# Patient Record
Sex: FEMALE | Race: WHITE | NOT HISPANIC OR LATINO | Employment: OTHER
[De-identification: names, ages, dates, MRNs, and addresses within clinical notes are randomized per-mention and may not be internally consistent; named-entity substitution may affect disease eponyms.]

---

## 2017-02-07 ENCOUNTER — RECORDS - HEALTHEAST (OUTPATIENT)
Dept: ADMINISTRATIVE | Facility: OTHER | Age: 72
End: 2017-02-07

## 2017-02-09 ENCOUNTER — RECORDS - HEALTHEAST (OUTPATIENT)
Dept: ADMINISTRATIVE | Facility: OTHER | Age: 72
End: 2017-02-09

## 2017-05-12 ENCOUNTER — OFFICE VISIT - HEALTHEAST (OUTPATIENT)
Dept: FAMILY MEDICINE | Facility: CLINIC | Age: 72
End: 2017-05-12

## 2017-05-12 ENCOUNTER — COMMUNICATION - HEALTHEAST (OUTPATIENT)
Dept: TELEHEALTH | Facility: CLINIC | Age: 72
End: 2017-05-12

## 2017-05-12 DIAGNOSIS — M54.50 LOW BACK PAIN: ICD-10-CM

## 2017-05-12 DIAGNOSIS — L98.9 BENIGN SKIN LESION: ICD-10-CM

## 2017-05-12 RX ORDER — IBUPROFEN 200 MG
400 TABLET ORAL EVERY 6 HOURS PRN
Status: SHIPPED | COMMUNITY
Start: 2017-05-12

## 2017-05-12 ASSESSMENT — MIFFLIN-ST. JEOR: SCORE: 973.85

## 2017-05-16 ENCOUNTER — RECORDS - HEALTHEAST (OUTPATIENT)
Dept: ADMINISTRATIVE | Facility: OTHER | Age: 72
End: 2017-05-16

## 2017-11-16 ENCOUNTER — COMMUNICATION - HEALTHEAST (OUTPATIENT)
Dept: FAMILY MEDICINE | Facility: CLINIC | Age: 72
End: 2017-11-16

## 2017-11-16 ENCOUNTER — RECORDS - HEALTHEAST (OUTPATIENT)
Dept: GENERAL RADIOLOGY | Facility: CLINIC | Age: 72
End: 2017-11-16

## 2017-11-16 ENCOUNTER — OFFICE VISIT - HEALTHEAST (OUTPATIENT)
Dept: FAMILY MEDICINE | Facility: CLINIC | Age: 72
End: 2017-11-16

## 2017-11-16 DIAGNOSIS — R05.9 COUGH: ICD-10-CM

## 2017-11-16 DIAGNOSIS — R52 BODY ACHES: ICD-10-CM

## 2017-11-16 DIAGNOSIS — R52 PAIN, UNSPECIFIED: ICD-10-CM

## 2020-08-06 ENCOUNTER — COMMUNICATION - HEALTHEAST (OUTPATIENT)
Dept: INTERNAL MEDICINE | Facility: CLINIC | Age: 75
End: 2020-08-06

## 2020-08-28 ENCOUNTER — OFFICE VISIT - HEALTHEAST (OUTPATIENT)
Dept: INTERNAL MEDICINE | Facility: CLINIC | Age: 75
End: 2020-08-28

## 2020-08-28 DIAGNOSIS — Z00.00 ROUTINE GENERAL MEDICAL EXAMINATION AT A HEALTH CARE FACILITY: ICD-10-CM

## 2020-08-28 DIAGNOSIS — Z12.11 SCREEN FOR COLON CANCER: ICD-10-CM

## 2020-08-28 DIAGNOSIS — Z98.82 H/O BILATERAL BREAST IMPLANTS: ICD-10-CM

## 2020-08-28 DIAGNOSIS — G47.00 INSOMNIA, UNSPECIFIED TYPE: ICD-10-CM

## 2020-08-28 DIAGNOSIS — M81.0 OSTEOPOROSIS WITHOUT CURRENT PATHOLOGICAL FRACTURE, UNSPECIFIED OSTEOPOROSIS TYPE: ICD-10-CM

## 2020-08-28 DIAGNOSIS — H04.203 BILATERAL EPIPHORA: ICD-10-CM

## 2020-08-28 DIAGNOSIS — E83.50 UNSPECIFIED DISORDER OF CALCIUM METABOLISM: ICD-10-CM

## 2020-08-28 DIAGNOSIS — B00.1 RECURRENT COLD SORES: ICD-10-CM

## 2020-08-28 LAB
ALBUMIN SERPL-MCNC: 4.2 G/DL (ref 3.5–5)
ALP SERPL-CCNC: 62 U/L (ref 45–120)
ALT SERPL W P-5'-P-CCNC: 21 U/L (ref 0–45)
ANION GAP SERPL CALCULATED.3IONS-SCNC: 8 MMOL/L (ref 5–18)
AST SERPL W P-5'-P-CCNC: 26 U/L (ref 0–40)
BASOPHILS # BLD AUTO: 0.1 THOU/UL (ref 0–0.2)
BASOPHILS NFR BLD AUTO: 2 % (ref 0–2)
BILIRUB SERPL-MCNC: 0.6 MG/DL (ref 0–1)
BUN SERPL-MCNC: 18 MG/DL (ref 8–28)
CALCIUM SERPL-MCNC: 9.8 MG/DL (ref 8.5–10.5)
CHLORIDE BLD-SCNC: 98 MMOL/L (ref 98–107)
CO2 SERPL-SCNC: 27 MMOL/L (ref 22–31)
CREAT SERPL-MCNC: 0.73 MG/DL (ref 0.6–1.1)
EOSINOPHIL # BLD AUTO: 0.2 THOU/UL (ref 0–0.4)
EOSINOPHIL NFR BLD AUTO: 3 % (ref 0–6)
ERYTHROCYTE [DISTWIDTH] IN BLOOD BY AUTOMATED COUNT: 12.1 % (ref 11–14.5)
GFR SERPL CREATININE-BSD FRML MDRD: >60 ML/MIN/1.73M2
GLUCOSE BLD-MCNC: 79 MG/DL (ref 70–125)
HCT VFR BLD AUTO: 38.3 % (ref 35–47)
HGB BLD-MCNC: 12.7 G/DL (ref 12–16)
IMM GRANULOCYTES # BLD: 0 THOU/UL
IMM GRANULOCYTES NFR BLD: 0 %
LYMPHOCYTES # BLD AUTO: 1.6 THOU/UL (ref 0.8–4.4)
LYMPHOCYTES NFR BLD AUTO: 34 % (ref 20–40)
MCH RBC QN AUTO: 31.4 PG (ref 27–34)
MCHC RBC AUTO-ENTMCNC: 33.2 G/DL (ref 32–36)
MCV RBC AUTO: 95 FL (ref 80–100)
MONOCYTES # BLD AUTO: 0.4 THOU/UL (ref 0–0.9)
MONOCYTES NFR BLD AUTO: 9 % (ref 2–10)
NEUTROPHILS # BLD AUTO: 2.4 THOU/UL (ref 2–7.7)
NEUTROPHILS NFR BLD AUTO: 51 % (ref 50–70)
PLATELET # BLD AUTO: 197 THOU/UL (ref 140–440)
PMV BLD AUTO: 12.4 FL (ref 8.5–12.5)
POTASSIUM BLD-SCNC: 4.8 MMOL/L (ref 3.5–5)
PROT SERPL-MCNC: 7 G/DL (ref 6–8)
RBC # BLD AUTO: 4.04 MILL/UL (ref 3.8–5.4)
SODIUM SERPL-SCNC: 133 MMOL/L (ref 136–145)
TSH SERPL DL<=0.005 MIU/L-ACNC: 2.15 UIU/ML (ref 0.3–5)
WBC: 4.6 THOU/UL (ref 4–11)

## 2020-08-31 ENCOUNTER — COMMUNICATION - HEALTHEAST (OUTPATIENT)
Dept: INTERNAL MEDICINE | Facility: CLINIC | Age: 75
End: 2020-08-31

## 2020-09-28 ENCOUNTER — RECORDS - HEALTHEAST (OUTPATIENT)
Dept: ADMINISTRATIVE | Facility: OTHER | Age: 75
End: 2020-09-28

## 2020-11-06 ENCOUNTER — RECORDS - HEALTHEAST (OUTPATIENT)
Dept: ADMINISTRATIVE | Facility: OTHER | Age: 75
End: 2020-11-06

## 2020-11-06 LAB — COLOGUARD-ABSTRACT: NEGATIVE

## 2020-11-12 ENCOUNTER — AMBULATORY - HEALTHEAST (OUTPATIENT)
Dept: INTERNAL MEDICINE | Facility: CLINIC | Age: 75
End: 2020-11-12

## 2020-11-12 ENCOUNTER — COMMUNICATION - HEALTHEAST (OUTPATIENT)
Dept: ADMINISTRATIVE | Facility: CLINIC | Age: 75
End: 2020-11-12

## 2020-11-12 DIAGNOSIS — R19.5 POSITIVE COLORECTAL CANCER SCREENING USING COLOGUARD TEST: ICD-10-CM

## 2020-11-16 ENCOUNTER — RECORDS - HEALTHEAST (OUTPATIENT)
Dept: HEALTH INFORMATION MANAGEMENT | Facility: CLINIC | Age: 75
End: 2020-11-16

## 2020-11-23 ENCOUNTER — RECORDS - HEALTHEAST (OUTPATIENT)
Dept: ADMINISTRATIVE | Facility: OTHER | Age: 75
End: 2020-11-23

## 2020-12-17 ENCOUNTER — OFFICE VISIT - HEALTHEAST (OUTPATIENT)
Dept: INTERNAL MEDICINE | Facility: CLINIC | Age: 75
End: 2020-12-17

## 2020-12-17 DIAGNOSIS — L57.0 ACTINIC KERATOSIS: ICD-10-CM

## 2020-12-17 DIAGNOSIS — B00.1 RECURRENT COLD SORES: ICD-10-CM

## 2020-12-17 DIAGNOSIS — H04.203 BILATERAL EPIPHORA: ICD-10-CM

## 2020-12-17 DIAGNOSIS — G47.00 INSOMNIA, UNSPECIFIED TYPE: ICD-10-CM

## 2020-12-17 DIAGNOSIS — Z98.82 H/O BILATERAL BREAST IMPLANTS: ICD-10-CM

## 2020-12-17 DIAGNOSIS — R63.6 UNDERWEIGHT: ICD-10-CM

## 2020-12-17 DIAGNOSIS — Z00.00 ROUTINE GENERAL MEDICAL EXAMINATION AT A HEALTH CARE FACILITY: ICD-10-CM

## 2020-12-17 DIAGNOSIS — M81.0 OSTEOPOROSIS WITHOUT CURRENT PATHOLOGICAL FRACTURE, UNSPECIFIED OSTEOPOROSIS TYPE: ICD-10-CM

## 2020-12-17 RX ORDER — SODIUM CHLORIDE 50 MG/G
OINTMENT OPHTHALMIC
Status: SHIPPED | COMMUNITY
Start: 2020-10-27

## 2020-12-17 RX ORDER — DOXYCYCLINE 50 MG/1
50 CAPSULE ORAL DAILY
Qty: 90 CAPSULE | Refills: 3 | Status: SHIPPED | OUTPATIENT
Start: 2020-12-17 | End: 2021-12-23

## 2020-12-17 ASSESSMENT — MIFFLIN-ST. JEOR: SCORE: 980.43

## 2020-12-18 ENCOUNTER — COMMUNICATION - HEALTHEAST (OUTPATIENT)
Dept: INTERNAL MEDICINE | Facility: CLINIC | Age: 75
End: 2020-12-18

## 2021-02-28 ENCOUNTER — COMMUNICATION - HEALTHEAST (OUTPATIENT)
Dept: INTERNAL MEDICINE | Facility: CLINIC | Age: 76
End: 2021-02-28

## 2021-02-28 DIAGNOSIS — B00.1 RECURRENT COLD SORES: ICD-10-CM

## 2021-02-28 DIAGNOSIS — G47.00 INSOMNIA, UNSPECIFIED TYPE: ICD-10-CM

## 2021-03-01 RX ORDER — VALACYCLOVIR HYDROCHLORIDE 500 MG/1
500 TABLET, FILM COATED ORAL 2 TIMES DAILY PRN
Qty: 20 TABLET | Refills: 1 | Status: SHIPPED | OUTPATIENT
Start: 2021-03-01 | End: 2022-03-25

## 2021-03-01 RX ORDER — ZOLPIDEM TARTRATE 10 MG/1
5 TABLET ORAL
Qty: 90 TABLET | Refills: 0 | Status: SHIPPED | OUTPATIENT
Start: 2021-03-01 | End: 2021-09-27

## 2021-05-26 ENCOUNTER — OFFICE VISIT - HEALTHEAST (OUTPATIENT)
Dept: INTERNAL MEDICINE | Facility: CLINIC | Age: 76
End: 2021-05-26

## 2021-05-26 DIAGNOSIS — Z86.0100 PERSONAL HISTORY OF COLONIC POLYPS: ICD-10-CM

## 2021-05-26 DIAGNOSIS — E87.1 HYPONATREMIA: ICD-10-CM

## 2021-05-26 DIAGNOSIS — M81.0 OSTEOPOROSIS WITHOUT CURRENT PATHOLOGICAL FRACTURE, UNSPECIFIED OSTEOPOROSIS TYPE: ICD-10-CM

## 2021-05-26 DIAGNOSIS — R63.6 UNDERWEIGHT: ICD-10-CM

## 2021-05-26 LAB
ANION GAP SERPL CALCULATED.3IONS-SCNC: 11 MMOL/L (ref 5–18)
BUN SERPL-MCNC: 18 MG/DL (ref 8–28)
CALCIUM SERPL-MCNC: 9.7 MG/DL (ref 8.5–10.5)
CHLORIDE BLD-SCNC: 101 MMOL/L (ref 98–107)
CO2 SERPL-SCNC: 27 MMOL/L (ref 22–31)
CREAT SERPL-MCNC: 0.72 MG/DL (ref 0.6–1.1)
GFR SERPL CREATININE-BSD FRML MDRD: >60 ML/MIN/1.73M2
GLUCOSE BLD-MCNC: 84 MG/DL (ref 70–125)
POTASSIUM BLD-SCNC: 4.6 MMOL/L (ref 3.5–5)
SODIUM SERPL-SCNC: 139 MMOL/L (ref 136–145)
TSH SERPL DL<=0.005 MIU/L-ACNC: 1.88 UIU/ML (ref 0.3–5)

## 2021-05-26 RX ORDER — ONDANSETRON 4 MG/1
TABLET, FILM COATED ORAL
Status: SHIPPED | COMMUNITY
Start: 2021-05-06 | End: 2021-12-14

## 2021-05-26 RX ORDER — AMLODIPINE BESYLATE 2.5 MG/1
TABLET ORAL
Status: SHIPPED | COMMUNITY
Start: 2021-05-10 | End: 2022-04-27

## 2021-05-26 ASSESSMENT — MIFFLIN-ST. JEOR: SCORE: 951.74

## 2021-05-27 ENCOUNTER — COMMUNICATION - HEALTHEAST (OUTPATIENT)
Dept: INTERNAL MEDICINE | Facility: CLINIC | Age: 76
End: 2021-05-27

## 2021-05-31 VITALS — HEIGHT: 64 IN | WEIGHT: 108 LBS | BODY MASS INDEX: 18.44 KG/M2

## 2021-05-31 VITALS — BODY MASS INDEX: 18.48 KG/M2 | WEIGHT: 108.5 LBS

## 2021-06-03 ENCOUNTER — RECORDS - HEALTHEAST (OUTPATIENT)
Dept: ADMINISTRATIVE | Facility: OTHER | Age: 76
End: 2021-06-03

## 2021-06-03 ENCOUNTER — RECORDS - HEALTHEAST (OUTPATIENT)
Dept: BONE DENSITY | Facility: CLINIC | Age: 76
End: 2021-06-03

## 2021-06-03 ENCOUNTER — COMMUNICATION - HEALTHEAST (OUTPATIENT)
Dept: INTERNAL MEDICINE | Facility: CLINIC | Age: 76
End: 2021-06-03

## 2021-06-03 DIAGNOSIS — M81.0 AGE-RELATED OSTEOPOROSIS WITHOUT CURRENT PATHOLOGICAL FRACTURE: ICD-10-CM

## 2021-06-03 DIAGNOSIS — M81.0 OSTEOPOROSIS WITHOUT CURRENT PATHOLOGICAL FRACTURE, UNSPECIFIED OSTEOPOROSIS TYPE: ICD-10-CM

## 2021-06-04 VITALS
BODY MASS INDEX: 19.18 KG/M2 | OXYGEN SATURATION: 97 % | HEART RATE: 71 BPM | TEMPERATURE: 98.3 F | WEIGHT: 112.6 LBS | SYSTOLIC BLOOD PRESSURE: 130 MMHG | DIASTOLIC BLOOD PRESSURE: 86 MMHG

## 2021-06-05 VITALS
DIASTOLIC BLOOD PRESSURE: 90 MMHG | HEIGHT: 65 IN | BODY MASS INDEX: 17.94 KG/M2 | SYSTOLIC BLOOD PRESSURE: 158 MMHG | TEMPERATURE: 98 F | HEART RATE: 71 BPM | WEIGHT: 107.7 LBS | OXYGEN SATURATION: 99 %

## 2021-06-10 NOTE — PATIENT INSTRUCTIONS - HE
Establishing primary care for this 75-year-old woman who was getting most of her medical care from [facility] in [place] but is good to be transitioning to the [facility].  She is historically been very healthy.  Issues are osteoporosis for which she has been on alendronate for approximately 4 years, stopping in 2019 (planned to start bisphosphonate drug holiday), let us have her repeat bone density scanning in May 2021.  Bilateral epiphora, takes doxycycline low-dose 50 mg once a day, monitored at [place] [facility], status post bilateral intraocular implants.  Mild alopecia and spider veins.  Insomnia for which he uses every day Ambien, tolerating well.  Recurrent herpes simplex labialis (cold sores) takes Valtrex 5 mg tablet intermittently.  Last colonoscopy done in 2010 revealing only diverticulosis, could consider getting Cologuard stool stool testing every 3 years.  Slightly underweight, body mass index around 18.  Immunizations up-to-date including shingles, and tetanus, but I think she should get a pneumococcal 23 polysaccharide vaccine today, and also needs to get her annual flu vaccine soon as that is available in the next week or 2.    Today we will get some baseline blood tests, nonfasting, will get comprehensive metabolic panel, CBC, thyroid cascade.  Cologuard test ordered.    Going issue issue:    Osteoporosis for which she has been on alendronate for approximately 4 years, stopping in 2019 (planned to start bisphosphonate drug holiday), let us have her repeat bone density scanning in May 2021.  [facility] endocrinology had recommended her to continue alendronate through 2020 to complete 5 years.  However she decided to stop it in 2019.  That means she had 4 years of alendronate.  A drug holiday typically last for 5 years, then alendronate could be restarted.  Last DEXA scan was May 2019 done at [facility] with a femoral neck T score of -2.5, lumbar spine T score -2.2.    I reminded her about  the importance of staying on her calcium and vitamin D supplements.  List plan to recheck DEXA bone density scan in May 2021, which will be a 2-year interval from her last test at [facility].  Her posture is good.  She is no history of fractures.  She stays physically active.  Her gait is stable.    Bilateral epiphora, takes doxycycline low-dose 50 mg once a day, monitored at [place] [facility], status post bilateral intraocular implants.      Mild alopecia and spider veins.     Insomnia for which he uses every day Ambien, tolerating well.  She told me that alcohol consumption is 2 glasses of wine per day.  I told her that a little bit on the high side, since CDC recommendations say that women should limit alcohol to 1 serving per day, which would be 1 bottle beer, 1 glass of wine, or 1 ounce of liquor.    Recurrent herpes simplex labialis (cold sores) takes Valtrex 5 mg tablet intermittently.      Last colonoscopy done in 2010 revealing only diverticulosis, could consider getting Cologuard stool stool testing every 3 years.      Slightly underweight, body mass index around 18.     Immunizations up-to-date including shingles, and tetanus, but I think she should get a pneumococcal 23 polysaccharide vaccine today, and also needs to get her annual flu vaccine soon as that is available in the next week or 2.    Immunization History   Administered Date(s) Administered     Hep A, historic 05/12/2011     Influenza [id number], 01/06/2010     Influenza, inj, historic,unspecified 09/24/2010, 09/21/2011, 10/01/2012, 10/29/2013, 09/21/2014, 09/13/2015, 09/06/2016, 10/01/2016, 09/29/2017     Pneumo Conj 13-V (2010&after) 11/01/2016     Td,adult,historic,unspecified 04/12/2000, 05/12/2011     ZOSTER, LIVE 05/18/2009     ZOSTER, RECOMBINANT, IM 05/13/2019     I like to see her back in 6 months.

## 2021-06-10 NOTE — TELEPHONE ENCOUNTER
I really cannot order if she is not my patient.  She can go through her PCP or she can contact [facility].

## 2021-06-10 NOTE — TELEPHONE ENCOUNTER
Spoke with the patient and relayed message below from Dr. Adams.  She verbalized understanding and had no further questions at this time.  Veronique JHA, JENNIFER/CMT....................12:10 PM

## 2021-06-10 NOTE — PROGRESS NOTES
Office Visit - New Patient   Alma Michele   75 y.o.  female    Date of visit: 8/28/2020  Physician: Andrea Cruz MD     Chief Complaint   Chief Complaint   Patient presents with     Blood Pressure Check        Assessment and Plan  This 75 y.o. old woman    Establishing primary care for this 75-year-old woman who was getting most of her medical care from [facility] in [place] but is good to be transitioning to the [facility].  She is historically been very healthy.  Issues are osteoporosis for which she has been on alendronate for approximately 4 years, stopping in 2019 (planned to start bisphosphonate drug holiday), let us have her repeat bone density scanning in May 2021.  Bilateral epiphora, takes doxycycline low-dose 50 mg once a day, monitored at [place] [facility], status post bilateral intraocular implants.  Mild alopecia and spider veins.  Insomnia for which he uses every day Ambien, tolerating well.  Recurrent herpes simplex labialis (cold sores) takes Valtrex 5 mg tablet intermittently.  Last colonoscopy done in 2010 revealing only diverticulosis, could consider getting Cologuard stool stool testing every 3 years.  Slightly underweight, body mass index around 18.  Immunizations up-to-date including shingles, and tetanus, but I think she should get a pneumococcal 23 polysaccharide vaccine today, and also needs to get her annual flu vaccine soon as that is available in the next week or 2.    Today we will get some baseline blood tests, nonfasting, will get comprehensive metabolic panel, CBC, thyroid cascade.  Cologuard test ordered.    Going issue issue:    Osteoporosis for which she has been on alendronate for approximately 4 years, stopping in 2019 (planned to start bisphosphonate drug holiday), let us have her repeat bone density scanning in May 2021.  [facility] endocrinology had recommended her to continue alendronate through 2020 to complete 5 years.  However she decided to stop it in 2019.   That means she had 4 years of alendronate.  A drug holiday typically last for 5 years, then alendronate could be restarted.  Last DEXA scan was May 2019 done at [facility] with a femoral neck T score of -2.5, lumbar spine T score -2.2.    I reminded her about the importance of staying on her calcium and vitamin D supplements.  List plan to recheck DEXA bone density scan in May 2021, which will be a 2-year interval from her last test at [facility].  Her posture is good.  She is no history of fractures.  She stays physically active.  Her gait is stable.    Bilateral epiphora, takes doxycycline low-dose 50 mg once a day, monitored at [place] [facility], status post bilateral intraocular implants.      Mild alopecia and spider veins.     Insomnia for which he uses every day Ambien, tolerating well.  She told me that alcohol consumption is 2 glasses of wine per day.  I told her that a little bit on the high side, since CDC recommendations say that women should limit alcohol to 1 serving per day, which would be 1 bottle beer, 1 glass of wine, or 1 ounce of liquor.    Recurrent herpes simplex labialis (cold sores) takes Valtrex 5 mg tablet intermittently.      Last colonoscopy done in 2010 revealing only diverticulosis, could consider getting Cologuard stool stool testing every 3 years.      Slightly underweight, body mass index around 18.     Immunizations up-to-date including shingles, and tetanus, but I think she should get a pneumococcal 23 polysaccharide vaccine today, and also needs to get her annual flu vaccine soon as that is available in the next week or 2.    Immunization History   Administered Date(s) Administered     Hep A, historic 05/12/2011     Influenza [id number], 01/06/2010     Influenza, inj, historic,unspecified 09/24/2010, 09/21/2011, 10/01/2012, 10/29/2013, 09/21/2014, 09/13/2015, 09/06/2016, 10/01/2016, 09/29/2017     Pneumo Conj 13-V (2010&after) 11/01/2016     Td,adult,historic,unspecified  04/12/2000, 05/12/2011     ZOSTER, LIVE 05/18/2009     ZOSTER, RECOMBINANT, IM 05/13/2019     I like to see her back in 6 months.    --------------------------------------------------------------------------------------------------------------------------  History of Present Illness  This 75 y.o. old woman  Establishing primary care for this 75-year-old woman who was getting most of her medical care from [facility] in [place] but is good to be transitioning to the [facility].  She is historically been very healthy.  Issues are osteoporosis for which she has been on alendronate for approximately 4 years, stopping in 2019 (planned to start bisphosphonate drug holiday), let us have her repeat bone density scanning in May 2021.  Bilateral epiphora, takes doxycycline low-dose 50 mg once a day, monitored at [place] [facility], status post bilateral intraocular implants.  Mild alopecia and spider veins.  Insomnia for which he uses every day Ambien, tolerating well.  Recurrent herpes simplex labialis (cold sores) takes Valtrex 5 mg tablet intermittently.  Last colonoscopy done in 2010 revealing only diverticulosis, could consider getting Cologuard stool stool testing every 3 years.  Slightly underweight, body mass index around 18.  Immunizations up-to-date including shingles, and tetanus, but I think she should get a pneumococcal 23 polysaccharide vaccine today, and also needs to get her annual flu vaccine soon as that is available in the next week or 2.    [place] 5-22-19  [place] ophtho  Epiphora Bilateral  Mild, chronic condition  Lacrimal drainage system seems to be intact.  Patient is not regularly using artificial tears    Doxycyline 50 mg a day  [facility]   Intraocular Lens Implant Status Post  Both eyes, stable  Plan: Monitor periodically.    evaluation of osteoporosis. The patient diagnosed with osteoporosis in 2005 based upon bone density.    May 2019  Bone mineral density testing  Left Hip [single  scan]:  Femur Neck: BMD = 0.687 g/cm (sq)  T-score = -2.5 Z-score = -0.7  Total Hip: BMD = 0.662 g/cm (sq)  T-score = -2.7 Z-score = -1.1  Right Hip [single scan]:  Femur Neck: BMD = 0.661 g/cm (sq)  T-score = -2.7 Z-score = -0.9  Total Hip: BMD = 0.658 g/cm (sq)  T-score = -2.8 Z-score = -1.2  Lumbar Spine [single scan]:  L1: BMD = 0.867 g/cm (sq), T-score =-2.2, Z-score =-0.5  L2: BMD = 0.949 g/cm (sq), T-score =-2.2, Z-score =-0.4  Total Lumbar Spine: BMD = 0.910 g/cm (sq)  T-score = -2.2 Z-score = -0.5  Trabecular Bone Scores:  L1-L2: TBS = 1.170    Skeletal imaging  Spine x-ray 2 years ago no fractures    Postmenopausal osteoporosis  Doing well. She desires drug holidays to manage questions regarding potential for over treatment. Although hip T-score is low, lacks other risk factors and this is primary prevention thus agree with holiday approach.   Completing 5 years of alendronate therapy (through 2020) then discontinuing for 5 years unless she were to have a low trauma fracture.     Likely to restart bisphosphonate therapy in 2025 and less alternative agent chosen especially if she has a low trauma fracture.    Osteoporosis  Patient has known osteoporosis, and has been on Fosamax for the past 5 years.   Started holiday 2019  .    Alopecia  She noted that there is a linear pattern of hair loss on the right side of her scalp.    Spider Vein  She has bilateral lower extremity spider veins, had previous sclerotherapy    Insomnia  She has been using Ambien at a low dose of 5 mg and night tolerating without side effects.    Porokeratosis Disseminated Superficial Actinic  She the past was treated with topical Tay joint 0.05% cream to be applied to affected areas nightly and she will be following up with Dermatology    Herpes Simplex Labialis  She will have 0-3 flares in a year of her oral herpes. She has Valtrex, 500 mg tablets available orally if she has an outbreak.    [facility]  A. Smoking  history. Sporadic in college, intermittently for three years. She was not definitive, but it sounds like it might have been a pack a week or a pack a month during that time period. Alcohol: She drinks between 1 and 1/2 glasses of wine per day and has reduced overall intake.  B. Mammogram pending. No family history of breast cancer. Repeat in one year.  C. Colonoscopy done 2010 revealing only diverticulosis. No family history of colon cancer. Repeat colon screening which could include Cologuard stool testing in 2020.  D. Body mass index normal at 18.3, unchanged.  E. Exercises: Encouraged to incorporate a walking program into her daily activities.  F. Falls: None in the past year.   G. Glucose: 91, normal   H. Blood pressure 129/76 .   I. Immunizations: Tdap given 2011. Repeat in 2021. Prevnar 13 -she states she get locally in 11/01/2016. Zoster given 2009. Influenza given yearly, shingles vaccination given baseline and booster given May 2019.   J. Advance directives: The patient has one, her  Phong Michele is power of  for healthcare and her daughter, Sara Shoemaker, is alternative. Patient brought a copy of her advance directive, and a copy will be scanned into the computer.  K. Patient has both smoke detectors and carbon monoxide detectors in her home.  L. Lipids; Total cholesterol 197, HDL 84, , triglycerides 44.   M. Skin: No personal history of skin cancer. She uses SPF 30 or above, and she has remote history of tanning bed use, not current.  N. Drives: Seatbelts 100% of the time. No motor vehicle accidents in the last year.   O. Repeat bone density pending. Vitamin D levels in 2015 were 74, on replacement. Known osteoporosis. Patient had restarted Fosamax in 2015 after a drug holiday between 2011 and 2015.  P. Pap smear not recommended over age 65.   Q. Hepatitis C screen: Done in 2014, negative.     Immunization History   Administered Date(s) Administered     Hep A, historic 05/12/2011      Influenza [id number], 01/06/2010     Influenza, inj, historic,unspecified 09/24/2010, 09/21/2011, 10/01/2012, 10/29/2013, 09/21/2014, 09/13/2015, 09/06/2016, 10/01/2016, 09/29/2017     Pneumo Conj 13-V (2010&after) 11/01/2016     Td,adult,historic,unspecified 04/12/2000, 05/12/2011     ZOSTER, LIVE 05/18/2009     ZOSTER, RECOMBINANT, IM 05/13/2019       Wt Readings from Last 3 Encounters:   08/28/20 112 lb 9.6 oz (51.1 kg)   11/16/17 108 lb 8 oz (49.2 kg)   05/12/17 108 lb (49 kg)     BP Readings from Last 3 Encounters:   08/28/20 130/86   11/16/17 120/80   05/12/17 116/86     Review of Systems: A comprehensive review of systems was negative except as noted.  ---------------------------------------------------------------------------------------------------------------------------    Medications and Allergies   Current Outpatient Medications   Medication Sig Dispense Refill     aspirin 81 MG EC tablet Take 81 mg by mouth daily.       calcium carbonate (OS-TIMOTHY) 600 mg calcium (1,500 mg) tablet Take 600 mg by mouth 2 (two) times a day with meals.       cholecalciferol, vitamin D3, (VITAMIN D3) 2,000 unit cap Take 1 capsule by mouth daily.       doxycycline (MONODOX) 50 MG capsule Take 50 mg by mouth daily.       ibuprofen (ADVIL,MOTRIN) 200 MG tablet Take 400 mg by mouth every 6 (six) hours as needed for pain.       valACYclovir (VALTREX) 500 MG tablet Take 500 mg by mouth 2 (two) times a day as needed.       zolpidem (AMBIEN) 10 mg tablet Take 5 mg by mouth at bedtime as needed for sleep.       amoxicillin (AMOXIL) 500 MG capsule Take 500 mg by mouth once. Before procedure       No current facility-administered medications for this visit.      No Known Allergies     Active Ambulatory Problems     Diagnosis Date Noted     Recurrent cold sores 05/12/2017     Osteoporosis 05/12/2017     Actinic keratosis 05/12/2017     H/O bilateral breast implants 05/12/2017     Bilateral epiphora 08/28/2020     Insomnia 08/28/2020      Resolved Ambulatory Problems     Diagnosis Date Noted     No Resolved Ambulatory Problems     No Additional Past Medical History     No past surgical history on file.   No past medical history on file.     Family and Social History   Family History   Problem Relation Age of Onset     Heart failure Father      Colon polyps Sister      No Medical Problems Brother      No Medical Problems Maternal Grandmother      Stroke Maternal Grandfather         Social History     Tobacco Use     Smoking status: Former Smoker     Years: 4.00     Smokeless tobacco: Never Used   Substance Use Topics     Alcohol use: Not on file     Drug use: No        Physical Exam   General Appearance: Very pleasant, normal mental status, appears well, but thin, cognitively seems quite intact, rises easily from seated to standing position, gait steady    /86 (Patient Site: Right Arm, Patient Position: Sitting, Cuff Size: Adult Large)   Pulse 71   Temp 98.3  F (36.8  C) (Oral)   Wt 112 lb 9.6 oz (51.1 kg)   SpO2 97%   BMI 19.18 kg/m      General: Alert, in no distress  Skin: No significant lesion seen.  Eyes/nose/throat: Eyes without scleral icterus, eye movements normal, pupils equal and reactive, oropharynx clear, ears with normal TM's  MSK: Neck with good ROM  Lymphatic: Neck without adenopathy or masses  Endocrine: Thyroid with no nodules to palpation  Pulm: Lungs clear to auscultation bilaterally  Cardiac: Heart with regular rate and rhythm, no murmur or gallop  GI: Abdomen soft, nontender. No palpable enlargement of liver or spleen  MSK: Extremities no tenderness or edema  Neuro: Moves all extremities, without focal weakness  Psych: Alert, normal mental status. Normal affect and speech       Additional Information        Andrea Cruz MD  Internal Medicine

## 2021-06-10 NOTE — PROGRESS NOTES
Assessment and Plan    1. Low back pain  Improving on its own.  Discussed importance of core training - yoga, pilates for instance - for preventing low back pain    2. Benign skin lesion - no red flag with respect to size, border, color, symmetry  Looks like a seborrheic keratosis to me - reassured    Per her request for eyelash growth encouragement:    Medications Ordered   Medications     bimatoprost (LATISSE) 0.03 % ophthalmic solution     Sig: Administer 1 application to both eyes at bedtime. 1 drop  apply evenly along the skin of each upper eyelid at base of eyelashes qhs     Dispense:  3 mL     Refill:  12       Over 30 minutes spent with this patient, over half in counseling - reviewing history , current issues, red flags for back pain, prevention    Patient Instructions       Three cancers to watch for  1) melanoma - DARK, irregular  2) squamous cell cancer - rough reddish or pale - no distinct border  3)basal cell - a little staci with a depression in the middle    The imposter  Seborrheic keratoses - also called stucco keratosis - looks stuck on - tends to be brownish and a little greasy - they can ge really large    Asymetry  Border (irregular)  Color - different mixes of color  Diameter - greater than 5 m  .    Preeti Gilbert MD     -------------------------------------------    Chief Complaint   Patient presents with     Establish Care     Other     spot on left leg since winter     Back Pain     had it before Easter, must have been from lifting grandkids, tight feeling in lower back, sometimes harder to sit and getting up.     Darell     wants Darell Marquez is generally healthy.  Her  - who is a member of [employer] - gets and executive physical at [facility] every year, and years ago Alma had decided to go there for physicals as well.  However her doctor there said she ought to establish with a primary care physician here, in she gets sick or something happens here. I did review  with her that as PCPs, we also do preventive care, but I understand she has been seeing the same physician for years and wants to stick with her.    Ongoing issues:   - osteoporosis - she takes alendronate   - actinic keratoses - she sees a dermatologist and has had these frozen off   - recurrent cold sores - takes Valtrex prn    Regarding preventive care:   - colonoscopy done   - Has a living will at home - will bring a copy   - Has had pneumonia vaccine      Other concerns    She has had a spot on her legs since winter and wonders if she should worry    She also would like an prescription for Latisse for hypotrichosis of eyelashes    Started having back pain around [place] - doing a lot of work getting ready for family visit at [place] at [place] time .  Not sure if she did something.  Her grandchildren came to visit and she does pick them up and she assumes she pulled something. It has gotten a little better the last week - .  Walking has always been fine.  Leaning forward or getting up makes it worse.      She has no tingling or weakness.  She does not  do any regular exercise, but she is active around the house and the yard    Patient Active Problem List   Diagnosis     Recurrent cold sores     Osteoporosis     Actinic keratosis     H/O bilateral breast implants         Current Outpatient Prescriptions:      alendronate (FOSAMAX) 70 MG tablet, Take 70 mg by mouth every 7 days. Take in the morning on an empty stomach with a full glass of water 30 minutes before food, Disp: , Rfl:      amoxicillin (AMOXIL) 500 MG capsule, Take 500 mg by mouth once. Before procedure, Disp: , Rfl:      aspirin 81 MG EC tablet, Take 81 mg by mouth daily., Disp: , Rfl:      calcium carbonate (OS-TIMOTHY) 600 mg calcium (1,500 mg) tablet, Take 600 mg by mouth 2 (two) times a day with meals., Disp: , Rfl:      cholecalciferol, vitamin D3, (VITAMIN D3) 2,000 unit cap, Take 1 capsule by mouth daily., Disp: , Rfl:       ibuprofen (ADVIL,MOTRIN) 200 MG tablet, Take 400 mg by mouth every 6 (six) hours as needed for pain., Disp: , Rfl:      valACYclovir (VALTREX) 500 MG tablet, Take 500 mg by mouth 2 (two) times a day as needed., Disp: , Rfl:      zolpidem (AMBIEN) 10 mg tablet, Take 5 mg by mouth at bedtime as needed for sleep., Disp: , Rfl:      bimatoprost (LATISSE) 0.03 % ophthalmic solution, Administer 1 application to both eyes at bedtime. 1 drop  apply evenly along the skin of each upper eyelid at base of eyelashes qhs, Disp: 3 mL, Rfl: 12          History   Smoking Status     Current Every Day Smoker     Years: 4.00   Smokeless Tobacco     Not on file       History   Alcohol use Not on file       Review of Systems -  Feeling generally well    Vitals:    05/12/17 1344   BP: 116/86   Pulse: 67   SpO2: 98%     Body mass index is 18.39 kg/(m^2).     EXAM:    General appearance - alert, well appearing, and in no distress  Skin - 5mm oval lesion with irregular surface on left shin - most consistent with seborrheic keratosis

## 2021-06-13 NOTE — TELEPHONE ENCOUNTER
Please call her to tell her that the Cologuard test came back positive, and therefore I have ordered for her a diagnostic colonoscopy.    Remind her that the Cologuard positive test does not necessarily mean that there is a cancer present.  It only means that there was some abnormal DNA present, and then she needs to get a colonoscopy to further evaluate.  It is likely that polyps are present.

## 2021-06-13 NOTE — PROGRESS NOTES
Assessment and Plan:     Annual wellness visit, she is generally been feeling well since our previous visit of August 28, 2020.  Unfortunately her  got seriously ill at the beginning of October, initially urosepsis, then he went into the ICU with respiratory failure and cardiac problems, and he continues to be hospitalized at the [facility], had a cardiorespiratory arrest when he was about to be transferred to a TCU, now he has a trach, with intermittent ventilatory support.  This is all been extremely stressful for Ms. Michele, especially because she cannot see him in person, only on video.    We do not need any laboratory testing today, since we did a sent August 28, 2020 which included a satisfactory thyroid cascade, blood cell counts, and comprehensive metabolic panel (only abnormal value with sodium of 133 which I do not think is significant).    I like to see her back in about 6 months, and we can do some monitoring blood test at that time.  6 months from now would also be about the time to run her next DEXA scan.    I also encouraged her to put together her advanced directive and living will documents.  She told me these issues have been brought to the forefront during her 's illness.    Osteoporosis for which she has been on alendronate for approximately 4 years, stopping in 2019 (planned to start bisphosphonate drug holiday), let us have her repeat bone density scanning in May 2021.  [facility] endocrinology had recommended her to continue alendronate through 2020 to complete 5 years.  However she decided to stop it in 2019.  That means she had 4 years of alendronate.  A drug holiday typically last for 5 years, then alendronate could be restarted.  Last DEXA scan was May 2019 done at [facility] with a femoral neck T score of -2.5, lumbar spine T score -2.2.     I reminded her about the importance of staying on her calcium and vitamin D supplements.  List plan to recheck DEXA bone  density scan in May 2021, which will be a 2-year interval from her last test at [facility].  Her posture is good.  She is no history of fractures.  She stays physically active.  Her gait is stable.     Blood pressure is a bit elevated today at 158/90.  I asked her to keep track of her blood pressure with her home machine, and let me know if the systolic number at rest is repeatedly above 145.  I wonder some of the blood pressure elevation has been because of stress with her 's illness.    Bilateral epiphora, takes doxycycline low-dose 50 mg once a day, monitored at [place] [facility], status post bilateral intraocular implants.      Earwax on the left, I suggested to use an over-the-counter wax dissolving eardrop kit, example brand Debrox or Murine.  These are available in the ear care section of the pharmacy.    Mild alopecia and spider veins.     Insomnia for which he uses every day Ambien, but I really want her to cease alcohol consumption since she uses Ambien.  She told me that alcohol consumption is 2 glasses of wine per day.     Recurrent herpes simplex labialis (cold sores) takes Valtrex 5 mg tablet intermittently.      Colon polyp, 5 mm in the transverse seen on colonoscopy November 23, 2020.  Recheck in 5 years would be entirely optional.  She told me that the prep was really unpleasant, to the point that it made her vomit.  I told her that I think she could probably stop doing screening colonoscopy, especially since she also had a negative Cologuard on November 6, 2020    Slightly underweight, body mass index around 18.  She assured me that she is eating healthy food.  Yet she still getting a fair number of calories from the 2 glasses of wine per night.  I asked her to think about dialing back to wine, maybe even consider stopping, and eat more food.    Immunizations up-to-date including seasonal flu vaccine which she received on September 20, 2020.  Has received both pneumococcal vaccines, and  also got both recombinant shingles shots..    Immunization History   Administered Date(s) Administered     Hep A, historic 05/12/2011     Influenza [id number], 01/06/2010     Influenza high dose,seasonal,PF, 65+ yrs 09/28/2020     Influenza, inj, historic,unspecified 09/24/2010, 09/21/2011, 10/01/2012, 10/29/2013, 09/21/2014, 09/13/2015, 09/06/2016, 10/01/2016, 09/29/2017     Pneumo Conj 13-V (2010&after) 11/01/2016     Pneumo Polysac 23-V 08/28/2020     Td,adult,historic,unspecified 04/12/2000, 05/12/2011     ZOSTER, LIVE 05/18/2009     ZOSTER, RECOMBINANT, IM 05/13/2019       The patient's current medical problems were reviewed.    I have had an Advance Directives discussion with the patient.     The following health maintenance schedule was reviewed with the patient and provided in printed form in the after visit summary:   Health Maintenance   Topic Date Due     HEPATITIS C SCREENING  1945     DEXA SCAN  1945     LIPID  04/12/1991     ZOSTER VACCINES (3 of 3) 07/08/2019     TD 18+ HE  05/12/2021     MAMMOGRAM  05/22/2021     MEDICARE ANNUAL WELLNESS VISIT  12/17/2021     FALL RISK ASSESSMENT  12/17/2021     ADVANCE CARE PLANNING  12/17/2025     COLORECTAL CANCER SCREENING  11/23/2030     Pneumococcal Vaccine: 65+ Years  Completed     INFLUENZA VACCINE RULE BASED  Completed     Pneumococcal Vaccine: Pediatrics (0 to 5 Years) and At-Risk Patients (6 to 64 Years)  Aged Out     HEPATITIS B VACCINES  Aged Out        Subjective:   Chief Complaint: Alma Michele is an 75 y.o. female here for an Annual Wellness visit.   HPI:     Annual wellness visit, she is generally been feeling well since our previous visit of August 28, 2020.  Unfortunately her  got seriously ill at the beginning of October, initially urosepsis, then he went into the ICU with respiratory failure and cardiac problems, and he continues to be hospitalized at the [facility], had a cardiorespiratory arrest when he was about to be  transferred to a TCU, now he has a trach, with intermittent ventilatory support.  This is all been extremely stressful for Ms. Michele, especially because she cannot see him in person, only on video.    We do not need any laboratory testing today, since we did a sent August 28, 2020 which included a satisfactory thyroid cascade, blood cell counts, and comprehensive metabolic panel (only abnormal value with sodium of 133 which I do not think is significant).    I like to see her back in about 6 months, and we can do some monitoring blood test at that time.  6 months from now would also be about the time to run her next DEXA scan.    I also encouraged her to put together her advanced directive and living will documents.  She told me these issu      She is historically been very healthy.  Issues are osteoporosis for which she has been on alendronate for approximately 4 years, stopping in 2019 (planned to start bisphosphonate drug holiday), let us have her repeat bone density scanning in May 2021.  Bilateral epiphora, takes doxycycline low-dose 50 mg once a day, monitored at [place] [facility], status post bilateral intraocular implants.  Mild alopecia and spider veins.  Insomnia for which he uses every day Ambien, tolerating well.  Recurrent herpes simplex labialis (cold sores) takes Valtrex 5 mg tablet intermittently.  Last colonoscopy done in 2010 revealing only diverticulosis, could consider getting Cologuard stool stool testing every 3 years.  Slightly underweight, body mass index around 18.      Review of Systems:  Please see above.  The rest of the review of systems are negative for all systems.    Patient Care Team:  Andrea Cruz MD as PCP - General (Internal Medicine)  Andrea Cruz MD as Assigned PCP     Patient Active Problem List   Diagnosis     Recurrent cold sores     Osteoporosis     Actinic keratosis     H/O bilateral breast implants     Bilateral epiphora     Insomnia     No past medical  history on file.   No past surgical history on file.   Family History   Problem Relation Age of Onset     Heart failure Father      Colon polyps Sister      No Medical Problems Brother      No Medical Problems Maternal Grandmother      Stroke Maternal Grandfather       Social History     Socioeconomic History     Marital status:      Spouse name: Not on file     Number of children: Not on file     Years of education: Not on file     Highest education level: Not on file   Occupational History     Not on file   Social Needs     Financial resource strain: Not on file     Food insecurity     Worry: Not on file     Inability: Not on file     Transportation needs     Medical: Not on file     Non-medical: Not on file   Tobacco Use     Smoking status: Former Smoker     Years: 4.00     Smokeless tobacco: Never Used   Substance and Sexual Activity     Alcohol use: Not on file     Drug use: No     Sexual activity: Never     Partners: Male   Lifestyle     Physical activity     Days per week: Not on file     Minutes per session: Not on file     Stress: Not on file   Relationships     Social connections     Talks on phone: Not on file     Gets together: Not on file     Attends Protestant service: Not on file     Active member of club or organization: Not on file     Attends meetings of clubs or organizations: Not on file     Relationship status: Not on file     Intimate partner violence     Fear of current or ex partner: Not on file     Emotionally abused: Not on file     Physically abused: Not on file     Forced sexual activity: Not on file   Other Topics Concern     Not on file   Social History Narrative     Not on file      Current Outpatient Medications   Medication Sig Dispense Refill     aspirin 81 MG EC tablet Take 81 mg by mouth daily.       calcium carbonate (OS-TIMOTHY) 600 mg calcium (1,500 mg) tablet Take 600 mg by mouth 2 (two) times a day with meals.       cholecalciferol, vitamin D3, (VITAMIN D3) 2,000 unit cap  "Take 1 capsule by mouth daily.       doxycycline (MONODOX) 50 MG capsule Take 50 mg by mouth daily.       ibuprofen (ADVIL,MOTRIN) 200 MG tablet Take 400 mg by mouth every 6 (six) hours as needed for pain.       ELIZABETH 128 5 % ophthalmic ointment CORRIE 0.25 INCH TO BOTH EYES QPM       valACYclovir (VALTREX) 500 MG tablet Take 500 mg by mouth 2 (two) times a day as needed.       zolpidem (AMBIEN) 10 mg tablet Take 5 mg by mouth at bedtime as needed for sleep.       amoxicillin (AMOXIL) 500 MG capsule Take 500 mg by mouth once. Before procedure       No current facility-administered medications for this visit.       Objective:   Vital Signs:   Visit Vitals  /90 (Patient Site: Right Arm, Patient Position: Sitting, Cuff Size: Adult Regular)   Pulse 71   Temp 98  F (36.7  C) (Oral)   Ht 5' 4.75\" (1.645 m)   Wt 107 lb 11.2 oz (48.9 kg)   SpO2 99%   BMI 18.06 kg/m         VisionScreening:  No exam data present     PHYSICAL EXAM  General: Alert, in no distress  + Underweight  Skin: No significant lesion seen.  Eyes/nose/throat: Eyes without scleral icterus, eye movements normal, pupils equal and reactive, oropharynx clear, ears with normal TM's  MSK: Neck with good ROM  Normal spine curvature  Lymphatic: Neck without adenopathy or masses  Endocrine: Thyroid with no nodules to palpation  Pulm: Lungs clear to auscultation bilaterally  Cardiac: Heart with regular rate and rhythm, no murmur or gallop  GI: Abdomen soft, nontender. No palpable enlargement of liver or spleen  MSK: Extremities no tenderness or edema  Neuro: Moves all extremities, without focal weakness  Psych: Alert, normal mental status. Normal affect and speech       Assessment Results 12/17/2020   Activities of Daily Living No help needed   Instrumental Activities of Daily Living No help needed   Mini Cog Total Score 5   Some recent data might be hidden     A Mini-Cog score of 0-2 suggests the possibility of dementia, score of 3-5 suggests no " dementia    Identified Health Risks:     She is at risk for lack of exercise and has been provided with information to increase physical activity for the benefit of her well-being.  The patient reports that she drinks more than one alcoholic drink per day but denies binge or excessive drinking. She was counseled and given information about possible harmful effects of excessive alcohol intake.  Patient's advanced directive was discussed and I am comfortable with the patient's wishes.

## 2021-06-13 NOTE — TELEPHONE ENCOUNTER
Central PA team  [phone]  Pool: [facility] PA MED ([id number])          PA has been initiated.       PA form completed and faxed insurance via Cover My Meds     Key:  [id number] PA Case ID: [id number]     Medication:  Zolpidem Tartrate 10MG tablets    Insurance:  [employer]        Response will be received via fax and may take up to 5-10 business days depending on plan

## 2021-06-13 NOTE — TELEPHONE ENCOUNTER
PA needed for Zolpidem. Please start process.  Keturah Vasquez CMA ............... 1:12 PM, 12/18/20

## 2021-06-13 NOTE — TELEPHONE ENCOUNTER
Left voicemail for patient to return call to clinic. When patient returns call, please give them below message.    Keturah Vasquez CMA ............... 4:10 PM, 11/12/20

## 2021-06-13 NOTE — TELEPHONE ENCOUNTER
I contacted patient and advised of need for diagnostic colonoscopy based on cologuard results.  Advised she will receive a call to schedule.    Patient states she is not going to be able to do this procedure for awhile as  just had bypass surgery and currently on vent.    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  Lisa SINHA, CMA

## 2021-06-14 NOTE — PROGRESS NOTES
Assessment & Plan   1. Cough:  Cough and body aches for over a week.  Unlikely influenza given pattern of symptom development. Her chest xray is clear today, however clinically I do have concern for pneumonia with prominent fatigue and rales on exam.  Will treat with course of Augmentin.  Advised to start probiotic.  Discussed expected course of symptom resolution and advised returning if shortness of breath worsens or she develops fever.   - XR Chest PA and Lateral; Future  - amoxicillin-clavulanate (AUGMENTIN) 875-125 mg per tablet; Take 1 tablet by mouth 2 (two) times a day for 10 days.  Dispense: 20 tablet; Refill: 0    2. Body aches  - XR Chest PA and Lateral; Future    Trina Aiken CNP    Subjective   Chief Complaint:  Cough; Generalized Body Aches (x 9 days, no known fever); and Fatigue    HPI:   Alma Michele is a 72 y.o. female who presents for evaluation of cough and body aches.    PMH notable for osteoporosis, actinic keratosis, otherwise negative.      She states symptoms began a week and a half ago.  Initially URI symptoms and then cough and body aches developed.  Cough has worsened significantly and she feels short of breath at times.  Very little energy, not able to keep up with normal activities.  She has not noted fever or chills.  No prior history of respiratory issues.      PMH:   Patient Active Problem List   Diagnosis     Recurrent cold sores     Osteoporosis     Actinic keratosis     H/O bilateral breast implants       No past medical history on file.    Current Medications:   Current Outpatient Prescriptions on File Prior to Visit   Medication Sig Dispense Refill     alendronate (FOSAMAX) 70 MG tablet Take 70 mg by mouth every 7 days. Take in the morning on an empty stomach with a full glass of water 30 minutes before food       amoxicillin (AMOXIL) 500 MG capsule Take 500 mg by mouth once. Before procedure       aspirin 81 MG EC tablet Take 81 mg by mouth daily.       bimatoprost (LATISSE)  0.03 % ophthalmic solution Administer 1 application to both eyes at bedtime. 1 drop  apply evenly along the skin of each upper eyelid at base of eyelashes qhs 3 mL 12     calcium carbonate (OS-TIMOTHY) 600 mg calcium (1,500 mg) tablet Take 600 mg by mouth 2 (two) times a day with meals.       cholecalciferol, vitamin D3, (VITAMIN D3) 2,000 unit cap Take 1 capsule by mouth daily.       ibuprofen (ADVIL,MOTRIN) 200 MG tablet Take 400 mg by mouth every 6 (six) hours as needed for pain.       valACYclovir (VALTREX) 500 MG tablet Take 500 mg by mouth 2 (two) times a day as needed.       zolpidem (AMBIEN) 10 mg tablet Take 5 mg by mouth at bedtime as needed for sleep.       No current facility-administered medications on file prior to visit.        Allergies:  has No Known Allergies.    SH/FH:  Social History and Family History reviewed and updated.   Tobacco Status:  She  reports that she has quit smoking. She quit after 4.00 years of use. She has never used smokeless tobacco.    Review of Systems:  A complete head to toe ROS is negative unless otherwise noted in HPI    Objective     Vitals:    11/16/17 0922   BP: 120/80   Patient Site: Left Arm   Patient Position: Sitting   Cuff Size: Adult Regular   Pulse: 76   Temp: 98.3  F (36.8  C)   TempSrc: Oral   Weight: 108 lb 8 oz (49.2 kg)     Wt Readings from Last 3 Encounters:   11/16/17 108 lb 8 oz (49.2 kg)   05/12/17 108 lb (49 kg)       Physical Exam:  GENERAL: Alert, appears fatigued.    EYES: Conjunctiva pink, sclera white, no exudates.   EARS: TMs pearly grey, no bulging, redness, retraction.   MOUTH: Pharynx moist, pink without exudate. No tonsillar enlargement  NECK: No lymphadenopathy.   CV: Regular rate and rhythm without murmurs, rubs or gallops.  RESP: Rales in left base, otherwise clear.    Labs:    No results found for this or any previous visit (from the past 168 hour(s)).

## 2021-06-15 PROBLEM — L57.0 ACTINIC KERATOSIS: Status: ACTIVE | Noted: 2017-05-12

## 2021-06-15 PROBLEM — Z98.82 H/O BILATERAL BREAST IMPLANTS: Status: ACTIVE | Noted: 2017-05-12

## 2021-06-15 PROBLEM — B00.1 RECURRENT COLD SORES: Status: ACTIVE | Noted: 2017-05-12

## 2021-06-15 PROBLEM — M81.0 OSTEOPOROSIS: Status: ACTIVE | Noted: 2017-05-12

## 2021-06-16 PROBLEM — H04.203 BILATERAL EPIPHORA: Status: ACTIVE | Noted: 2020-08-28

## 2021-06-16 PROBLEM — R63.6 UNDERWEIGHT: Status: ACTIVE | Noted: 2020-12-17

## 2021-06-16 PROBLEM — Z86.0100 PERSONAL HISTORY OF COLONIC POLYPS: Status: ACTIVE | Noted: 2021-05-26

## 2021-06-16 PROBLEM — G47.00 INSOMNIA: Status: ACTIVE | Noted: 2020-08-28

## 2021-06-16 PROBLEM — E87.1 HYPONATREMIA: Status: ACTIVE | Noted: 2021-05-26

## 2021-06-17 NOTE — TELEPHONE ENCOUNTER
Telephone Encounter by Bisi Spear at 12/21/2020 10:20 AM     Author: Bisi Spear Service: -- Author Type: --    Filed: 12/21/2020 10:21 AM Encounter Date: 12/18/2020 Status: Signed    : Bisi Spear APPROVED:    Approval start date: 09/19/2020  Approval end date:  12/31/2020    Pharmacy has been notified of approval and will contact patient when medication is ready for pickup.

## 2021-06-18 NOTE — PATIENT INSTRUCTIONS - HE
Patient Instructions by Keturah Vasquez CMA at 12/17/2020  8:20 AM     Author: Keturah Vasquez CMA Service: -- Author Type: Certified Medical Assistant    Filed: 12/17/2020  8:52 AM Encounter Date: 12/17/2020 Status: Addendum    : Andrea Cruz MD (Physician)    Related Notes: Original Note by Andrea Cruz MD (Physician) filed at 12/17/2020  8:50 AM       Annual wellness visit, she is generally been feeling well since our previous visit of August 28, 2020.  Unfortunately her  got seriously ill at the beginning of October, initially urosepsis, then he went into the ICU with respiratory failure and cardiac problems, and he continues to be hospitalized at the [facility], had a cardiorespiratory arrest when he was about to be transferred to a TCU, now he has a trach, with intermittent ventilatory support.  This is all been extremely stressful for Ms. Michele, especially because she cannot see him in person, only on video.      We do not need any laboratory testing today, since we did a sent August 28, 2020 which included a satisfactory thyroid cascade, blood cell counts, and comprehensive metabolic panel (only abnormal value with sodium of 133 which I do not think is significant).    I like to see her back in about 6 months, and we can do some monitoring blood test at that time.  6 months from now would also be about the time to run her next DEXA scan.    I also encouraged her to put together her advanced directive and living will documents.  She told me these issues have been brought to the forefront during her 's illness.      Osteoporosis for which she has been on alendronate for approximately 4 years, stopping in 2019 (planned to start bisphosphonate drug holiday), let us have her repeat bone density scanning in May 2021.  [facility] endocrinology had recommended her to continue alendronate through 2020 to complete 5 years.  However she decided to stop it in 2019.  That  means she had 4 years of alendronate.  A drug holiday typically last for 5 years, then alendronate could be restarted.  Last DEXA scan was May 2019 done at [facility] with a femoral neck T score of -2.5, lumbar spine T score -2.2.     I reminded her about the importance of staying on her calcium and vitamin D supplements.  List plan to recheck DEXA bone density scan in May 2021, which will be a 2-year interval from her last test at [facility].  Her posture is good.  She is no history of fractures.  She stays physically active.  Her gait is stable.     Blood pressure is a bit elevated today at 158/90.  I asked her to keep track of her blood pressure with her home machine, and let me know if the systolic number at rest is repeatedly above 145.  I wonder some of the blood pressure elevation has been because of stress with her 's illness.    Bilateral epiphora, takes doxycycline low-dose 50 mg once a day, monitored at [facility], status post bilateral intraocular implants.      Earwax on the left, I suggested to use an over-the-counter wax dissolving eardrop kit, example brand Debrox or Murine.  These are available in the ear care section of the pharmacy.    Mild alopecia and spider veins.     Insomnia for which he uses every day Ambien, but I really want her to cease alcohol consumption since she uses Ambien.  She told me that alcohol consumption is 2 glasses of wine per day.     Recurrent herpes simplex labialis (cold sores) takes Valtrex 5 mg tablet intermittently.      Colon polyp, 5 mm in the transverse seen on colonoscopy November 23, 2020.  Recheck in 5 years would be entirely optional.  She told me that the prep was really unpleasant, to the point that it made her vomit.  I told her that I think she could probably stop doing screening colonoscopy, especially since she also had a negative Cologuard on November 6, 2020    Slightly underweight, body mass index around 18.  She assured me that she  is eating healthy food.  Yet she still getting a fair number of calories from the 2 glasses of wine per night.  I asked her to think about dialing back to wine, maybe even consider stopping, and eat more food.    Immunizations up-to-date including seasonal flu vaccine which she received on September 20, 2020.  Has received both pneumococcal vaccines, and also got both recombinant shingles shots..    Immunization History   Administered Date(s) Administered   ? Hep A, historic 05/12/2011   ? Influenza H5v6-15, 01/06/2010   ? Influenza high dose,seasonal,PF, 65+ yrs 09/28/2020   ? Influenza, inj, historic,unspecified 09/24/2010, 09/21/2011, 10/01/2012, 10/29/2013, 09/21/2014, 09/13/2015, 09/06/2016, 10/01/2016, 09/29/2017   ? Pneumo Conj 13-V (2010&after) 11/01/2016   ? Pneumo Polysac 23-V 08/28/2020   ? Td,adult,historic,unspecified 04/12/2000, 05/12/2011   ? ZOSTER, LIVE 05/18/2009   ? ZOSTER, RECOMBINANT, IM 05/13/2019       Patient Education     Exercise for a Healthier Heart  You may wonder how you can improve the health of your heart. If youre thinking about exercise, youre on the right track. You dont need to become an athlete, but you do need a certain amount of brisk exercise to help strengthen your heart. If you have been diagnosed with a heart condition, your doctor may recommend exercise to help stabilize your condition. To help make exercise a habit, choose safe, fun activities.       Be sure to check with your health care provider before starting an exercise program.    Why exercise?  Exercising regularly offers many healthy rewards. It can help you do all of the following:    Improve your blood cholesterol levels to help prevent further heart trouble    Lower your blood pressure to help prevent a stroke or heart attack    Control diabetes, or reduce your risk of getting this disease    Improve your heart and lung function    Reach and maintain a healthy weight    Make your muscles stronger and more  limber so you can stay active    Prevent falls and fractures by slowing the loss of bone mass (osteoporosis)    Manage stress better  Exercise tips  Ease into your routine. Set small goals. Then build on them.  Exercise on most days. Aim for a total of 150 or more minutes of moderate to  vigorous intensity activity each week. Consider 40 minutes, 3 to 4 times a week. For best results, activity should last for 40 minutes on average. It is OK to work up to the 40 minute period over time. Examples of moderate-intensity activity is walking one mile in 15 minutes or 30 to 45 minutes of yard work.  Step up your daily activity level. Along with your exercise program, try being more active throughout the day. Walk instead of drive. Do more household tasks or yard work.  Choose one or more activities you enjoy. Walking is one of the easiest things you can do. You can also try swimming, riding a bike, or taking an exercise class.  Stop exercising and call your doctor if you:    Have chest pain or feel dizzy or lightheaded    Feel burning, tightness, pressure, or heaviness in your chest, neck, shoulders, back, or arms    Have unusual shortness of breath    Have increased joint or muscle pain    Have palpitations or an irregular heartbeat      9346-6382 [employer]. [address] [place] [address], [place] [address]. All rights reserved. This information is not intended as a substitute for professional medical care. Always follow your healthcare professional's instructions.         Patient Education   Alcohol Use   Many people can enjoy a glass of wine or beer without any negative consequences to their health. According to the Centers for Disease Control and Prevention (CDC), having one or fewer drinks per day for women and two or fewer per day for men is considered moderate drinking.     When people drink more than moderately, it can become concerning. Excessive drinking is defined as consuming 15 drinks or more per week  for men and 8 drinks or more per week for women. There are various health problems associated with excessive drinking, which include:    Damage to vital organs like the heart, brain, liver and pancreas    Harm to the digestive tract    Weaken the immune system    Higher risk for heart disease and cancer         Advance Directive  Patients advance directive was discussed and I am comfortable with the patients wishes.  Patient Education   Personalized Prevention Plan  You are due for the preventive services outlined below.  Your care team is available to assist you in scheduling these services.  If you have already completed any of these items, please share that information with your care team to update in your medical record.  Health Maintenance   Topic Date Due   ? HEPATITIS C SCREENING  1945   ? DEXA SCAN  1945   ? LIPID  04/12/1991   ? ZOSTER VACCINES (3 of 3) 07/08/2019   ? TD 18+ HE  05/12/2021   ? MAMMOGRAM  05/22/2021   ? MEDICARE ANNUAL WELLNESS VISIT  12/17/2021   ? FALL RISK ASSESSMENT  12/17/2021   ? ADVANCE CARE PLANNING  12/17/2025   ? COLORECTAL CANCER SCREENING  11/23/2030   ? Pneumococcal Vaccine: 65+ Years  Completed   ? INFLUENZA VACCINE RULE BASED  Completed   ? Pneumococcal Vaccine: Pediatrics (0 to 5 Years) and At-Risk Patients (6 to 64 Years)  Aged Out   ? HEPATITIS B VACCINES  Aged Out

## 2021-06-20 NOTE — LETTER
Letter by Andrea Cruz MD at      Author: Andrea Cruz MD Service: -- Author Type: --    Filed:  Encounter Date: 8/31/2020 Status: (Other)         Alma Michele  [address] [facility] [address]             August 31, 2020         Dear Ms. Michele,    Below are the results from your recent visit.  Your test results look fine.  The sodium of 133 is not cause for concern.  It is not a new phenomenon for you.  I found a sodium concentration of 134 measured at [facility] August 4, 2015.  I do not think it represents any underlying metabolic problem.    Your TSH thyroid test is normal, which is the first thing to check if you see a reduced sodium concentration.    The remainder of your metabolic panel, which includes tests of liver and kidney function, was completely normal.  Your blood cell counts are totally normal.    Resulted Orders   Comprehensive Metabolic Panel   Result Value Ref Range    Sodium 133 (L) 136 - 145 mmol/L    Potassium 4.8 3.5 - 5.0 mmol/L    Chloride 98 98 - 107 mmol/L    CO2 27 22 - 31 mmol/L    Anion Gap, Calculation 8 5 - 18 mmol/L    Glucose 79 70 - 125 mg/dL    BUN 18 8 - 28 mg/dL    Creatinine 0.73 0.60 - 1.10 mg/dL    GFR MDRD Af Amer >60 >60 mL/min/1.73m2    GFR MDRD Non Af Amer >60 >60 mL/min/1.73m2    Bilirubin, Total 0.6 0.0 - 1.0 mg/dL    Calcium 9.8 8.5 - 10.5 mg/dL    Protein, Total 7.0 6.0 - 8.0 g/dL    Albumin 4.2 3.5 - 5.0 g/dL    Alkaline Phosphatase 62 45 - 120 U/L    AST 26 0 - 40 U/L    ALT 21 0 - 45 U/L    Narrative    Fasting Glucose reference range is 70-99 mg/dL per  American Diabetes Association (ADA) guidelines.   Thyroid Cascade   Result Value Ref Range    TSH 2.15 0.30 - 5.00 uIU/mL   HM1 (CBC with Diff)   Result Value Ref Range    WBC 4.6 4.0 - 11.0 thou/uL    RBC 4.04 3.80 - 5.40 mill/uL    Hemoglobin 12.7 12.0 - 16.0 g/dL    Hematocrit 38.3 35.0 - 47.0 %    MCV 95 80 - 100 fL    MCH 31.4 27.0 - 34.0 pg    MCHC 33.2 32.0 - 36.0 g/dL    RDW 12.1 11.0 - 14.5  %    Platelets 197 140 - 440 thou/uL    MPV 12.4 8.5 - 12.5 fL    Neutrophils % 51 50 - 70 %    Lymphocytes % 34 20 - 40 %    Monocytes % 9 2 - 10 %    Eosinophils % 3 0 - 6 %    Basophils % 2 0 - 2 %    Immature Granulocyte % 0 <=0 %    Neutrophils Absolute 2.4 2.0 - 7.7 thou/uL    Lymphocytes Absolute 1.6 0.8 - 4.4 thou/uL    Monocytes Absolute 0.4 0.0 - 0.9 thou/uL    Eosinophils Absolute 0.2 0.0 - 0.4 thou/uL    Basophils Absolute 0.1 0.0 - 0.2 thou/uL    Immature Granulocyte Absolute 0.0 <=0.0 thou/uL       Please call with questions or contact us using [facility].    Sincerely,        Electronically signed by Andrea Cruz MD

## 2021-06-25 NOTE — PROGRESS NOTES
Office Visit - Follow Up   Alma Michele   76 y.o. female    Date of Visit: 5/26/2021    Chief Complaint   Patient presents with     Follow up high BP     (158/90) in December - (128/78) Today with amlodipine         -------------------------------------------------------------------------------------------------------------------------  Assessment and Plan    Follow-up for multiple issues, overall has been doing well since our annual wellness visit of December 17, 2020.    Since then, she spent approximately 2 months in the [facility], returned in mid May, but did have a emergency department visit May 8, 2021 with dehydration probably from viral gastroenteritis.    I looked at the lab work she had done in the emergency department there in [place], and it showed that her metabolic panel was essentially normal except for mild hyponatremia at 133, similar to what be seen here.      Blood cell counts were good.    Today per my plan, will order her an updated DEXA scan to check her osteoporosis status.  I will also recheck her basic metabolic panel, especially to note the sodium concentration, and also recheck her thyroid cascade.    Lets plan to meet again in approximately October or November for Annual Wellness Visit    Her  is now back home after a serious illness in the autumn 2020, he no longer needs a trach.    Essential hypertension, blood pressures appear well controlled on a small dose of amlodipine 2.5 mg a day.  Her doctor in [place] put her on a small dose of amlodipine and I agree.  Blood pressure here in clinic looks great at 128/78.      Underweight, body mass index around 17.   Her weight is slipped a few pounds, now down to 101 pounds with a body mass index that calculates at 17.  She believes that the gastroenteritis upset her appetite.  She told me that she is getting her diet back towards normal.  I offered a consultation with a dietitian, but she does not  feel she needs that.    Wt Readings from Last 3 Encounters:   05/26/21 101 lb 6 oz (46 kg)   12/17/20 107 lb 11.2 oz (48.9 kg)   08/28/20 112 lb 9.6 oz (51.1 kg)      Osteoporosis for which she has been on alendronate for approximately 4 years, stopping in 2019 (planned to start bisphosphonate drug holiday), let us have her repeat bone density scanning in May 2021 (now).  [facility] endocrinology had recommended her to continue alendronate through 2020 to complete 5 years.  However she decided to stop it in 2019.  That means she had 4 years of alendronate.  A drug holiday typically last for 5 years, then alendronate could be restarted.  Last DEXA scan was May 2019 done at [facility] with a femoral neck T score of -2.5, lumbar spine T score -2.2.     I reminded her about the importance of staying on her calcium and vitamin D supplements.  List plan to recheck DEXA bone density scan in May 2021, which will be a 2-year interval from her last test at [facility].  Her posture is good.  She is no history of fractures.  She stays physically active.  Her gait is stable.     Bilateral epiphora, takes doxycycline low-dose 50 mg once a day, monitored at [facility], status post bilateral intraocular implants.       Earwax, I suggested to use an over-the-counter wax dissolving eardrop kit, example brand Debrox or Murine     Mild alopecia and spider veins      Insomnia for which he uses every day Ambien, but I really want her to cease alcohol consumption since she uses Ambien.  She told me that alcohol consumption is 2 glasses of wine per day.     Recurrent herpes simplex labialis (cold sores) takes Valtrex 500 mg tablet intermittently.      Colon polyp, 5 mm in the transverse seen on colonoscopy November 23, 2020.  Recheck in 5 years would be entirely optional.  She told me that the prep was really unpleasant, to the point that it made her vomit.  I told her that I think she could probably stop doing screening  colonoscopy, especially since she also had a negative Cologuard on November 6, 2020     Immunizations up-to-date  Has received both pneumococcal vaccines, and also got both recombinant shingles shots..  COVID-19,PF,Moderna 02/12/2021, 03/12/2021         --------------------------------------------------------------------------------------------------------------------------  History of Present Illness  This 76 y.o. old     Follow-up for multiple issues, overall has been doing well since our annual wellness visit of December 17, 2020.    Since then, she spent approximately 2 months in the [facility], returned in mid May, but did have a emergency department visit May 8, 2021 with dehydration probably from viral gastroenteritis.    I looked at the lab work she had done in the emergency department there in [place], and it showed that her metabolic panel was essentially normal except for mild hyponatremia at 133, similar to what be seen here.      Blood cell counts were good.    Today per my plan, will order her an updated DEXA scan to check her osteoporosis status.  I will also recheck her basic metabolic panel, especially to note the sodium concentration, and also recheck her thyroid cascade.    Lets plan to meet again in approximately October or November for Annual Wellness Visit    Her  is now back home after a serious illness in the autumn 2020, he no longer needs a trach.    Essential hypertension, blood pressures appear well controlled on a small dose of amlodipine 2.5 mg a day.  Her doctor in [place] put her on a small dose of amlodipine and I agree.  Blood pressure here in clinic looks great at 128/78.      Wt Readings from Last 3 Encounters:   05/26/21 101 lb 6 oz (46 kg)   12/17/20 107 lb 11.2 oz (48.9 kg)   08/28/20 112 lb 9.6 oz (51.1 kg)     BP Readings from Last 3 Encounters:   05/26/21 128/78   12/17/20 158/90   08/28/20 130/86       Lab Results   Component Value  Date    WBC 4.6 08/28/2020    HGB 12.7 08/28/2020    HCT 38.3 08/28/2020     08/28/2020    ALT 21 08/28/2020    AST 26 08/28/2020     (L) 08/28/2020    K 4.8 08/28/2020    CL 98 08/28/2020    CREATININE 0.73 08/28/2020    BUN 18 08/28/2020    CO2 27 08/28/2020    TSH 2.15 08/28/2020          ---------------------------------------------------------------------------------------------------------------------------    Medications, Allergies, Social, and Problem List   Current Outpatient Medications   Medication Sig Dispense Refill     amLODIPine (NORVASC) 2.5 MG tablet TAKE 1 TABLET(2.5 MG) BY MOUTH DAILY       aspirin 81 MG EC tablet Take 81 mg by mouth daily.       calcium carbonate (OS-TIMOTHY) 600 mg calcium (1,500 mg) tablet Take 600 mg by mouth 2 (two) times a day with meals.       cholecalciferol, vitamin D3, (VITAMIN D3) 2,000 unit cap Take 1 capsule by mouth daily.       doxycycline (MONODOX) 50 MG capsule Take 1 capsule (50 mg total) by mouth daily. 90 capsule 3     ibuprofen (ADVIL,MOTRIN) 200 MG tablet Take 400 mg by mouth every 6 (six) hours as needed for pain.       ELIZABETH 128 5 % ophthalmic ointment CORRIE 0.25 INCH TO BOTH EYES QPM       ondansetron (ZOFRAN) 4 MG tablet        valACYclovir (VALTREX) 500 MG tablet Take 1 tablet (500 mg total) by mouth 2 (two) times a day as needed. 20 tablet 1     zolpidem (AMBIEN) 10 mg tablet Take 0.5 tablets (5 mg total) by mouth at bedtime as needed for sleep. 90 tablet 0     No current facility-administered medications for this visit.      No Known Allergies  Social History     Tobacco Use     Smoking status: Former Smoker     Years: 4.00     Smokeless tobacco: Never Used   Substance Use Topics     Alcohol use: Not on file     Drug use: No     Patient Active Problem List   Diagnosis     Recurrent cold sores     Osteoporosis     Actinic keratosis     H/O bilateral breast implants     Bilateral epiphora     Insomnia     Underweight     Hyponatremia     Personal  "history of colonic polyps        Reviewed, reconciled and updated       Physical Exam   General Appearance:   Appears well, blood pressure nicely controlled    /78 (Patient Site: Right Arm, Patient Position: Sitting, Cuff Size: Adult Regular)   Pulse 63   Temp 98.5  F (36.9  C) (Oral)   Ht 5' 4.75\" (1.645 m)   Wt 101 lb 6 oz (46 kg)   SpO2 99%   BMI 17.00 kg/m           Additional Information   I spent 20 minutes on this encounter, including reviewing interval history since last visit, examining the patient, explaining and counseling the issues enumerated in the Assessment and Plan (patient given a copy), ordering indicated tests       Andrea Cruz MD    "

## 2021-06-26 NOTE — PATIENT INSTRUCTIONS - HE
Follow-up for multiple issues, overall has been doing well since our annual wellness visit of December 17, 2020.    Since then, she spent approximately 2 months in the [facility], returned in mid May, but did have a emergency department visit May 8, 2021 with dehydration probably from viral gastroenteritis.    I looked at the lab work she had done in the emergency department there in [place], and it showed that her metabolic panel was essentially normal except for mild hyponatremia at 133, similar to what be seen here.      Blood cell counts were good.    Today per my plan, will order her an updated DEXA scan to check her osteoporosis status.  I will also recheck her basic metabolic panel, especially to note the sodium concentration, and also recheck her thyroid cascade.    Lets plan to meet again in approximately October or November for Annual Wellness Visit    Her  is now back home after a serious illness in the autumn 2020, he no longer needs a trach.    Essential hypertension, blood pressures appear well controlled on a small dose of amlodipine 2.5 mg a day.  Her doctor in [place] put her on a small dose of amlodipine and I agree.  Blood pressure here in clinic looks great at 128/78.      Underweight, body mass index around 17.   Her weight is slipped a few pounds, now down to 101 pounds with a body mass index that calculates at 17.  She believes that the gastroenteritis upset her appetite.  She told me that she is getting her diet back towards normal.  I offered a consultation with a dietitian, but she does not feel she needs that.    Wt Readings from Last 3 Encounters:   05/26/21 101 lb 6 oz (46 kg)   12/17/20 107 lb 11.2 oz (48.9 kg)   08/28/20 112 lb 9.6 oz (51.1 kg)      Osteoporosis for which she has been on alendronate for approximately 4 years, stopping in 2019 (planned to start bisphosphonate drug holiday), let us have her repeat bone density scanning in May  2021 (now).  [facility] endocrinology had recommended her to continue alendronate through 2020 to complete 5 years.  However she decided to stop it in 2019.  That means she had 4 years of alendronate.  A drug holiday typically last for 5 years, then alendronate could be restarted.  Last DEXA scan was May 2019 done at [facility] with a femoral neck T score of -2.5, lumbar spine T score -2.2.     I reminded her about the importance of staying on her calcium and vitamin D supplements.  List plan to recheck DEXA bone density scan in May 2021, which will be a 2-year interval from her last test at [facility].  Her posture is good.  She is no history of fractures.  She stays physically active.  Her gait is stable.     Bilateral epiphora, takes doxycycline low-dose 50 mg once a day, monitored at [facility], status post bilateral intraocular implants.       Earwax, I suggested to use an over-the-counter wax dissolving eardrop kit, example brand Debrox or Murine     Mild alopecia and spider veins      Insomnia for which he uses every day Ambien, but I really want her to cease alcohol consumption since she uses Ambien.  She told me that alcohol consumption is 2 glasses of wine per day.     Recurrent herpes simplex labialis (cold sores) takes Valtrex 500 mg tablet intermittently.      Colon polyp, 5 mm in the transverse seen on colonoscopy November 23, 2020.  Recheck in 5 years would be entirely optional.  She told me that the prep was really unpleasant, to the point that it made her vomit.  I told her that I think she could probably stop doing screening colonoscopy, especially since she also had a negative Cologuard on November 6, 2020     Immunizations up-to-date  Has received both pneumococcal vaccines, and also got both recombinant shingles shots..  COVID-19,PF,Moderna 02/12/2021, 03/12/2021

## 2021-06-27 ENCOUNTER — HEALTH MAINTENANCE LETTER (OUTPATIENT)
Age: 76
End: 2021-06-27

## 2021-07-03 NOTE — ADDENDUM NOTE
Addendum Note by Savanah Boyd CNP at 11/16/2017  4:31 PM     Author: Savanah Boyd CNP Service: -- Author Type: Nurse Practitioner    Filed: 11/16/2017  4:31 PM Encounter Date: 11/16/2017 Status: Signed    : Savanah Boyd CNP (Nurse Practitioner)    Addended by: SAVANAH BOYD on: 11/16/2017 04:31 PM        Modules accepted: Orders

## 2021-07-04 NOTE — LETTER
Letter by Andrea Cruz MD at      Author: Andrea rCuz MD Service: -- Author Type: --    Filed:  Encounter Date: 5/27/2021 Status: (Other)         Alma Michele  [address] [place] [address]             May 27, 2021         Dear Ms. Michele,      Normal TSH thyroid test and basic metabolic panel.    Resulted Orders   Thyroid Cascade   Result Value Ref Range    TSH 1.88 0.30 - 5.00 uIU/mL   Basic Metabolic Panel   Result Value Ref Range    Sodium 139 136 - 145 mmol/L    Potassium 4.6 3.5 - 5.0 mmol/L    Chloride 101 98 - 107 mmol/L    CO2 27 22 - 31 mmol/L    Anion Gap, Calculation 11 5 - 18 mmol/L    Glucose 84 70 - 125 mg/dL    Calcium 9.7 8.5 - 10.5 mg/dL    BUN 18 8 - 28 mg/dL    Creatinine 0.72 0.60 - 1.10 mg/dL    GFR MDRD Af Amer >60 >60 mL/min/1.73m2    GFR MDRD Non Af Amer >60 >60 mL/min/1.73m2    Narrative    Fasting Glucose reference range is 70-99 mg/dL per  American Diabetes Association (ADA) guidelines.           Please call with questions or contact us using REPPt.    Sincerely,        Electronically signed by Andrea Cruz MD

## 2021-07-06 VITALS
WEIGHT: 101.38 LBS | OXYGEN SATURATION: 99 % | HEIGHT: 65 IN | BODY MASS INDEX: 16.89 KG/M2 | SYSTOLIC BLOOD PRESSURE: 128 MMHG | TEMPERATURE: 98.5 F | HEART RATE: 63 BPM | DIASTOLIC BLOOD PRESSURE: 78 MMHG

## 2021-08-18 ENCOUNTER — TELEPHONE (OUTPATIENT)
Dept: INTERNAL MEDICINE | Facility: CLINIC | Age: 76
End: 2021-08-18

## 2021-08-18 NOTE — TELEPHONE ENCOUNTER
Refill Request  Medication name: No prescriptions requested or ordered in this encounter    Who prescribed the medication: PCP  Last refill on medication: 3/1/21  Requested Pharmacy: [facility]  Last appointment with PCP: 5/26/21  Next appointment: patient is switching pharmacies and this med cannot be transferred

## 2021-09-21 ENCOUNTER — OFFICE VISIT (OUTPATIENT)
Dept: INTERNAL MEDICINE | Facility: CLINIC | Age: 76
End: 2021-09-21
Payer: MEDICARE

## 2021-09-21 VITALS
DIASTOLIC BLOOD PRESSURE: 78 MMHG | OXYGEN SATURATION: 99 % | SYSTOLIC BLOOD PRESSURE: 110 MMHG | HEART RATE: 73 BPM | WEIGHT: 104 LBS | BODY MASS INDEX: 17.44 KG/M2

## 2021-09-21 DIAGNOSIS — E78.00 HYPERCHOLESTEROLEMIA: Primary | ICD-10-CM

## 2021-09-21 DIAGNOSIS — Z11.59 NEED FOR HEPATITIS C SCREENING TEST: ICD-10-CM

## 2021-09-21 DIAGNOSIS — M81.0 AGE-RELATED OSTEOPOROSIS WITHOUT CURRENT PATHOLOGICAL FRACTURE: ICD-10-CM

## 2021-09-21 LAB
CHOLEST SERPL-MCNC: 197 MG/DL
FASTING STATUS PATIENT QL REPORTED: NO
HDLC SERPL-MCNC: 82 MG/DL
LDLC SERPL CALC-MCNC: 104 MG/DL
PTH-INTACT SERPL-MCNC: 37 PG/ML (ref 10–86)
TRIGL SERPL-MCNC: 54 MG/DL

## 2021-09-21 PROCEDURE — 83516 IMMUNOASSAY NONANTIBODY: CPT | Performed by: INTERNAL MEDICINE

## 2021-09-21 PROCEDURE — 36415 COLL VENOUS BLD VENIPUNCTURE: CPT | Performed by: INTERNAL MEDICINE

## 2021-09-21 PROCEDURE — 86803 HEPATITIS C AB TEST: CPT | Performed by: INTERNAL MEDICINE

## 2021-09-21 PROCEDURE — 82306 VITAMIN D 25 HYDROXY: CPT | Performed by: INTERNAL MEDICINE

## 2021-09-21 PROCEDURE — 83970 ASSAY OF PARATHORMONE: CPT | Performed by: INTERNAL MEDICINE

## 2021-09-21 PROCEDURE — 80061 LIPID PANEL: CPT | Performed by: INTERNAL MEDICINE

## 2021-09-21 PROCEDURE — 99215 OFFICE O/P EST HI 40 MIN: CPT | Mod: 25 | Performed by: INTERNAL MEDICINE

## 2021-09-21 RX ORDER — ALENDRONATE SODIUM 70 MG/1
70 TABLET ORAL
Qty: 12 TABLET | Refills: 3 | Status: SHIPPED | OUTPATIENT
Start: 2021-09-21 | End: 2022-11-21

## 2021-09-21 NOTE — PATIENT INSTRUCTIONS
1.  Restart Alendronate 70 mg weekly.    2.  Recheck bone density in one year with clinic visit to follow.    3.  Light weight bearing exercise    4.  Desired calcium intake 1200 mg to 1500 mg between diet and supplement.

## 2021-09-22 LAB — HCV AB SERPL QL IA: NONREACTIVE

## 2021-09-22 NOTE — PROGRESS NOTES
ASSESSMENT:  1. Age-related osteoporosis without current pathological fracture  Alma has a long history of osteoporosis, but no previous fragility fractures.  She had been on alendronate for four years through [facility], and has been off since 2019.  She had previous bone density studies at [facility] since 2005 which were reviewed.  Most recent bone density study was done at the [facility] site.  This cannot be directly compared with the [facility] study since they were done on different machines.  However, T-scores were lower in both total hips and in the AP spine compared to the [facility] study from 5/21/2019.  Management options were discussed.  She does have low BMI.  She will have labs drawn to screen for secondary causes of low bone density.  After discussion, she is interested in restarting alendronate.  Risks for atypical fractures with antiresorptive agents was discussed along with other potential benefits and adverse effects from therapy  - Vitamin D Deficiency; Future  - Parathyroid Hormone Intact; Future  - Tissue transglutaminase andria IgA and IgG; Future  - alendronate (FOSAMAX) 70 MG tablet; Take 1 tablet (70 mg) by mouth every 7 days  Dispense: 12 tablet; Refill: 3  - Vitamin D Deficiency  - Parathyroid Hormone Intact  - Tissue transglutaminase andria IgA and IgG    2. Need for hepatitis C screening test  Hepatitis C screening will be done  - Hepatitis C Screen Reflex to HCV RNA Quant and Genotype; Future  - Hepatitis C Screen Reflex to HCV RNA Quant and Genotype    3. Hypercholesterolemia  She is is interested in having lipids drawn since other lab studies are being done  - Lipid panel reflex to direct LDL Fasting; Future  - Lipid panel reflex to direct LDL Fasting    PLAN:  Patient Instructions   1.  Restart Alendronate 70 mg weekly.    2.  Recheck bone density in one year with clinic visit to follow.    3.  Light weight bearing exercise    4.  Desired calcium intake 1200 mg to 1500 mg between  diet and supplement.  She was given printed information on calcium content from common foods    Orders Placed This Encounter   Procedures     REVIEW OF HEALTH MAINTENANCE PROTOCOL ORDERS     Hepatitis C Screen Reflex to HCV RNA Quant and Genotype     Lipid panel reflex to direct LDL Fasting     Vitamin D Deficiency     Parathyroid Hormone Intact     Tissue transglutaminase andria IgA and IgG     There are no discontinued medications.    Return in about 1 year (around 9/21/2022) for Follow up.    ASSESSED PROBLEMS:  See above      CHIEF COMPLAINT:  Osteoporosis assessment    HISTORY OF PRESENT ILLNESS:  Alma is a 76 year old female seen at the request of Dr. Cruz for evaluation of osteoporosis.  She previously received the majority of her health care at [facility].  She has had serial bone density studies done dating back to 2005 with the most recent on 5/21/2019.  She had been on alendronate for 4 years prior to discontinuing in 2019.  She had no adverse effects from the medication.  She has no history of fragility fractures.  She denies nephrolithiasis, or prolonged steroid use.  She is postmenopausal.  There is no history of excessive alcohol intake or tobacco use.  BMI is low, and she acknowledges some weight loss    REVIEW OF SYSTEMS:    Comprehensive review of systems is otherwise negative.    PFSH:  .  Spends the winter in [place]    TOBACCO USE:  History   Smoking Status     Former Smoker     Years: 4.00   Smokeless Tobacco     Never Used       VITALS:  Vitals:    09/21/21 1053   BP: 110/78   BP Location: Left arm   Patient Position: Sitting   Cuff Size: Adult Regular   Pulse: 73   SpO2: 99%   Weight: 47.2 kg (104 lb)     Wt Readings from Last 3 Encounters:   09/21/21 47.2 kg (104 lb)   05/26/21 46 kg (101 lb 6 oz)   12/17/20 48.9 kg (107 lb 11.2 oz)       PHYSICAL EXAM:  Constitutional:   Reveals an alert pleasant slender woman with appropriate affect.  She does not seem in acute distress.  She gets  up from a chair without use of her arms and ambulates easily without postural instability noted.    Vitals: per nursing notes.  HEENT:   Atraumatic.    Eyes: No conjunctival hyperemia  Neck:  Supple, no carotid bruits or adenopathy.  Back:  No spine or CVA pain.  No abnormal thoracic kyphosis  Thorax:  No bony deformities.  Lungs: Clear to A&P without rales or wheezes.  Respiratory effort normal.  Cardiac:   Regular rate and rhythm, normal S1, S2, no murmur or gallop.  Abdomen:  Soft, active bowel sounds without bruits, mass, or tenderness.  Extremities:   No peripheral edema    Skin: Tanned.  No jaundice, peripheral cyanosis or lesions to suggest malignancy.  Neuro:  Alert and oriented.   No gross focal deficits.  Psychiatric:  Memory intact, mood appropriate.      RE: Alma Michele  YOB: 1945           Dear Andrea Cruz,     Patient Profile:  76 y.o. female, postmenopausal, is here for the first bone density test.   History of fractures - None. Family history of osteoporosis - None.  Family history of hip fracture: None. Smoking history - Past. Osteoporosis treatment past -  Yes;  Bisphosphonates. Osteoporosis treatment current - No.  Chronic medical problems -   None. High risk medications -  None.        Assessment:     1. The spine bone density L1-L2 with T-score -2.4.  2. Femoral bone densities show left total hip T- score -2.9 and right total hip T-score -2.7.  3. Trabecular bone score indicates poor trabecular bone architecture.        76 y.o. female with OSTEOPOROSIS and HIGH fracture risk.     Previous scan was done on a different machine and is not directly comparable with the current study.     Recommendations:  Appropriate evaluation and treatment recommended with follow up bone density scan after 1 year of active treatment.      DATA REVIEWED:  Additional History from Old Records Summarized (2): [facility] records reviewed .  Decision to Obtain Records (1): None.   Radiology Tests  Summarized or Ordered (1): DEXA is reviewed and ordered  Labs Reviewed or Ordered (1):  labs reviewed and ordered  Medicine Test Summarized or Ordered (1): None.   Independent Review of EKG or X-RAY(2 each): None.    The visit lasted a total of 40 minutes face to face with the patient. Over 50% of the time was spent counseling and educating the patient about evaluation and management of osteoporosis..        Dragon dictation was used for this note. Speech recognition errors are a possibility.     MEDICATIONS:  Current Outpatient Medications   Medication Sig Dispense Refill     alendronate (FOSAMAX) 70 MG tablet Take 1 tablet (70 mg) by mouth every 7 days 12 tablet 3     amLODIPine (NORVASC) 2.5 MG tablet [AMLODIPINE (NORVASC) 2.5 MG TABLET] TAKE 1 TABLET(2.5 MG) BY MOUTH DAILY       aspirin 81 MG EC tablet [ASPIRIN 81 MG EC TABLET] Take 81 mg by mouth daily.       calcium carbonate (OS-TIMOTHY) 600 mg calcium (1,500 mg) tablet [CALCIUM CARBONATE (OS-TIMOTHY) 600 MG CALCIUM (1,500 MG) TABLET] Take 600 mg by mouth 2 (two) times a day with meals.       cholecalciferol, vitamin D3, (VITAMIN D3) 2,000 unit cap [CHOLECALCIFEROL, VITAMIN D3, (VITAMIN D3) 2,000 UNIT CAP] Take 1 capsule by mouth daily.       doxycycline (MONODOX) 50 MG capsule [DOXYCYCLINE (MONODOX) 50 MG CAPSULE] Take 1 capsule (50 mg total) by mouth daily. 90 capsule 3     ibuprofen (ADVIL,MOTRIN) 200 MG tablet [IBUPROFEN (ADVIL,MOTRIN) 200 MG TABLET] Take 400 mg by mouth every 6 (six) hours as needed for pain.       ELIZABETH 128 5 % ophthalmic ointment [ELIZABETH 128 5 % OPHTHALMIC OINTMENT] CORRIE 0.25 INCH TO BOTH EYES QPM       ondansetron (ZOFRAN) 4 MG tablet [ONDANSETRON (ZOFRAN) 4 MG TABLET]        valACYclovir (VALTREX) 500 MG tablet [VALACYCLOVIR (VALTREX) 500 MG TABLET] Take 1 tablet (500 mg total) by mouth 2 (two) times a day as needed. 20 tablet 1     zolpidem (AMBIEN) 10 mg tablet [ZOLPIDEM (AMBIEN) 10 MG TABLET] Take 0.5 tablets (5 mg total) by mouth at bedtime  as needed for sleep. 90 tablet 0

## 2021-09-23 ENCOUNTER — TELEPHONE (OUTPATIENT)
Dept: LAB | Facility: CLINIC | Age: 76
End: 2021-09-23

## 2021-09-23 LAB
DEPRECATED CALCIDIOL+CALCIFEROL SERPL-MC: 115 UG/L (ref 30–80)
TTG IGA SER-ACNC: 0.3 U/ML
TTG IGG SER-ACNC: <0.6 U/ML

## 2021-09-27 DIAGNOSIS — G47.00 INSOMNIA, UNSPECIFIED TYPE: ICD-10-CM

## 2021-09-27 RX ORDER — ZOLPIDEM TARTRATE 10 MG/1
5 TABLET ORAL
Qty: 90 TABLET | Refills: 0 | Status: SHIPPED | OUTPATIENT
Start: 2021-09-27 | End: 2022-03-18

## 2021-09-27 NOTE — TELEPHONE ENCOUNTER
Reason for Call:  Medication or medication refill:    Do you use a [facility]?  Name of the pharmacy and phone number for the current request:  [facility] in [facility] [address]    Name of the medication requested: zolpidem (AMBIEN) 10 mg tablet    Can we leave a detailed message on this number? YES    Phone number patient can be reached at: Cell number on file:    Telephone Information:   Mobile [phone]       Best Time: anytime    Call taken on 9/27/2021 at 12:48 PM by Abi Drew

## 2021-09-28 ENCOUNTER — TRANSFERRED RECORDS (OUTPATIENT)
Dept: HEALTH INFORMATION MANAGEMENT | Facility: CLINIC | Age: 76
End: 2021-09-28

## 2021-10-05 ENCOUNTER — TRANSFERRED RECORDS (OUTPATIENT)
Dept: HEALTH INFORMATION MANAGEMENT | Facility: CLINIC | Age: 76
End: 2021-10-05

## 2021-10-17 ENCOUNTER — HEALTH MAINTENANCE LETTER (OUTPATIENT)
Age: 76
End: 2021-10-17

## 2021-12-14 ENCOUNTER — OFFICE VISIT (OUTPATIENT)
Dept: INTERNAL MEDICINE | Facility: CLINIC | Age: 76
End: 2021-12-14
Payer: MEDICARE

## 2021-12-14 VITALS
TEMPERATURE: 98 F | HEIGHT: 64 IN | HEART RATE: 66 BPM | OXYGEN SATURATION: 100 % | BODY MASS INDEX: 17.58 KG/M2 | SYSTOLIC BLOOD PRESSURE: 142 MMHG | WEIGHT: 103 LBS | DIASTOLIC BLOOD PRESSURE: 90 MMHG

## 2021-12-14 DIAGNOSIS — B00.1 RECURRENT COLD SORES: ICD-10-CM

## 2021-12-14 DIAGNOSIS — M81.0 AGE-RELATED OSTEOPOROSIS WITHOUT CURRENT PATHOLOGICAL FRACTURE: ICD-10-CM

## 2021-12-14 DIAGNOSIS — Z00.00 ROUTINE GENERAL MEDICAL EXAMINATION AT A HEALTH CARE FACILITY: Primary | ICD-10-CM

## 2021-12-14 DIAGNOSIS — R63.6 UNDERWEIGHT: ICD-10-CM

## 2021-12-14 DIAGNOSIS — G47.00 INSOMNIA, UNSPECIFIED TYPE: ICD-10-CM

## 2021-12-14 PROCEDURE — G0439 PPPS, SUBSEQ VISIT: HCPCS | Performed by: INTERNAL MEDICINE

## 2021-12-14 ASSESSMENT — MIFFLIN-ST. JEOR: SCORE: 947.2

## 2021-12-14 ASSESSMENT — ACTIVITIES OF DAILY LIVING (ADL): CURRENT_FUNCTION: NO ASSISTANCE NEEDED

## 2021-12-14 NOTE — PATIENT INSTRUCTIONS
Annual wellness visit, overall doing well since her last meeting of May 26, 2021.  Since then, she is resume taking alendronate for osteoporosis, as detailed below.    She does not need any laboratory work-up today, because she had a satisfactory lipid panel that was run September 21, 2021, and basic metabolic panel and thyroid cascade run May 26, 2021.    No changes to her medication regimen.  I would like her to collect more blood pressure data as detailed below.    She will be departing for the [facility] after New Year's Day 2022, plans to be there about 4 months.      Gravelly voice  She has no pain in her throat or neck, and I do not palpate any abnormalities.  I told her 1 possibilities of vocal cord polyps, although she does not strain her vocal cords.  She has never had a smoking habit.  I told her that if she wants to pursue evaluation, that would be an otolaryngology consultation, which would include fiberoptic laryngoscopy.  She will let me know if she wants to proceed with that.    Essential hypertension, blood pressures running a bit higher today December 14, 2021  With a reading of 142/90.  I think her blood pressure probably is well controlled, but I would like her to get her home blood pressure machine validated against a manual reading.  One of her daughters is a nurse and could do that comparison.  Then she can use her automatic machine to collect data.  I would like to see her resting systolic pressure below 130, diastolic 85 or less.    If blood pressures running higher than that, then we could easily bump up the amlodipine to 5 mg once a day.     Underweight, body mass index around 17.   I would like to see her adult a few pounds of healthy lean muscle mass.     Wt Readings from Last 5 Encounters:   12/14/21 46.7 kg (103 lb)   09/21/21 47.2 kg (104 lb)   05/26/21 46 kg (101 lb 6 oz)   12/17/20 48.9 kg (107 lb 11.2 oz)   08/28/20 51.1 kg (112 lb 9.6 oz)      Osteoporosis for which she has been on alendronate for approximately 4 years, stopping in 2019, restarted June 2021  She is doing fine after restart of alendronate, no problems with swallowing.  She is taking it properly on an empty stomach, first thing the morning with a small glass of water, and staying upright for at least an hour    [facility] endocrinology had recommended her to continue alendronate through 2020 to complete 5 years.  However she decided to stop it in 2019.  DEXA scan was May 2019 done at [facility] with a femoral neck T score of -2.5, lumbar spine T score -2.2.    Fariba 3 2021  1. The spine bone density L1-L2 with T-score -2.4.  2. Femoral bone densities show left total hip T- score -2.9 and right total hip T-score -2.7.  3. Trabecular bone score indicates poor trabecular bone architecture.     I reminded her about the importance of staying on her calcium and vitamin D supplements. She is no history of fractures.  She stays physically active.  Her gait is stable.     Bilateral epiphora, takes doxycycline low-dose 50 mg once a day, monitored at [facility],   Status post bilateral intraocular implants.       Earwax, I suggested to use an over-the-counter wax dissolving eardrop kit, example brand Debrox or Murine     Mild alopecia and spider veins      Insomnia for which he uses every day Ambien, but I really want her to cease alcohol consumption since she uses Ambien.  She told me that alcohol consumption is 1 glass of wine per day.      Recurrent herpes simplex labialis (cold sores) takes Valtrex 500 mg tablet intermittently.       Colon polyp, 5 mm in the transverse seen on colonoscopy November 23, 2020.  Recheck in 5 years would be entirely optional.  She told me that the prep was really unpleasant, to the point that it made her vomit.  I told her that I think she could probably stop doing screening colonoscopy, especially since she also had a negative Cologuard on November 6,  2020     Immunizations up-to-date  Third shot Moderna COVID October 25, 2021  Has received both pneumococcal vaccines, and also got both recombinant shingles shots..    Immunization History   Administered Date(s) Administered     COVID-19,PF,Moderna 02/12/2021, 03/12/2021, 10/25/2021     Flu, Unspecified 09/24/2010, 09/21/2011, 10/01/2012, 10/29/2013, 09/21/2014, 09/13/2015, 09/06/2016, 10/01/2016, 09/29/2017     HepA, Unspecified 05/12/2011     Influenza (H1N1) 01/06/2010     Influenza (High Dose) 3 valent vaccine 09/28/2020     Influenza Vaccine IM > 6 months Valent IIV4 (Alfuria,Fluzone) 11/02/2018     Influenza Vaccine Im 4yrs+ 4 Valent [id number] 10/17/2019     Influenza, Quad, High Dose, Pf, 65yr+ (Fluzone HD) 09/28/2020, 10/16/2021     Pneumo Conj 13-V (2010&after) 11/01/2016     Pneumococcal 23 valent 08/28/2020     Td,adult,historic,unspecified 04/12/2000, 05/12/2011     Zoster vaccine recombinant adjuvanted (SHINGRIX) 08/21/2018, 05/13/2019     Zoster vaccine, live 05/18/2009

## 2021-12-14 NOTE — PROGRESS NOTES
"SUBJECTIVE:   Alma Michele is a 76 year old female who presents for Preventive Visit.    Patient has been advised of split billing requirements and indicates understanding: Yes   Are you in the first 12 months of your Medicare coverage?  No    Healthy Habits:     In general, how would you rate your overall health?  Excellent    Frequency of exercise:  None    Do you usually eat at least 4 servings of fruit and vegetables a day, include whole grains    & fiber and avoid regularly eating high fat or \"junk\" foods?  Yes    Taking medications regularly:  Yes    Ability to successfully perform activities of daily living:  No assistance needed    Home Safety:  No safety concerns identified    Hearing Impairment:  Difficulty following a conversation in a noisy restaurant or crowded room and difficulty understanding soft or whispered speech    In the past 6 months, have you been bothered by leaking of urine?  No    In general, how would you rate your overall mental or emotional health?  Excellent      PHQ-2 Total Score: 0    Additional concerns today:  Yes    Do you feel safe in your environment? Yes    Have you ever done Advance Care Planning? (For example, a Health Directive, POLST, or a discussion with a medical provider or your loved ones about your wishes): Yes, advance care planning is on file.      Fall risk  Fallen 2 or more times in the past year?: No  Any fall with injury in the past year?: No       Cognitive Screening   1) Repeat 3 items (Leader, Season, Table)    2) Clock draw: NORMAL  3) 3 item recall: Recalls 3 objects  Results: 3 items recalled: COGNITIVE IMPAIRMENT LESS LIKELY    Mini-CogTM Copyright BARBARA Conklin. Licensed by the author for use in [facility]; reprinted with permission (yaneli@.[facility]). All rights reserved.      Do you have sleep apnea, excessive snoring or daytime drowsiness?: no    Reviewed and updated as needed this visit by clinical staff  Tobacco  Allergies  Meds       " "      Reviewed and updated as needed this visit by Provider    Meds            Social History     Tobacco Use     Smoking status: Former Smoker     Years: 4.00     Smokeless tobacco: Never Used   Substance Use Topics     Alcohol use: Not on file     If you drink alcohol do you typically have >3 drinks per day or >7 drinks per week? No    Alcohol Use 12/14/2021   Prescreen: >3 drinks/day or >7 drinks/week? No   Prescreen: >3 drinks/day or >7 drinks/week? -   Current providers sharing in care for this patient include:   Patient Care Team:  Andrea Cruz MD as PCP - General (Internal Medicine)  Andrea Cruz MD as Assigned PCP    The following health maintenance items are reviewed in Epic and correct as of today:  Health Maintenance Due   Topic Date Due     LUNG CANCER SCREENING  Never done     DTAP/TDAP/TD IMMUNIZATION (1 - Tdap) 05/13/2011     MAMMO SCREENING  11/09/2018     FALL RISK ASSESSMENT  12/17/2021       Pertinent mammograms are reviewed under the imaging tab.    Review of Systems  Constitutional, HEENT, cardiovascular, pulmonary, gi and gu systems are negative, except as otherwise noted.    OBJECTIVE:   BP (!) 142/90 (BP Location: Right arm, Patient Position: Sitting)   Pulse 66   Temp 98  F (36.7  C)   Ht 1.626 m (5' 4\")   Wt 46.7 kg (103 lb)   SpO2 100%   BMI 17.68 kg/m   Estimated body mass index is 17.68 kg/m  as calculated from the following:    Height as of this encounter: 1.626 m (5' 4\").    Weight as of this encounter: 46.7 kg (103 lb).  Physical Exam    General: Alert, in no distress  Thin body habitus  Skin: No significant lesion seen.  Eyes/nose/throat: Eyes without scleral icterus, eye movements normal, pupils equal and reactive, oropharynx clear, ears with normal TM's  + earwax on right  MSK: Neck with good ROM  Lymphatic: Neck without adenopathy or masses  Endocrine: Thyroid with no nodules to palpation  Pulm: Lungs clear to auscultation bilaterally  Cardiac: Heart with " regular rate and rhythm, no murmur or gallop  GI: Abdomen soft, nontender. No palpable enlargement of liver or spleen  MSK: Extremities no tenderness or edema  Neuro: Moves all extremities, without focal weakness  Psych: Alert, normal mental status. Normal affect and speech    ASSESSMENT / PLAN:     Annual wellness visit, overall doing well since her last meeting of May 26, 2021.  Since then, she is resume taking alendronate for osteoporosis, as detailed below.    She does not need any laboratory work-up today, because she had a satisfactory lipid panel that was run September 21, 2021, and basic metabolic panel and thyroid cascade run May 26, 2021.    No changes to her medication regimen.  I would like her to collect more blood pressure data as detailed below.    She will be departing for the [facility] after New Year's Day 2022, plans to be there about 4 months.      Gravelly voice  She has no pain in her throat or neck, and I do not palpate any abnormalities.  I told her 1 possibilities of vocal cord polyps, although she does not strain her vocal cords.  She has never had a smoking habit.  I told her that if she wants to pursue evaluation, that would be an otolaryngology consultation, which would include fiberoptic laryngoscopy.  She will let me know if she wants to proceed with that.    Essential hypertension, blood pressures running a bit higher today December 14, 2021  With a reading of 142/90.  I think her blood pressure probably is well controlled, but I would like her to get her home blood pressure machine validated against a manual reading.  One of her daughters is a nurse and could do that comparison.  Then she can use her automatic machine to collect data.  I would like to see her resting systolic pressure below 130, diastolic 85 or less.    If blood pressures running higher than that, then we could easily bump up the amlodipine to 5 mg once a day.     Underweight, body mass  index around 17.   I would like to see her adult a few pounds of healthy lean muscle mass.     Wt Readings from Last 5 Encounters:   12/14/21 46.7 kg (103 lb)   09/21/21 47.2 kg (104 lb)   05/26/21 46 kg (101 lb 6 oz)   12/17/20 48.9 kg (107 lb 11.2 oz)   08/28/20 51.1 kg (112 lb 9.6 oz)     Osteoporosis for which she has been on alendronate for approximately 4 years, stopping in 2019, restarted June 2021  She is doing fine after restart of alendronate, no problems with swallowing.  She is taking it properly on an empty stomach, first thing the morning with a small glass of water, and staying upright for at least an hour    [facility] endocrinology had recommended her to continue alendronate through 2020 to complete 5 years.  However she decided to stop it in 2019.  DEXA scan was May 2019 done at [facility] with a femoral neck T score of -2.5, lumbar spine T score -2.2.    Fariba 3 2021  1. The spine bone density L1-L2 with T-score -2.4.  2. Femoral bone densities show left total hip T- score -2.9 and right total hip T-score -2.7.  3. Trabecular bone score indicates poor trabecular bone architecture.     I reminded her about the importance of staying on her calcium and vitamin D supplements. She is no history of fractures.  She stays physically active.  Her gait is stable.     Bilateral epiphora, takes doxycycline low-dose 50 mg once a day, monitored at [facility],   Status post bilateral intraocular implants.       Earwax, I suggested to use an over-the-counter wax dissolving eardrop kit, example brand Debrox or Murine     Mild alopecia and spider veins      Insomnia for which he uses every day Ambien, but I really want her to cease alcohol consumption since she uses Ambien.  She told me that alcohol consumption is 1 glass of wine per day.      Recurrent herpes simplex labialis (cold sores) takes Valtrex 500 mg tablet intermittently.       Colon polyp, 5 mm in the transverse seen on colonoscopy November  "23, 2020.  Recheck in 5 years would be entirely optional.  She told me that the prep was really unpleasant, to the point that it made her vomit.  I told her that I think she could probably stop doing screening colonoscopy, especially since she also had a negative Cologuard on November 6, 2020     Immunizations up-to-date  Third shot Moderna COVID October 25, 2021  Has received both pneumococcal vaccines, and also got both recombinant shingles shots..    Immunization History   Administered Date(s) Administered     COVID-19,PF,Moderna 02/12/2021, 03/12/2021, 10/25/2021     Flu, Unspecified 09/24/2010, 09/21/2011, 10/01/2012, 10/29/2013, 09/21/2014, 09/13/2015, 09/06/2016, 10/01/2016, 09/29/2017     HepA, Unspecified 05/12/2011     Influenza (H1N1) 01/06/2010     Influenza (High Dose) 3 valent vaccine 09/28/2020     Influenza Vaccine IM > 6 months Valent IIV4 (Alfuria,Fluzone) 11/02/2018     Influenza Vaccine Im 4yrs+ 4 Valent CCIIV4 10/17/2019     Influenza, Quad, High Dose, Pf, 65yr+ (Fluzone HD) 09/28/2020, 10/16/2021     Pneumo Conj 13-V (2010&after) 11/01/2016     Pneumococcal 23 valent 08/28/2020     Td,adult,historic,unspecified 04/12/2000, 05/12/2011     Zoster vaccine recombinant adjuvanted (SHINGRIX) 08/21/2018, 05/13/2019     Zoster vaccine, live 05/18/2009       Patient has been advised of split billing requirements and indicates understanding: Yes  COUNSELING:  Reviewed preventive health counseling, as reflected in patient instructions       Healthy diet/nutrition    Estimated body mass index is 17.68 kg/m  as calculated from the following:    Height as of this encounter: 1.626 m (5' 4\").    Weight as of this encounter: 46.7 kg (103 lb).    Weight management plan noted, stable and monitoring    She reports that she has quit smoking. She quit after 4.00 years of use. She has never used smokeless tobacco.      Appropriate preventive services were discussed with this patient, including applicable screening as " appropriate for cardiovascular disease, diabetes, osteopenia/osteoporosis, and glaucoma.  As appropriate for age/gender, discussed screening for colorectal cancer, prostate cancer, breast cancer, and cervical cancer. Checklist reviewing preventive services available has been given to the patient.    Reviewed patients plan of care and provided an AVS. The Basic Care Plan (routine screening as documented in Health Maintenance) for Alma meets the Care Plan requirement. This Care Plan has been established and reviewed with the Patient.      RAJ JUNG MD  [facility]    Identified Health Risks:

## 2021-12-21 DIAGNOSIS — H04.203 BILATERAL EPIPHORA: ICD-10-CM

## 2021-12-23 RX ORDER — DOXYCYCLINE 50 MG/1
CAPSULE ORAL
Qty: 90 CAPSULE | Refills: 3 | Status: SHIPPED | OUTPATIENT
Start: 2021-12-23 | End: 2022-12-02

## 2021-12-23 NOTE — TELEPHONE ENCOUNTER
Routing refill request to provider for review/approval because:  Drug not on the [facility] refill protocol     Last Written Prescription Date:  12/17/20  Last Fill Quantity: 90,  # refills: 3   Last office visit provider:  12/14/21     Requested Prescriptions   Pending Prescriptions Disp Refills     doxycycline monohydrate (MONODOX) 50 MG capsule [Pharmacy Med Name: DOXYCYCLINE MONO 50 MG CAP] 90 capsule 3     Sig: TAKE 1 CAPSULE BY MOUTH DAILY       There is no refill protocol information for this order          Haseeb Zamorano RN 12/23/21 12:59 PM

## 2022-03-18 ENCOUNTER — MYC REFILL (OUTPATIENT)
Dept: INTERNAL MEDICINE | Facility: CLINIC | Age: 77
End: 2022-03-18
Payer: MEDICARE

## 2022-03-18 DIAGNOSIS — G47.00 INSOMNIA, UNSPECIFIED TYPE: ICD-10-CM

## 2022-03-19 NOTE — TELEPHONE ENCOUNTER
Routing refill request to provider for review/approval because:  Drug not on [place] [facility] [place] - controlled substance request     Last Written Prescription Date:  9/27/2021  Last Fill Quantity: 90,  # refills: 0   Last office visit provider:  12/14/2021     Requested Prescriptions   Pending Prescriptions Disp Refills     zolpidem (AMBIEN) 10 MG tablet 90 tablet 0     Sig: Take 0.5 tablets (5 mg) by mouth nightly as needed       There is no refill protocol information for this order          Sara Marsh RN 03/19/22 5:57 PM

## 2022-03-22 RX ORDER — ZOLPIDEM TARTRATE 10 MG/1
5 TABLET ORAL
Qty: 90 TABLET | Refills: 0 | Status: SHIPPED | OUTPATIENT
Start: 2022-03-22 | End: 2022-03-25

## 2022-03-25 DIAGNOSIS — G47.00 INSOMNIA, UNSPECIFIED TYPE: ICD-10-CM

## 2022-03-25 DIAGNOSIS — B00.1 RECURRENT COLD SORES: ICD-10-CM

## 2022-03-25 RX ORDER — ZOLPIDEM TARTRATE 10 MG/1
5 TABLET ORAL
Qty: 90 TABLET | Refills: 0 | Status: SHIPPED | OUTPATIENT
Start: 2022-03-25 | End: 2022-06-17

## 2022-03-25 RX ORDER — VALACYCLOVIR HYDROCHLORIDE 500 MG/1
500 TABLET, FILM COATED ORAL 2 TIMES DAILY PRN
Qty: 20 TABLET | Refills: 1 | Status: SHIPPED | OUTPATIENT
Start: 2022-03-25 | End: 2022-11-18

## 2022-03-25 NOTE — TELEPHONE ENCOUNTER
Please send Rx the this location     [facility]  [id number] [address]   [phone] today please.

## 2022-03-25 NOTE — TELEPHONE ENCOUNTER
Patient requests new RX: Cannot transfer other RX Rx from old location due to federal regulations. Need their own RX Please    Refill Request  Medication name: Pending Prescriptions:                       Disp   Refills    valACYclovir (VALTREX) 500 MG tablet      20 tab*1            Sig: Take 1 tablet (500 mg) by mouth 2 times daily as           needed    Who prescribed the medication: Anthony  Last refill on medication: Unknown  Requested Pharmacy: [facility]  Last appointment with PCP: 12/14/21  Next appointment: Not due

## 2022-04-25 ENCOUNTER — TRANSFERRED RECORDS (OUTPATIENT)
Dept: HEALTH INFORMATION MANAGEMENT | Facility: CLINIC | Age: 77
End: 2022-04-25

## 2022-05-02 ENCOUNTER — TRANSFERRED RECORDS (OUTPATIENT)
Dept: HEALTH INFORMATION MANAGEMENT | Facility: CLINIC | Age: 77
End: 2022-05-02

## 2022-05-16 ENCOUNTER — TRANSFERRED RECORDS (OUTPATIENT)
Dept: HEALTH INFORMATION MANAGEMENT | Facility: CLINIC | Age: 77
End: 2022-05-16

## 2022-06-14 ENCOUNTER — TRANSFERRED RECORDS (OUTPATIENT)
Dept: HEALTH INFORMATION MANAGEMENT | Facility: CLINIC | Age: 77
End: 2022-06-14
Payer: MEDICARE

## 2022-06-16 ENCOUNTER — TRANSFERRED RECORDS (OUTPATIENT)
Dept: HEALTH INFORMATION MANAGEMENT | Facility: CLINIC | Age: 77
End: 2022-06-16

## 2022-06-17 ENCOUNTER — TELEPHONE (OUTPATIENT)
Dept: INTERNAL MEDICINE | Facility: CLINIC | Age: 77
End: 2022-06-17
Payer: MEDICARE

## 2022-06-17 ENCOUNTER — MYC REFILL (OUTPATIENT)
Dept: INTERNAL MEDICINE | Facility: CLINIC | Age: 77
End: 2022-06-17
Payer: MEDICARE

## 2022-06-17 DIAGNOSIS — G47.00 INSOMNIA, UNSPECIFIED TYPE: ICD-10-CM

## 2022-06-17 NOTE — TELEPHONE ENCOUNTER
ALTERNATIVE REQUESTED BECAUSE INSURANCE IS REQUIRING A QUANTITY OVERRIDE PA FOR PATIENT TO GET MORE THAN 90 PER YEAR. THIS IS FOR ZOLPIDEM TARTRATE 10 MG TAB.

## 2022-06-18 NOTE — TELEPHONE ENCOUNTER
Routing refill request to provider for review/approval because:  Drug not on the [facility] refill protocol     Last Written Prescription Date:  3/25/22  Last Fill Quantity: 90,  # refills: 0   Last office visit provider:  12/14/21     Requested Prescriptions   Pending Prescriptions Disp Refills     zolpidem (AMBIEN) 10 MG tablet 90 tablet 0     Sig: Take 0.5 tablets (5 mg) by mouth nightly as needed       There is no refill protocol information for this order          Asha Appiah, RN 06/17/22 8:26 PM

## 2022-06-20 RX ORDER — ZOLPIDEM TARTRATE 10 MG/1
5 TABLET ORAL
Qty: 90 TABLET | Refills: 0 | Status: SHIPPED | OUTPATIENT
Start: 2022-06-20 | End: 2022-12-31

## 2022-06-28 ENCOUNTER — TRANSFERRED RECORDS (OUTPATIENT)
Dept: HEALTH INFORMATION MANAGEMENT | Facility: CLINIC | Age: 77
End: 2022-06-28

## 2022-07-14 ENCOUNTER — TRANSFERRED RECORDS (OUTPATIENT)
Dept: HEALTH INFORMATION MANAGEMENT | Facility: CLINIC | Age: 77
End: 2022-07-14

## 2022-07-28 ENCOUNTER — TRANSFERRED RECORDS (OUTPATIENT)
Dept: HEALTH INFORMATION MANAGEMENT | Facility: CLINIC | Age: 77
End: 2022-07-28

## 2022-09-28 ENCOUNTER — TRANSFERRED RECORDS (OUTPATIENT)
Dept: HEALTH INFORMATION MANAGEMENT | Facility: CLINIC | Age: 77
End: 2022-09-28

## 2022-10-02 ENCOUNTER — HEALTH MAINTENANCE LETTER (OUTPATIENT)
Age: 77
End: 2022-10-02

## 2022-10-03 ENCOUNTER — TRANSFERRED RECORDS (OUTPATIENT)
Dept: HEALTH INFORMATION MANAGEMENT | Facility: CLINIC | Age: 77
End: 2022-10-03

## 2022-10-12 ENCOUNTER — TRANSFERRED RECORDS (OUTPATIENT)
Dept: HEALTH INFORMATION MANAGEMENT | Facility: CLINIC | Age: 77
End: 2022-10-12

## 2022-10-14 ENCOUNTER — TRANSFERRED RECORDS (OUTPATIENT)
Dept: HEALTH INFORMATION MANAGEMENT | Facility: CLINIC | Age: 77
End: 2022-10-14

## 2022-10-21 ENCOUNTER — TRANSFERRED RECORDS (OUTPATIENT)
Dept: HEALTH INFORMATION MANAGEMENT | Facility: CLINIC | Age: 77
End: 2022-10-21

## 2022-10-31 ENCOUNTER — TELEPHONE (OUTPATIENT)
Dept: INTERNAL MEDICINE | Facility: CLINIC | Age: 77
End: 2022-10-31

## 2022-10-31 DIAGNOSIS — I10 ESSENTIAL HYPERTENSION, BENIGN: ICD-10-CM

## 2022-10-31 RX ORDER — AMLODIPINE BESYLATE 2.5 MG/1
2.5 TABLET ORAL 2 TIMES DAILY
Qty: 180 TABLET | Refills: 3 | Status: SHIPPED | OUTPATIENT
Start: 2022-10-31 | End: 2023-10-13

## 2022-10-31 NOTE — TELEPHONE ENCOUNTER
Patient calling regarding amlodipine 2.5 mg tabs.     Pt reports this was switched from 2.5mg ONCE daily to 2.5mg TWICE daily by Dr. North.   She reports her BP was elevated so it was switched to twice daily.   She is unable to  her refill since the script is not correct anymore.     Current script:  amLODIPine (NORVASC) 2.5 MG tablet 90 tablet 3 4/27/2022  No   Sig: TAKE 1 TABLET BY MOUTH DAILY         She is requesting a new script written from twice daily.   She is leaving out of town on Thursday, needs this by then.    Pended new order below.     Routing refill request to provider for review/approval because:  New dose/script.     Requested Prescriptions   Pending Prescriptions Disp Refills     amLODIPine (NORVASC) 2.5 MG tablet 90 tablet 3     Sig: Take 2 tablets (5 mg) by mouth daily       There is no refill protocol information for this order        Thanks!  Melodie GONG

## 2022-11-01 ENCOUNTER — TRANSFERRED RECORDS (OUTPATIENT)
Dept: HEALTH INFORMATION MANAGEMENT | Facility: CLINIC | Age: 77
End: 2022-11-01

## 2022-11-01 ENCOUNTER — MEDICAL CORRESPONDENCE (OUTPATIENT)
Dept: HEALTH INFORMATION MANAGEMENT | Facility: CLINIC | Age: 77
End: 2022-11-01

## 2022-11-02 ENCOUNTER — TRANSCRIBE ORDERS (OUTPATIENT)
Dept: OTHER | Age: 77
End: 2022-11-02

## 2022-11-02 DIAGNOSIS — M53.3 SACROILIAC PAIN: Primary | ICD-10-CM

## 2022-11-08 ENCOUNTER — MEDICAL CORRESPONDENCE (OUTPATIENT)
Dept: HEALTH INFORMATION MANAGEMENT | Facility: CLINIC | Age: 77
End: 2022-11-08

## 2022-11-19 DIAGNOSIS — M81.0 AGE-RELATED OSTEOPOROSIS WITHOUT CURRENT PATHOLOGICAL FRACTURE: ICD-10-CM

## 2022-11-20 NOTE — TELEPHONE ENCOUNTER
"Routing refill request to provider for review/approval because:  Labs not current:  cr    Last Written Prescription Date:  9/21/21  Last Fill Quantity: 12,  # refills: 3   Last office visit provider:  12/14/21     Requested Prescriptions   Pending Prescriptions Disp Refills     alendronate (FOSAMAX) 70 MG tablet [Pharmacy Med Name: ALENDRONATE SODIUM 70 MG TAB] 12 tablet 3     Sig: TAKE 1 TABLET BY MOUTH EVERY 7 DAYS       Bisphosphonates Failed - 11/19/2022  8:45 AM        Failed - Normal serum creatinine on file within past 12 months     Recent Labs   Lab Test 05/26/21  1037   CR 0.72       Ok to refill medication if creatinine is low          Passed - Recent (12 mo) or future (30 days) visit within the authorizing provider's specialty     Patient has had an office visit with the authorizing provider or a provider within the authorizing providers department within the previous 12 mos or has a future within next 30 days. See \"Patient Info\" tab in inbasket, or \"Choose Columns\" in Meds & Orders section of the refill encounter.              Passed - Dexa on file within past 2 years     Please review last Dexa result.           Passed - Medication is active on med list        Passed - Patient is age 18 or older             Asha Appiah RN 11/20/22 11:55 AM  "

## 2022-11-21 RX ORDER — ALENDRONATE SODIUM 70 MG/1
TABLET ORAL
Qty: 12 TABLET | Refills: 3 | Status: SHIPPED | OUTPATIENT
Start: 2022-11-21 | End: 2023-09-26

## 2022-12-02 ENCOUNTER — OFFICE VISIT (OUTPATIENT)
Dept: INTERNAL MEDICINE | Facility: CLINIC | Age: 77
End: 2022-12-02
Payer: MEDICARE

## 2022-12-02 VITALS
HEART RATE: 73 BPM | HEIGHT: 64 IN | OXYGEN SATURATION: 98 % | SYSTOLIC BLOOD PRESSURE: 110 MMHG | DIASTOLIC BLOOD PRESSURE: 70 MMHG | BODY MASS INDEX: 17.93 KG/M2 | RESPIRATION RATE: 14 BRPM | WEIGHT: 105 LBS | TEMPERATURE: 98 F

## 2022-12-02 DIAGNOSIS — R63.6 UNDERWEIGHT: ICD-10-CM

## 2022-12-02 DIAGNOSIS — Z01.818 PREOP GENERAL PHYSICAL EXAM: Primary | ICD-10-CM

## 2022-12-02 DIAGNOSIS — M81.0 AGE-RELATED OSTEOPOROSIS WITHOUT CURRENT PATHOLOGICAL FRACTURE: ICD-10-CM

## 2022-12-02 DIAGNOSIS — I10 ESSENTIAL HYPERTENSION, BENIGN: ICD-10-CM

## 2022-12-02 DIAGNOSIS — H35.371 MACULAR PUCKERING OF RETINA, RIGHT: ICD-10-CM

## 2022-12-02 DIAGNOSIS — R52 PAIN, UNSPECIFIED: ICD-10-CM

## 2022-12-02 LAB
ERYTHROCYTE [DISTWIDTH] IN BLOOD BY AUTOMATED COUNT: 12 % (ref 10–15)
HCT VFR BLD AUTO: 38.7 % (ref 35–47)
HGB BLD-MCNC: 12.6 G/DL (ref 11.7–15.7)
MCH RBC QN AUTO: 31.5 PG (ref 26.5–33)
MCHC RBC AUTO-ENTMCNC: 32.6 G/DL (ref 31.5–36.5)
MCV RBC AUTO: 97 FL (ref 78–100)
PLATELET # BLD AUTO: 215 10E3/UL (ref 150–450)
RBC # BLD AUTO: 4 10E6/UL (ref 3.8–5.2)
WBC # BLD AUTO: 6.3 10E3/UL (ref 4–11)

## 2022-12-02 PROCEDURE — 36415 COLL VENOUS BLD VENIPUNCTURE: CPT | Performed by: INTERNAL MEDICINE

## 2022-12-02 PROCEDURE — 85027 COMPLETE CBC AUTOMATED: CPT | Performed by: INTERNAL MEDICINE

## 2022-12-02 PROCEDURE — 99214 OFFICE O/P EST MOD 30 MIN: CPT | Performed by: INTERNAL MEDICINE

## 2022-12-02 PROCEDURE — 80053 COMPREHEN METABOLIC PANEL: CPT | Performed by: INTERNAL MEDICINE

## 2022-12-02 PROCEDURE — 84443 ASSAY THYROID STIM HORMONE: CPT | Performed by: INTERNAL MEDICINE

## 2022-12-02 RX ORDER — GABAPENTIN 300 MG/1
CAPSULE ORAL
COMMUNITY
Start: 2022-11-17 | End: 2023-02-18

## 2022-12-02 NOTE — PROGRESS NOTES
[facility]  [address] [address]  Phone: [phone]  Fax: [phone]  Primary Provider: Andrea Cruz  Pre-op Performing Provider: ANDREA CRUZ    PREOPERATIVE EVALUATION:  Today's date: 12/2/2022    Alma Michele is a 77 year old female who presents for a preoperative evaluation.    Surgical Information:  Surgery/Procedure: Right eye retina surgery  Surgery Location: [facility]  Surgeon: Dr Farr  Surgery Date: 12/6/2022  Time of Surgery:   Where patient plans to recover: At home with family  Fax number for surgical facility: [facility] [phone]    Type of Anesthesia Anticipated: Local with MAC    Assessment & Plan     The proposed surgical procedure is considered LOW risk.    Satisfactory preoperative medical examination in anticipation of right eye retina surgery, scheduled for December 6, 2022 at [facility].  Indication is macular pucker    Alma is overall doing just fine.  Her weight is holding stable.   she stays physically active and mobility remains good, but she continues to be bothered by chronic low back pain, and will be having a consultation with [facility] spine surgeon Dr. Pickett for consideration of intervention on her sacroiliac joints and the maybe lumbar spine.  She has had an epidural lumbar steroid injection which was not particularly helpful, and then has also had an injection for sacroiliac joint that did yield a diagnostic analgesic response, but it was only temporary.    Although Alma does not really need lab work ahead of her eye surgery, we will take this opportunity to check her routine health maintenance lab tests.  She is not fasting this afternoon, but we can probably skip the lipid panel, since it was fine in 2021.  We will check a comprehensive metabolic panel, blood cell counts, and TSH thyroid test.  LATER: Metabolic panel, CBC, TSH all normal    Her blood  pressure looks great on amlodipine 2.5 mg taken twice a day.  On gabapentin for her back pain, taken at bedtime.    I told her that the night before her eye surgery, she can take her gabapentin per usual routine  The morning of her surgery, she can take her morning dose of amlodipine with a small sip of water.      RECOMMENDATION:  APPROVAL GIVEN to proceed with proposed procedure, without further diagnostic evaluation.    Subjective     HPI related to upcoming procedure:     Indication is macular pucker    She will be departing for the [facility] after New Year's Day     Essential hypertension  Amlodipine 2.5 mg twice a day  I think her blood pressure probably is well controlled  One of her daughters is a nurse.  Then she can use her automatic machine to collect data.  I would like to see her resting systolic pressure below 130, diastolic 85 or less.    Underweight, body mass index around 18.   I would like to see her adult a few pounds of healthy lean muscle mass.     Wt Readings from Last 5 Encounters:   12/02/22 47.6 kg (105 lb)   12/14/21 46.7 kg (103 lb)   09/21/21 47.2 kg (104 lb)   05/26/21 46 kg (101 lb 6 oz)   12/17/20 48.9 kg (107 lb 11.2 oz)     Spinal canal stenosis  Right L5 transforaminal epidural steroid injection July 14, 2022  Lumbar spine MRI June 16, 2022  Moderate lumbar spondylosis with broad-based disc herniations evident throughout the lumbar spine.  Disc bulging and facet degenerative changes resulting in severe spinal canal stenosis and lateral recess narrowing at L4-5 and moderate to severe spinal canal stenosis at L3-4.    Takes gabapentin 600 mg at bedtime (2 capsules of 300 mg each)    Sacroiliitis, underwent sacroiliac joint injection on the right side October 14, 2022.  Alma reported that that procedure actually was helpful for only about 3 hours    Alma is going to have a consultation with orthopedic spine surgery professor Dr. Sage Pickett  Coming up  January 5, 2023    Osteoporosis for which she has been on alendronate for approximately 4 years, stopping in 2019, restarted June 2021  She is doing fine after restart of alendronate, no problems with swallowing.  She is taking it properly on an empty stomach, first thing the morning with a small glass of water, and staying upright for at least an hour     [facility] endocrinology had recommended her to continue alendronate through 2020 to complete 5 years.  However she decided to stop it in 2019.  DEXA scan was May 2019 done at [facility] with a femoral neck T score of -2.5, lumbar spine T score -2.2.     Fariba 3 2021  1. The spine bone density L1-L2 with T-score -2.4.  2. Femoral bone densities show left total hip T- score -2.9 and right total hip T-score -2.7.  3. Trabecular bone score indicates poor trabecular bone architecture.     I reminded her about the importance of staying on her calcium and vitamin D supplements. She is no history of fractures.  She stays physically active.  Her gait is stable.    Gravelly voice  She has no pain in her throat or neck, and I do not palpate any abnormalities.  I told her 1 possibilities of vocal cord polyps, although she does not strain her vocal cords.  She has never had a smoking habit.  I told her that if she wants to pursue evaluation, that would be an otolaryngology consultation, which would include fiberoptic laryngoscopy.  She will let me know if she wants to proceed with that.    Bilateral epiphora, takes doxycycline low-dose 50 mg once a day, monitored at [facility], Status post bilateral intraocular implants.       Earwax, I suggested to use an over-the-counter wax dissolving eardrop kit, example brand Debrox or Murine     Mild alopecia and spider veins      Insomnia for which he uses every day Ambien, but I really want her to cease alcohol consumption since she uses Ambien.  She told me that alcohol consumption is 1 glass of wine per day.      Recurrent  herpes simplex labialis (cold sores) takes Valtrex 500 mg tablet intermittently.      She recall satisfactory screening mammogram done either March or April 2022 near [facility]     Colon polyp, 5 mm in the transverse seen on colonoscopy November 23, 2020.  Recheck in 5 years would be entirely optional.  She told me that the prep was really unpleasant, to the point that it made her vomit.  I told her that I think she could probably stop doing screening colonoscopy, especially since she also had a negative Cologuard on November 6, 2020     Immunizations up-to-date    COVID-19 Vaccine Bivalent Booster 18+ (Moderna) 10/29/2022     Pneumo Conj 13-V (2010&after) 11/01/2016   Pneumococcal 23 valent 08/28/2020     Influenza (High Dose) 3 valent vaccine 09/28/2020, 10/26/2022     Zoster vaccine recombinant adjuvanted (SHINGRIX) 08/21/2018, 05/13/2019         Preop Questions 12/2/2022   1. Have you ever had a heart attack or stroke? No   2. Have you ever had surgery on your heart or blood vessels, such as a stent placement, a coronary artery bypass, or surgery on an artery in your head, neck, heart, or legs? No   3. Do you have chest pain with activity? No   4. Do you have a history of  heart failure? No   5. Do you currently have a cold, bronchitis or symptoms of other infection? No   6. Do you have a cough, shortness of breath, or wheezing? No   7. Do you or anyone in your family have previous history of blood clots? No   8. Do you or does anyone in your family have a serious bleeding problem such as prolonged bleeding following surgeries or cuts? No   9. Have you ever had problems with anemia or been told to take iron pills? No   10. Have you had any abnormal blood loss such as black, tarry or bloody stools, or abnormal vaginal bleeding? No   11. Have you ever had a blood transfusion? No   12. Are you willing to have a blood transfusion if it is medically needed before, during, or after your surgery? Yes    13. Have you or any of your relatives ever had problems with anesthesia? No   14. Do you have sleep apnea, excessive snoring or daytime drowsiness? No   15. Do you have any artifical heart valves or other implanted medical devices like a pacemaker, defibrillator, or continuous glucose monitor? No   16. Do you have artificial joints? No   17. Are you allergic to latex? No         Review of Systems  CONSTITUTIONAL: NEGATIVE for fever, chills, change in weight  ENT/MOUTH: NEGATIVE for ear, mouth and throat problems  RESP: NEGATIVE for significant cough or SOB  CV: NEGATIVE for chest pain, palpitations or peripheral edema    Patient Active Problem List    Diagnosis Date Noted     Hyponatremia 05/26/2021     Priority: Medium     Personal history of colonic polyps 05/26/2021     Priority: Medium     Underweight 12/17/2020     Priority: Medium     Bilateral epiphora 08/28/2020     Priority: Medium     Insomnia 08/28/2020     Priority: Medium     Recurrent cold sores 05/12/2017     Priority: Medium     Osteoporosis 05/12/2017     Priority: Medium     Actinic keratosis 05/12/2017     Priority: Medium     H/O bilateral breast implants 05/12/2017     Priority: Medium      No past medical history on file.  No past surgical history on file.  Current Outpatient Medications   Medication Sig Dispense Refill     alendronate (FOSAMAX) 70 MG tablet TAKE 1 TABLET BY MOUTH EVERY 7 DAYS 12 tablet 3     amLODIPine (NORVASC) 2.5 MG tablet Take 1 tablet (2.5 mg) by mouth 2 times daily 180 tablet 3     calcium carbonate (OS-TIMOTHY) 600 mg calcium (1,500 mg) tablet [CALCIUM CARBONATE (OS-TIMOTHY) 600 MG CALCIUM (1,500 MG) TABLET] Take 600 mg by mouth 2 (two) times a day with meals.       cholecalciferol, vitamin D3, (VITAMIN D3) 2,000 unit cap [CHOLECALCIFEROL, VITAMIN D3, (VITAMIN D3) 2,000 UNIT CAP] Take 1 capsule by mouth daily.       gabapentin (NEURONTIN) 300 MG capsule TAKE 2 CAPSULES BY MOUTH EVERY DAY AT BEDTIME       ibuprofen  "(ADVIL,MOTRIN) 200 MG tablet [IBUPROFEN (ADVIL,MOTRIN) 200 MG TABLET] Take 400 mg by mouth every 6 (six) hours as needed for pain.       ELIZABETH 128 5 % ophthalmic ointment [ELIZABETH 128 5 % OPHTHALMIC OINTMENT] CORRIE 0.25 INCH TO BOTH EYES QPM       valACYclovir (VALTREX) 500 MG tablet Take 1 tablet (500 mg) by mouth 2 times daily as needed (In case of cold sore recurrence, take for 3 days, start as soon as symptoms appear) 20 tablet 1     zolpidem (AMBIEN) 10 MG tablet Take 0.5 tablets (5 mg) by mouth nightly as needed 90 tablet 0       No Known Allergies     Social History     Tobacco Use     Smoking status: Former     Years: 4.00     Types: Cigarettes     Smokeless tobacco: Never   Substance Use Topics     Alcohol use: Not on file       History   Drug Use No         Objective     /70 (BP Location: Right arm, Patient Position: Sitting)   Pulse 73   Temp 98  F (36.7  C)   Resp 14   Ht 1.626 m (5' 4\")   Wt 47.6 kg (105 lb)   SpO2 98%   BMI 18.02 kg/m      Physical Exam     General: Alert, in no distress  Skin: No significant lesion seen.  Eyes/nose/throat: Eyes without scleral icterus, eye movements normal, pupils equal and reactive, oropharynx clear, ears with normal TM's  MSK: Neck with good ROM  Lymphatic: Neck without adenopathy or masses  Endocrine: Thyroid with no nodules to palpation  Pulm: Lungs clear to auscultation bilaterally  Cardiac: Heart with regular rate and rhythm, no murmur or gallop  GI: Abdomen soft, nontender. No palpable enlargement of liver or spleen  MSK: Extremities no tenderness or edema  Neuro: Moves all extremities, without focal weakness  Psych: Alert, normal mental status. Normal affect and speech    Recent Labs   Lab Test 05/26/21  1037      POTASSIUM 4.6   CR 0.72        Diagnostics:  Recent Results (from the past 48 hour(s))   Comprehensive metabolic panel (BMP + Alb, Alk Phos, ALT, AST, Total. Bili, TP)    Collection Time: 12/02/22  3:36 PM   Result Value Ref Range    " Sodium 136 136 - 145 mmol/L    Potassium 4.6 3.4 - 5.3 mmol/L    Chloride 100 98 - 107 mmol/L    Carbon Dioxide (CO2) 22 22 - 29 mmol/L    Anion Gap 14 7 - 15 mmol/L    Urea Nitrogen 23.2 (H) 8.0 - 23.0 mg/dL    Creatinine 0.78 0.51 - 0.95 mg/dL    Calcium 8.8 8.8 - 10.2 mg/dL    Glucose 94 70 - 99 mg/dL    Alkaline Phosphatase 53 35 - 104 U/L    AST 27 10 - 35 U/L    ALT 17 10 - 35 U/L    Protein Total 7.0 6.4 - 8.3 g/dL    Albumin 4.4 3.5 - 5.2 g/dL    Bilirubin Total 0.3 <=1.2 mg/dL    GFR Estimate 78 >60 mL/min/1.73m2   CBC with platelets    Collection Time: 12/02/22  3:36 PM   Result Value Ref Range    WBC Count 6.3 4.0 - 11.0 10e3/uL    RBC Count 4.00 3.80 - 5.20 10e6/uL    Hemoglobin 12.6 11.7 - 15.7 g/dL    Hematocrit 38.7 35.0 - 47.0 %    MCV 97 78 - 100 fL    MCH 31.5 26.5 - 33.0 pg    MCHC 32.6 31.5 - 36.5 g/dL    RDW 12.0 10.0 - 15.0 %    Platelet Count 215 150 - 450 10e3/uL   TSH with free T4 reflex    Collection Time: 12/02/22  3:36 PM   Result Value Ref Range    TSH 1.84 0.30 - 4.20 uIU/mL          Revised Cardiac Risk Index (RCRI):  The patient has the following serious cardiovascular risks for perioperative complications:   - No serious cardiac risks = 0 points     RCRI Interpretation: 0 points: Class I (very low risk - 0.4% complication rate)    Signed Electronically by: RAJ JUNG MD  Copy of this evaluation report is provided to requesting physician.

## 2022-12-02 NOTE — PATIENT INSTRUCTIONS
Satisfactory preoperative medical examination in anticipation of right eye retina surgery, scheduled for December 6, 2022 at [facility].  Indication is macular pucker    Alma is overall doing just fine.  Her weight is holding stable.   she stays physically active and mobility remains good, but she continues to be bothered by chronic low back pain, and will be having a consultation with [facility] spine surgeon Dr. Pickett for consideration of intervention on her sacroiliac joints and the baby lumbar spine.  She has had an epidural lumbar steroid injection which was not particularly helpful, and then has also had an injection for sacroiliac joint that did yield a diagnostic analgesic response, but it was only temporary.    Although Alma does not really need lab work ahead of her eye surgery, we will take this opportunity to check her routine health maintenance lab test.  She is not fasting this afternoon, but we can probably skip the lipid panel, since it was fine in 2021.  We will check a comprehensive metabolic panel, blood cell counts, and TSH thyroid test.    Her blood pressure looks great on amlodipine 2.5 mg taken twice a day.  On gabapentin for her back pain, taken at bedtime.    I told her that the night before her eye surgery, she can take her gabapentin per usual routine  The morning of her surgery, she can take her morning dose of amlodipine with a small sip of water.        She will be departing for the [facility] after New Year's Day     Essential hypertension  Amlodipine 2.5 mg twice a day  I think her blood pressure probably is well controlled  One of her daughters is a nurse.  Then she can use her automatic machine to collect data.  I would like to see her resting systolic pressure below 130, diastolic 85 or less.    Underweight, body mass index around 17.   I would like to see her adult a few pounds of healthy lean muscle mass.     Wt Readings from  Last 5 Encounters:   12/02/22 47.6 kg (105 lb)   12/14/21 46.7 kg (103 lb)   09/21/21 47.2 kg (104 lb)   05/26/21 46 kg (101 lb 6 oz)   12/17/20 48.9 kg (107 lb 11.2 oz)     Spinal canal stenosis  Right L5 transforaminal epidural steroid injection July 14, 2022  Lumbar spine MRI June 16, 2022  Moderate lumbar spondylosis with broad-based disc herniations evident throughout the lumbar spine.  Disc bulging and facet degenerative changes resulting in severe spinal canal stenosis and lateral recess narrowing at L4-5 and moderate to severe spinal canal stenosis at L3-4.    Takes gabapentin 600 mg at bedtime (2 capsules of 300 mg each)    Sacroiliitis, underwent sacroiliac joint injection on the right side October 14, 2022.  Alma reported that that procedure actually was helpful for only about 3 hours    Alma is going to have a consultation with orthopedic spine surgery professor Dr. Sage Pickett  Coming up January 5, 2023    Osteoporosis for which she has been on alendronate for approximately 4 years, stopping in 2019, restarted June 2021  She is doing fine after restart of alendronate, no problems with swallowing.  She is taking it properly on an empty stomach, first thing the morning with a small glass of water, and staying upright for at least an hour     [facility] endocrinology had recommended her to continue alendronate through 2020 to complete 5 years.  However she decided to stop it in 2019.  DEXA scan was May 2019 done at [facility] with a femoral neck T score of -2.5, lumbar spine T score -2.2.     Fariba 3 2021  1. The spine bone density L1-L2 with T-score -2.4.  2. Femoral bone densities show left total hip T- score -2.9 and right total hip T-score -2.7.  3. Trabecular bone score indicates poor trabecular bone architecture.     I reminded her about the importance of staying on her calcium and vitamin D supplements. She is no history of fractures.  She stays physically active.  Her gait is stable.    Gravelly  voice  She has no pain in her throat or neck, and I do not palpate any abnormalities.  I told her 1 possibilities of vocal cord polyps, although she does not strain her vocal cords.  She has never had a smoking habit.  I told her that if she wants to pursue evaluation, that would be an otolaryngology consultation, which would include fiberoptic laryngoscopy.  She will let me know if she wants to proceed with that.    Bilateral epiphora, takes doxycycline low-dose 50 mg once a day, monitored at [place], Status post bilateral intraocular implants.       Earwax, I suggested to use an over-the-counter wax dissolving eardrop kit, example brand Debrox or Murine     Mild alopecia and spider veins      Insomnia for which he uses every day Ambien, but I really want her to cease alcohol consumption since she uses Ambien.  She told me that alcohol consumption is 1 glass of wine per day.      Recurrent herpes simplex labialis (cold sores) takes Valtrex 500 mg tablet intermittently.      She recall satisfactory screening mammogram done either March or April 2022 near [facility]     Colon polyp, 5 mm in the transverse seen on colonoscopy November 23, 2020.  Recheck in 5 years would be entirely optional.  She told me that the prep was really unpleasant, to the point that it made her vomit.  I told her that I think she could probably stop doing screening colonoscopy, especially since she also had a negative Cologuard on November 6, 2020     Immunizations up-to-date    COVID-19 Vaccine Bivalent Booster 18+ (Moderna) 10/29/2022     Pneumo Conj 13-V (2010&after) 11/01/2016   Pneumococcal 23 valent 08/28/2020     Influenza (High Dose) 3 valent vaccine 09/28/2020, 10/26/2022     Zoster vaccine recombinant adjuvanted (SHINGRIX) 08/21/2018, 05/13/2019

## 2022-12-03 LAB
ALBUMIN SERPL BCG-MCNC: 4.4 G/DL (ref 3.5–5.2)
ALP SERPL-CCNC: 53 U/L (ref 35–104)
ALT SERPL W P-5'-P-CCNC: 17 U/L (ref 10–35)
ANION GAP SERPL CALCULATED.3IONS-SCNC: 14 MMOL/L (ref 7–15)
AST SERPL W P-5'-P-CCNC: 27 U/L (ref 10–35)
BILIRUB SERPL-MCNC: 0.3 MG/DL
BUN SERPL-MCNC: 23.2 MG/DL (ref 8–23)
CALCIUM SERPL-MCNC: 8.8 MG/DL (ref 8.8–10.2)
CHLORIDE SERPL-SCNC: 100 MMOL/L (ref 98–107)
CREAT SERPL-MCNC: 0.78 MG/DL (ref 0.51–0.95)
DEPRECATED HCO3 PLAS-SCNC: 22 MMOL/L (ref 22–29)
GFR SERPL CREATININE-BSD FRML MDRD: 78 ML/MIN/1.73M2
GLUCOSE SERPL-MCNC: 94 MG/DL (ref 70–99)
POTASSIUM SERPL-SCNC: 4.6 MMOL/L (ref 3.4–5.3)
PROT SERPL-MCNC: 7 G/DL (ref 6.4–8.3)
SODIUM SERPL-SCNC: 136 MMOL/L (ref 136–145)
TSH SERPL DL<=0.005 MIU/L-ACNC: 1.84 UIU/ML (ref 0.3–4.2)

## 2022-12-15 NOTE — TELEPHONE ENCOUNTER
Records Requested   December 15, 2022 2:56 PM  AYANG9 - received    Facility  [facility]  [address], [address]  [place]  [address]  Phone: [phone]  Fax:  [phone]   Outcome Sent a fax for images   22 3:28PM received images in PACS - Amay      Records Requested   December 15, 2022 2:57 PM  AYANG9 - received    Facility  [facility]   [address], [address], [place] [address]  Phone: [phone]  Fax: [phone]   Outcome Sent a fax for xray images  *trackin    22 11:36AM received imaging disc - Amay

## 2022-12-15 NOTE — TELEPHONE ENCOUNTER
DIAGNOSIS: SI joint Pain / Dr. Varun Fuentes, [place] / Ortho Consult / [employer]/xr/mri   APPOINTMENT DATE: 1/5/23   NOTES STATUS DETAILS   OFFICE NOTE from referring provider Received 10/21/22 OV Dr Fuentes    6/14/22 OV Dr Gee         OFFICE NOTE from other specialist Care Everywhere [facility]   5/16/22 OV Hernando Hughes MD  5/6/22 OV Davina Shabazz, PT    4/25/22 OV Sage North MD     MEDICATION LIST Internal    LABS     CBC/DIFF Internal 12/2/22   INJECTIONS DONE IN RADIOLOGY In process - received  [facility]   10/14/22, 10/3/22, 7/14/22     MRI In process - received  [facility]   6/16/22 MR Lumbar  Spine    XRAYS (IMAGES & REPORTS) In process - received  [facility]   4/25/22 XR HIPS

## 2022-12-20 NOTE — TELEPHONE ENCOUNTER
Action 12.20.22 MJ 1:05 PM   Action Taken Resolved one image into PACS from [facility].

## 2022-12-29 ASSESSMENT — ENCOUNTER SYMPTOMS
LOSS OF CONSCIOUSNESS: 0
MYALGIAS: 0
ARTHRALGIAS: 1
HEADACHES: 0
TREMORS: 0
TINGLING: 1
WEAKNESS: 0
NUMBNESS: 0
STIFFNESS: 1
DISTURBANCES IN COORDINATION: 0
BACK PAIN: 1
MEMORY LOSS: 0
NECK PAIN: 0
SEIZURES: 0
DIZZINESS: 0
SPEECH CHANGE: 0
JOINT SWELLING: 0

## 2022-12-31 DIAGNOSIS — G47.00 INSOMNIA, UNSPECIFIED TYPE: ICD-10-CM

## 2022-12-31 RX ORDER — ZOLPIDEM TARTRATE 10 MG/1
5 TABLET ORAL
Qty: 45 TABLET | Refills: 0 | Status: SHIPPED | OUTPATIENT
Start: 2022-12-31 | End: 2023-03-21

## 2023-01-02 DIAGNOSIS — M53.3 SACROILIAC JOINT PAIN: Primary | ICD-10-CM

## 2023-01-05 ENCOUNTER — ANCILLARY PROCEDURE (OUTPATIENT)
Dept: GENERAL RADIOLOGY | Facility: CLINIC | Age: 78
End: 2023-01-05
Attending: ORTHOPAEDIC SURGERY
Payer: MEDICARE

## 2023-01-05 ENCOUNTER — PRE VISIT (OUTPATIENT)
Dept: ORTHOPEDICS | Facility: CLINIC | Age: 78
End: 2023-01-05

## 2023-01-05 ENCOUNTER — OFFICE VISIT (OUTPATIENT)
Dept: ORTHOPEDICS | Facility: CLINIC | Age: 78
End: 2023-01-05
Payer: MEDICARE

## 2023-01-05 VITALS — BODY MASS INDEX: 17.42 KG/M2 | HEIGHT: 64 IN | WEIGHT: 102 LBS

## 2023-01-05 DIAGNOSIS — M53.3 SACROILIAC JOINT PAIN: ICD-10-CM

## 2023-01-05 DIAGNOSIS — G57.01 PIRIFORMIS SYNDROME, RIGHT: Primary | ICD-10-CM

## 2023-01-05 DIAGNOSIS — M53.3 SACROILIAC PAIN: ICD-10-CM

## 2023-01-05 PROCEDURE — 99204 OFFICE O/P NEW MOD 45 MIN: CPT | Mod: GC | Performed by: ORTHOPAEDIC SURGERY

## 2023-01-05 PROCEDURE — 72082 X-RAY EXAM ENTIRE SPI 2/3 VW: CPT | Performed by: STUDENT IN AN ORGANIZED HEALTH CARE EDUCATION/TRAINING PROGRAM

## 2023-01-05 PROCEDURE — 77073 BONE LENGTH STUDIES: CPT | Performed by: STUDENT IN AN ORGANIZED HEALTH CARE EDUCATION/TRAINING PROGRAM

## 2023-01-05 PROCEDURE — 72170 X-RAY EXAM OF PELVIS: CPT | Performed by: RADIOLOGY

## 2023-01-05 NOTE — NURSING NOTE
"Reason For Visit:   Chief Complaint   Patient presents with     Consult     SI joint Pain / Dr. Varun Fuentes, [place]        Primary MD: Andrea Cruz  Ref. MD: Dr. Fuentes    ?  No  Occupation retired.    Date of injury: No  Type of injury: No.    Date of surgery: No  Type of surgery: No.    Smoker: No  Request smoking cessation information: No    Ht 1.63 m (5' 4.17\")   Wt 46.3 kg (102 lb)   BMI 17.41 kg/m      Pain Assessment  Patient Currently in Pain: Yes  0-10 Pain Scale: 1  Primary Pain Location: Back    Oswestry (NICOLE) Questionnaire    OSWESTRY DISABILITY INDEX 12/29/2022   Count 8   Sum 17   Oswestry Score (%) 42.5        Visual Analog Pain Scale  Back Pain Scale 0-10: 1  Right leg pain: 0 (burning and tingling)  Left leg pain: 0  Neck Pain Scale 0-10: 0  Right arm pain: 0  Left arm pain: 0    Promis 10 Assessment    PROMIS 10 12/29/2022   In general, would you say your health is: Excellent   In general, would you say your quality of life is: Fair   In general, how would you rate your physical health? Fair   In general, how would you rate your mental health, including your mood and your ability to think? Excellent   In general, how would you rate your satisfaction with your social activities and relationships? Very good   In general, please rate how well you carry out your usual social activities and roles Good   To what extent are you able to carry out your everyday physical activities such as walking, climbing stairs, carrying groceries, or moving a chair? Moderately   How often have you been bothered by emotional problems such as feeling anxious, depressed or irritable? Never   How would you rate your fatigue on average? Moderate   How would you rate your pain on average?   0 = No Pain  to  10 = Worst Imaginable Pain 7   In general, would you say your health is: 5   In general, would you say your quality of life is: 2   In general, how would you rate your physical health? 2   In " general, how would you rate your mental health, including your mood and your ability to think? 5   In general, how would you rate your satisfaction with your social activities and relationships? 4   In general, please rate how well you carry out your usual social activities and roles. (This includes activities at home, at work and in your community, and responsibilities as a parent, child, spouse, employee, friend, etc.) 3   To what extent are you able to carry out your everyday physical activities such as walking, climbing stairs, carrying groceries, or moving a chair? 3   In the past 7 days, how often have you been bothered by emotional problems such as feeling anxious, depressed, or irritable? 1   In the past 7 days, how would you rate your fatigue on average? 3   In the past 7 days, how would you rate your pain on average, where 0 means no pain, and 10 means worst imaginable pain? 7   Global Mental Health Score 16   Global Physical Health Score 10   PROMIS TOTAL - SUBSCORES 26   Some recent data might be hidden                Bisi Martin LPN

## 2023-01-05 NOTE — PROGRESS NOTES
Spine Surgery Consultation    REFERRING PHYSICIAN: Varun Fuentes   PRIMARY CARE PHYSICIAN: Andrea Cruz           Chief Complaint:   Consult (SI joint Pain / Dr. Varun Fuentes, [place] )      History of Present Illness:  Symptom Profile Including: location of symptoms, onset, severity, exacerbating/alleviating factors, previous treatments:        Alma Michele is a 77 year old female who presents as pain at the right buttocks.  She reports she is experienced symptoms for around a year.  This pain is in the deep glued and travels down the leg sometimes around the knee.  She does not have low back pain associated with this.  It is most often aggravated by activity and is calmed with rest.  So far she has tried physical therapy, chiropractics, and has had 1 lumbar spine injection without any relief and when SI joint injection with potential relief.    She is accompanied by her daughter.         Past Medical History:   No past medical history on file.         Past Surgical History:   No past surgical history on file.         Social History:     Social History     Tobacco Use     Smoking status: Former     Years: 4.00     Types: Cigarettes     Smokeless tobacco: Never   Substance Use Topics     Alcohol use: Not on file            Family History:     Family History   Problem Relation Age of Onset     Heart Failure Father      Colon Polyps Sister      No Known Problems Brother      No Known Problems Maternal Grandmother      Cerebrovascular Disease Maternal Grandfather             Allergies:   No Known Allergies         Medications:     Current Outpatient Medications   Medication     alendronate (FOSAMAX) 70 MG tablet     amLODIPine (NORVASC) 2.5 MG tablet     calcium carbonate (OS-TIMOTHY) 600 mg calcium (1,500 mg) tablet     cholecalciferol, vitamin D3, (VITAMIN D3) 2,000 unit cap     gabapentin (NEURONTIN) 300 MG capsule     ibuprofen (ADVIL,MOTRIN) 200 MG tablet     ELIZABETH 128 5 % ophthalmic ointment      "valACYclovir (VALTREX) 500 MG tablet     zolpidem (AMBIEN) 10 MG tablet     No current facility-administered medications for this visit.             Review of Systems:     A 10 point ROS was performed and reviewed. Specific responses to these questions are noted at the end of the document.         Physical Exam:   Vitals: Ht 1.63 m (5' 4.17\")   Wt 46.3 kg (102 lb)   BMI 17.41 kg/m    Constitutional: awake, alert, cooperative, no apparent distress, appears stated age.    Eyes: The sclera are white.  Ears, Nose, Throat: The trachea is midline.  Psychiatric: The patient has a normal affect.  Respiratory: breathing non-labored  Cardiovascular: The extremities are warm and perfused.  Skin: no obvious rashes or lesions.  Musculoskeletal, Neurologic, and Spine:     Patient is tender to palpation over the piriformis origin traveling down to the posterior greater trochanter.  She is nontender to palpation over the right PSIS       Lumbar Spine:    Appearance: No gross stepoffs or deformities    Motor:   Right Left    L2-L3: Hip Flexion 5/5 5/5    L3-L4: Knee Extension 5/5 5/5    L4-L5: Foot Dorsiflexion 5/5 5/5          EHL Dorsiflexion 4/5 4/5   S1: Foot Plantarflexion 5/5 5/5          Peroneal Muscles 5/5 5/5         Sensation: Intact to light touch in L2-S1 nerve distribution.  Hip Exam: No pain with hip log roll and no tenderness over the greater trochanters.  Alignment: Patient stands with a neutral standing sagittal balance.  SIJ Joint: Negative pelvis compression and expansion, negative Neal finger, positive Esther, negative thigh thrust  Positive piriformis stress test           Imaging:   We ordered and independently reviewed new radiographs at this clinic visit. The results were discussed with the patient.  Findings include:    Pelvis without evidence of acute injury or fracture.  She does have several levels of stenosis on MRI most notably at L4-5 and L5-S1.                   Assessment and Plan: "   Assessment:  77 year old female with piriformis syndrome. Right.  Discussed with patient this diagnosis and, treatment options.  Also discussed that her symptom profile is not consistent with SI joint pathology.     Plan:  1. Physical therapy for piriformis stretching  2. If incomplete resolution or unsatisfactory resolution with physical therapy patient should call back to obtain referral for piriformis block      Yamileth Jiménez MD  Orthopedic Surgery PGY4    I saw and evaluated the patient and developed the plan. She does not have the physical exam findings consistent with patients who should proceed to surgical treatment of the SI joint.  I took her on google to show her images of the piriformis and a piriformis stretch. I showed her a physical model of the pelvis and explained the anatomy to her. We talked about the plan starting with stretches, progressing to a piriformis block if needed and possibly botox to the piriformis if particularly recalcitrant. I explained how she could go about connecting in [place] where she is headed for now.    Sage Pickett MD          Answers for HPI/ROS submitted by the patient on 12/29/2022  General Symptoms: No  Skin Symptoms: No  HENT Symptoms: No  EYE SYMPTOMS: No  HEART SYMPTOMS: No  LUNG SYMPTOMS: No  INTESTINAL SYMPTOMS: No  URINARY SYMPTOMS: No  GYNECOLOGIC SYMPTOMS: No  BREAST SYMPTOMS: No  SKELETAL SYMPTOMS: Yes  BLOOD SYMPTOMS: No  NERVOUS SYSTEM SYMPTOMS: Yes  MENTAL HEALTH SYMPTOMS: No  Back pain: Yes  Muscle aches: No  Neck pain: No  Swollen joints: No  Joint pain: Yes  Joint stiffness: Yes  Trouble with coordination: No  Dizziness or trouble with balance: No  Fainting or black-out spells: No  Memory loss: No  Headache: No  Seizures: No  Speech problems: No  Tingling: Yes  Tremor: No  Weakness: No  Difficulty walking: No  Numbness: No

## 2023-01-05 NOTE — LETTER
1/5/2023         RE: Alma Michele  [address] [place] [address]        Dear Colleague,    Thank you for referring your patient, Alma Michele, to the [facility]. Please see a copy of my visit note below.    Spine Surgery Consultation    REFERRING PHYSICIAN: Varun Fuentes   PRIMARY CARE PHYSICIAN: Andrea Cruz           Chief Complaint:   Consult (SI joint Pain / Dr. Varun Fuentes, [place] )      History of Present Illness:  Symptom Profile Including: location of symptoms, onset, severity, exacerbating/alleviating factors, previous treatments:        Alma Michele is a 77 year old female who presents as pain at the right buttocks.  She reports she is experienced symptoms for around a year.  This pain is in the deep glued and travels down the leg sometimes around the knee.  She does not have low back pain associated with this.  It is most often aggravated by activity and is calmed with rest.  So far she has tried physical therapy, chiropractics, and has had 1 lumbar spine injection without any relief and when SI joint injection with potential relief.    She is accompanied by her daughter.         Past Medical History:   No past medical history on file.         Past Surgical History:   No past surgical history on file.         Social History:     Social History     Tobacco Use     Smoking status: Former     Years: 4.00     Types: Cigarettes     Smokeless tobacco: Never   Substance Use Topics     Alcohol use: Not on file            Family History:     Family History   Problem Relation Age of Onset     Heart Failure Father      Colon Polyps Sister      No Known Problems Brother      No Known Problems Maternal Grandmother      Cerebrovascular Disease Maternal Grandfather             Allergies:   No Known Allergies         Medications:     Current Outpatient Medications   Medication     alendronate (FOSAMAX) 70 MG tablet     amLODIPine (NORVASC) 2.5 MG  "tablet     calcium carbonate (OS-TIMOTHY) 600 mg calcium (1,500 mg) tablet     cholecalciferol, vitamin D3, (VITAMIN D3) 2,000 unit cap     gabapentin (NEURONTIN) 300 MG capsule     ibuprofen (ADVIL,MOTRIN) 200 MG tablet     ELIZABETH 128 5 % ophthalmic ointment     valACYclovir (VALTREX) 500 MG tablet     zolpidem (AMBIEN) 10 MG tablet     No current facility-administered medications for this visit.             Review of Systems:     A 10 point ROS was performed and reviewed. Specific responses to these questions are noted at the end of the document.         Physical Exam:   Vitals: Ht 1.63 m (5' 4.17\")   Wt 46.3 kg (102 lb)   BMI 17.41 kg/m    Constitutional: awake, alert, cooperative, no apparent distress, appears stated age.    Eyes: The sclera are white.  Ears, Nose, Throat: The trachea is midline.  Psychiatric: The patient has a normal affect.  Respiratory: breathing non-labored  Cardiovascular: The extremities are warm and perfused.  Skin: no obvious rashes or lesions.  Musculoskeletal, Neurologic, and Spine:     Patient is tender to palpation over the piriformis origin traveling down to the posterior greater trochanter.  She is nontender to palpation over the right PSIS       Lumbar Spine:    Appearance: No gross stepoffs or deformities    Motor:   Right Left    L2-L3: Hip Flexion 5/5 5/5    L3-L4: Knee Extension 5/5 5/5    L4-L5: Foot Dorsiflexion 5/5 5/5          EHL Dorsiflexion 4/5 4/5   S1: Foot Plantarflexion 5/5 5/5          Peroneal Muscles 5/5 5/5         Sensation: Intact to light touch in L2-S1 nerve distribution.  Hip Exam: No pain with hip log roll and no tenderness over the greater trochanters.  Alignment: Patient stands with a neutral standing sagittal balance.  SIJ Joint: Negative pelvis compression and expansion, negative Neal finger, positive Esther, negative thigh thrust  Positive piriformis stress test           Imaging:   We ordered and independently reviewed new radiographs at this clinic " visit. The results were discussed with the patient.  Findings include:    Pelvis without evidence of acute injury or fracture.  She does have several levels of stenosis on MRI most notably at L4-5 and L5-S1.                   Assessment and Plan:   Assessment:  77 year old female with piriformis syndrome. Right.  Discussed with patient this diagnosis and, treatment options.  Also discussed that her symptom profile is not consistent with SI joint pathology.     Plan:  1. Physical therapy for piriformis stretching  2. If incomplete resolution or unsatisfactory resolution with physical therapy patient should call back to obtain referral for piriformis block      Yamileth Jiménez MD  Orthopedic Surgery PGY4    I saw and evaluated the patient and developed the plan. She does not have the physical exam findings consistent with patients who should proceed to surgical treatment of the SI joint.  I took her on google to show her images of the piriformis and a piriformis stretch. I showed her a physical model of the pelvis and explained the anatomy to her. We talked about the plan starting with stretches, progressing to a piriformis block if needed and possibly botox to the piriformis if particularly recalcitrant. I explained how she could go about connecting in [place] where she is headed for now.    Sage Pickett MD          Answers for HPI/ROS submitted by the patient on 12/29/2022  General Symptoms: No  Skin Symptoms: No  HENT Symptoms: No  EYE SYMPTOMS: No  HEART SYMPTOMS: No  LUNG SYMPTOMS: No  INTESTINAL SYMPTOMS: No  URINARY SYMPTOMS: No  GYNECOLOGIC SYMPTOMS: No  BREAST SYMPTOMS: No  SKELETAL SYMPTOMS: Yes  BLOOD SYMPTOMS: No  NERVOUS SYSTEM SYMPTOMS: Yes  MENTAL HEALTH SYMPTOMS: No  Back pain: Yes  Muscle aches: No  Neck pain: No  Swollen joints: No  Joint pain: Yes  Joint stiffness: Yes  Trouble with coordination: No  Dizziness or trouble with balance: No  Fainting or black-out spells: No  Memory loss:  No  Headache: No  Seizures: No  Speech problems: No  Tingling: Yes  Tremor: No  Weakness: No  Difficulty walking: No  Numbness: No

## 2023-02-06 ENCOUNTER — TELEPHONE (OUTPATIENT)
Dept: ORTHOPEDICS | Facility: CLINIC | Age: 78
End: 2023-02-06
Payer: MEDICARE

## 2023-02-06 DIAGNOSIS — G57.01 PIRIFORMIS SYNDROME, RIGHT: Primary | ICD-10-CM

## 2023-02-06 NOTE — TELEPHONE ENCOUNTER
[facility] Call Center    Phone Message    May a detailed message be left on voicemail: yes     Reason for Call: Other: Pt seen dr. zapata on 01/05/2023.  Pt asking about getting piriformisj injection with Dr. Zapata. Pt was also prescribed medication.     Pt can be reached at [phone] - pt has questions and would like call back       Action Taken: Other: Dr. Sage Zapata    Travel Screening: Not Applicable

## 2023-02-06 NOTE — TELEPHONE ENCOUNTER
See phone message from pt & see dictation for plan.    I called pt back.  She tried to find a provider in [place] Who does Piriformis injections, but could not.  States she saw another [facility] DR. In [place]. Who told her he did not think it is her Piriformis & does not do injections & told her he thought it was her Spine & ordered Medrol dose julia.  She stated she wants to go with  What  ordered & will come back to [place]. For injection.    She also stated he daughter is a Nurse & advised her not to take the Medrol steroid until she checks with us because it might interfere with when she could have an injection.  I told her the daughter is correct , they would not want steroid before injection.    States she did do the PT in [place] that we ordered & states maybe the stretches helped slightly but not enough.    I placed Spine referral order for Right Piriformis injection & told her PMR scheduling dept will call her & if she doesn't hear from them with in 1 week, to call [phone] & pt agreed.   I again reviewed dictation that  stated if she gets relief from Piriformis injection then she can F/U with PMR for possible Botox injections to Piriformis.    I placed a second spine referral order in case she needs F/U appt with PMR.  Call back prn.  Pt agreed.  W.ALVIN./Dipika Allan RN.

## 2023-02-07 DIAGNOSIS — M79.18 PIRIFORMIS MUSCLE PAIN: Primary | ICD-10-CM

## 2023-02-07 DIAGNOSIS — M62.838 MUSCLE SPASM: ICD-10-CM

## 2023-02-07 NOTE — PROGRESS NOTES
Per orthopedic spine surgery request, case request placed for ultrasound-guided right piriformis muscle corticosteroid injection

## 2023-02-08 ENCOUNTER — TELEPHONE (OUTPATIENT)
Dept: PHYSICAL MEDICINE AND REHAB | Facility: CLINIC | Age: 78
End: 2023-02-08
Payer: MEDICARE

## 2023-02-08 PROBLEM — M79.18 PIRIFORMIS MUSCLE PAIN: Status: ACTIVE | Noted: 2023-02-08

## 2023-02-08 PROBLEM — M62.838 MUSCLE SPASM: Status: ACTIVE | Noted: 2023-02-08

## 2023-02-08 NOTE — TELEPHONE ENCOUNTER
Called patient to schedule surgery with    Date of Surgery: 02/20  Approximate arrival time given:  Yes  Location of surgery: [facility]  Discussed COVID-19 Testing: Not Applicable     Patient aware that pre-op RN will call 2-3 days prior to surgery with arrival time and instructions Yes    Pt aware  needed           David Johnson on 2/8/2023 at 2:55 PM

## 2023-02-10 ENCOUNTER — TELEPHONE (OUTPATIENT)
Dept: ORTHOPEDICS | Facility: CLINIC | Age: 78
End: 2023-02-10
Payer: MEDICARE

## 2023-02-10 NOTE — TELEPHONE ENCOUNTER
[facility] Call Center    Phone Message    May a detailed message be left on voicemail: no     Reason for Call: Other: Requesting c/b- would like clarification on the 2 spine referrals. Would like c/b ASAP because she is scheduled w/Dr Renee on 02/20 and wants to make sure that is correct so she doesn't have to change her plane tickets    Action Taken: Message routed to:  [facility]    Travel Screening: Not Applicable

## 2023-02-10 NOTE — TELEPHONE ENCOUNTER
Writer called and confirmed that pt is scheduled with Dr. Renee on 2/20/23. Writer does not see any messages regarding a referral needed and is unsure why pt received that message. Pt thanked writer for a call back.     Bisi Martin LPN

## 2023-02-11 ENCOUNTER — HEALTH MAINTENANCE LETTER (OUTPATIENT)
Age: 78
End: 2023-02-11

## 2023-02-14 ENCOUNTER — TELEPHONE (OUTPATIENT)
Dept: ORTHOPEDICS | Facility: CLINIC | Age: 78
End: 2023-02-14
Payer: MEDICARE

## 2023-02-14 NOTE — TELEPHONE ENCOUNTER
Health Call Center    Phone Message    May a detailed message be left on voicemail: yes     Reason for Call: Other: Alma called she received a message she said from Dr. Pickett about needing a referral which she is confused about. She is scheduled for an injection on 2/20/23. Does she need a referral? What was the phonecall for. Please call and discuss with patient      Action Taken: Other: [facility]    Travel Screening: Not Applicable

## 2023-02-14 NOTE — TELEPHONE ENCOUNTER
Writer called and left a voice message for pt. Writer stated that Dr. Pickett and Dr. Renee's teams are uncertain of who called about referral as neither teams called pt. Pt is scheduled for injection and should plan for that appointment. If pt has further questions they can call clinic back [phone].     Bisi Martin LPN

## 2023-02-20 ENCOUNTER — TELEPHONE (OUTPATIENT)
Dept: PHYSICAL MEDICINE AND REHAB | Facility: CLINIC | Age: 78
End: 2023-02-20

## 2023-02-20 ENCOUNTER — ANCILLARY PROCEDURE (OUTPATIENT)
Dept: RADIOLOGY | Facility: AMBULATORY SURGERY CENTER | Age: 78
End: 2023-02-20
Attending: PHYSICAL MEDICINE & REHABILITATION
Payer: MEDICARE

## 2023-02-20 ENCOUNTER — HOSPITAL ENCOUNTER (OUTPATIENT)
Facility: AMBULATORY SURGERY CENTER | Age: 78
Discharge: HOME | End: 2023-02-20
Attending: PHYSICAL MEDICINE & REHABILITATION | Admitting: PHYSICAL MEDICINE & REHABILITATION
Payer: MEDICARE

## 2023-02-20 VITALS
RESPIRATION RATE: 16 BRPM | DIASTOLIC BLOOD PRESSURE: 83 MMHG | HEART RATE: 73 BPM | BODY MASS INDEX: 17.42 KG/M2 | SYSTOLIC BLOOD PRESSURE: 139 MMHG | HEIGHT: 64 IN | TEMPERATURE: 96.6 F | OXYGEN SATURATION: 98 % | WEIGHT: 102 LBS

## 2023-02-20 DIAGNOSIS — M62.838 MUSCLE SPASM: ICD-10-CM

## 2023-02-20 DIAGNOSIS — M79.18 PIRIFORMIS MUSCLE PAIN: ICD-10-CM

## 2023-02-20 DIAGNOSIS — R52 PAIN: ICD-10-CM

## 2023-02-20 PROCEDURE — 20552 NJX 1/MLT TRIGGER POINT 1/2: CPT

## 2023-02-20 PROCEDURE — 20552 NJX 1/MLT TRIGGER POINT 1/2: CPT | Performed by: PHYSICAL MEDICINE & REHABILITATION

## 2023-02-20 PROCEDURE — 76942 ECHO GUIDE FOR BIOPSY: CPT | Mod: 26 | Performed by: PHYSICAL MEDICINE & REHABILITATION

## 2023-02-20 RX ORDER — TRIAMCINOLONE ACETONIDE 40 MG/ML
INJECTION, SUSPENSION INTRA-ARTICULAR; INTRAMUSCULAR DAILY PRN
Status: DISCONTINUED | OUTPATIENT
Start: 2023-02-20 | End: 2023-02-20 | Stop reason: HOSPADM

## 2023-02-20 RX ORDER — LIDOCAINE HYDROCHLORIDE 10 MG/ML
INJECTION, SOLUTION EPIDURAL; INFILTRATION; INTRACAUDAL; PERINEURAL DAILY PRN
Status: DISCONTINUED | OUTPATIENT
Start: 2023-02-20 | End: 2023-02-20 | Stop reason: HOSPADM

## 2023-02-20 NOTE — OP NOTE
PROCEDURE NOTE: Piriformis Muscle Injection Under Ultrasound Guidance    PROCEDURE DATE: 2/20/2023    PATIENT NAME: Alma Michele  YOB: 1945    ATTENDING PHYSICIAN: Gary Renee MD  FELLOW/RESIDENT PHYSICIAN: None     PREOPERATIVE DIAGNOSIS:   1. Piriformis muscle pain    2. Muscle spasm       POSTOPERATIVE DIAGNOSIS: same    PROCEDURE PERFORMED: Right Piriformis Muscle Injection Under Ultrasound Guidance    ULTRASOUND WAS USED.    INDICATIONS FOR THE PROCEDURE:  Alma Michele is a 77 year old female who presents with piriformis syndrome and muscle pain/spasm.     PROCEDURE AND FINDINGS:  She was greeted in the pre-procedure bay. The risk, benefits and alternatives to the procedure were again reviewed with her and informed consent was obtained and the patient agreed to proceed. A time-out was performed. Following review alternatives, benefits and risks, the procedure was carried out under sterile prep with sterile gel. The use of direct sonographic guidance was used to ensure accurate placement of the needle (rather than non-guided injection) and required to minimize the risk of bleeding or injury to nearby neurovascular structures.     A 5-1MHz ultrasound transducer was used to visualize the relevant structures and determine the optimal needle path for the procedure.  2mL 1% lidocaine was infiltrated at the needle insertion site subcutaneously. Then, a 22 gauge 5-inch Quincke spinal needle was advanced from lateral to medial utilizing an in-plane approach in long axis, under continuous ultrasound guidance to the piriformis muscle on the right. After negative aspiration, slow injection of the treatment solution 3mL total consisting of 1mL Kenalog (40mg/mL) and 2mL of 1% Lidocaine was instilled into affected area. The tip of the needle was visualized throughout the procedure. The remainder of the single-use vials were discarded.      Before the procedure, she reported a pain score of 8/10. After the  procedure, she reported a pain score of 7/10.      She tolerated the procedure well, was discharged home in stable condition.     Follow-up will be in clinic     COMPLICATIONS: None    COMMENTS: None

## 2023-02-20 NOTE — TELEPHONE ENCOUNTER
Spoke with patient to schedule her post-procedure follow up ~May 2023 when patient returns from [place].   However pt said she will hold off until she knows for sure when she will return from [place].

## 2023-02-20 NOTE — DISCHARGE INSTRUCTIONS
Home Care Instructions after a Piriformis Muscle Injection  The piriformis muscle is a small muscle located deep in the buttock (behind the gluteus adriana).     Activity  -You may resume most normal activity levels with the exception of strenuous activity. It is important for us to know if your pain with normal activity is relieved after this injection.  -DO NOT shower for 24 hours  -DO NOT remove bandaid for 24 hours    Pain  -You may experience soreness at the injection site for one or two days  -You may use an ice pack for 20 minutes every 2 hours for the first 24 hours  -You may use a heating pad after the first 24 hours  -You may use Tylenol (acetaminophen) every 4 hours or other pain medicines as     directed by your physician    You may experience numbness radiating into your legs or arms (depending on the procedure location). This numbness may last several hours. Until sensation returns to normal; please use caution in walking, climbing stairs, and stepping out of your vehicle, etc.    DID YOU RECEIVE STEROIDS TODAY?  Yes    Common side effects of steroids:  Not everyone will experience corticosteroid side effects. If side effects are experienced, they will gradually subside in the 7-10 day period following an injection. Most common side effects include:  -Flushed face and/or chest  -Feeling of warmth, particularly in the face but could be an overall feeling of warmth  -Increased blood sugar in diabetic patients  -Menstrual irregularities my occur. If taking hormone-based birth control an alternate method of birth control is recommended  -Sleep disturbances and/or mood swings are possible  -Leg cramps      PLEASE KEEP TRACK OF YOUR SYMPTOMS AND NOTE YOUR IMPROVEMENT FOR YOUR DOCTOR.     Please contact us if you have:  -Severe pain  -Fever more than 101.5 degrees Fahrenheit  -Signs of infection at the injection site (redness, swelling, or drainage)    FOR PM&R PATIENTS:  For patients seen by the PM and R  service, please call [phone]. (Monday through Friday 8a-5p.  After business hours and weekends call [phone] and ask for the PM and R doctor on call). If you need to fax a pain diary to PM&R the fax number is [phone]. If you are unable to fax, uploading to [employer] is encouraged, then send to provider. If you have procedure scheduling questions please call [phone] Option #2.

## 2023-03-21 ENCOUNTER — MYC REFILL (OUTPATIENT)
Dept: INTERNAL MEDICINE | Facility: CLINIC | Age: 78
End: 2023-03-21
Payer: MEDICARE

## 2023-03-21 DIAGNOSIS — G47.00 INSOMNIA, UNSPECIFIED TYPE: ICD-10-CM

## 2023-03-22 RX ORDER — ZOLPIDEM TARTRATE 10 MG/1
5 TABLET ORAL
Qty: 45 TABLET | Refills: 0 | Status: SHIPPED | OUTPATIENT
Start: 2023-03-22 | End: 2023-09-26

## 2023-03-22 NOTE — TELEPHONE ENCOUNTER
03/22/23  Pt calling and wants her Zolpidem sent to different pharmacy because she will be in [place] until May. Pt wants this sent to [facility] in [place]:  [id number] [facility] [id number]  Hui

## 2023-04-21 ENCOUNTER — TELEPHONE (OUTPATIENT)
Dept: ORTHOPEDICS | Facility: CLINIC | Age: 78
End: 2023-04-21
Payer: MEDICARE

## 2023-04-21 NOTE — TELEPHONE ENCOUNTER
See phone message from pt.    I called her back & reminded her that she saw  in [facility] & had an injection done 2-20-23 so she needs to call [phone] to inquire if she qualifies for Botox injection.  Call back prn. Pt agreed.  Dipika Allan RN.

## 2023-04-21 NOTE — TELEPHONE ENCOUNTER
[facility] Call Center    Phone Message    May a detailed message be left on voicemail: yes     Reason for Call: Other: Alma called she wants to get scheduled for botox injection. She is unsure who does them please call and discuss with patient and schedule if possible.     Action Taken: Other: [facility]    Travel Screening: Not Applicable

## 2023-05-02 ENCOUNTER — OFFICE VISIT (OUTPATIENT)
Dept: INTERNAL MEDICINE | Facility: CLINIC | Age: 78
End: 2023-05-02
Payer: MEDICARE

## 2023-05-02 VITALS
HEART RATE: 70 BPM | HEIGHT: 64 IN | WEIGHT: 100 LBS | SYSTOLIC BLOOD PRESSURE: 130 MMHG | TEMPERATURE: 98.3 F | DIASTOLIC BLOOD PRESSURE: 70 MMHG | RESPIRATION RATE: 16 BRPM | BODY MASS INDEX: 17.07 KG/M2 | OXYGEN SATURATION: 99 %

## 2023-05-02 DIAGNOSIS — H61.23 BILATERAL IMPACTED CERUMEN: Primary | ICD-10-CM

## 2023-05-02 DIAGNOSIS — I10 ESSENTIAL HYPERTENSION, BENIGN: ICD-10-CM

## 2023-05-02 DIAGNOSIS — M81.0 AGE-RELATED OSTEOPOROSIS WITHOUT CURRENT PATHOLOGICAL FRACTURE: ICD-10-CM

## 2023-05-02 PROCEDURE — 99213 OFFICE O/P EST LOW 20 MIN: CPT | Performed by: INTERNAL MEDICINE

## 2023-05-02 NOTE — PROGRESS NOTES
Office Visit - Follow Up   Alma Michele   78 year old female    Date of Visit: 5/2/2023    Chief Complaint   Patient presents with     Hypertension        -------------------------------------------------------------------------------------------------------------------------  Assessment and Plan    Follow-up multiple issues, Phong doing well, and enjoyed the winter in [facility].  She just got back to [place] last weekend of April 30, 2023.    The main thing we discussed today is blood pressure    Essential hypertension, with blood pressure that I think is overall well controlled, she might be able to reduce the amlodipine dose to 2.5 mg once a day instead of twice a day    May 2, 2023, she showed me home blood pressure readings which vary from a low of about 80/60, up to a high of around 140 over mid 80s.    She to me that loading occurred when she was getting out of a hot shower, and that probably reflects vasodilation.  She should probably avoid bathing and overly hot water.  Also important stay well-hydrated, especially during these warmer summer months.    I suggested she try an experiment of reducing the amlodipine to 2.5 mg ONCE A DAY, and see how her blood pressure behaves over a couple of weeks.  A reasonable blood pressure target would be 130/80 measured at rest in the seated position on the right upper arm.    I think her blood pressure probably is well controlled    One of her daughters is a nurse.  Then she can use her automatic machine to collect data.  I would like to see her resting systolic pressure below 130, diastolic 85 or less.    BP Readings from Last 6 Encounters:   05/02/23 130/70   02/20/23 139/83   12/02/22 110/70   12/14/21 (!) 142/90   09/21/21 110/78   05/26/21 128/78     Bilateral earwax, despite her using over-the-counter peroxide eardrops, will send her to ENT for manual clear out, and once that is done, I want her to use wax dissolving eardrops every week, maybe even  twice a week to keep the ears clear.    Successful retina surgery December 6, 2022 for macular pucker  She will be departing for the [facility] after New Year's Day     Underweight, body mass index around 18.   I would like to see her adult a few pounds of healthy lean muscle mass.     Wt Readings from Last 5 Encounters:   05/02/23 45.4 kg (100 lb)   02/20/23 46.3 kg (102 lb)   01/05/23 46.3 kg (102 lb)   12/02/22 47.6 kg (105 lb)   12/14/21 46.7 kg (103 lb)     Spinal canal stenosis  Right L5 transforaminal epidural steroid injection July 14, 2022  Lumbar spine MRI June 16, 2022  Moderate lumbar spondylosis with broad-based disc herniations evident throughout the lumbar spine.  Disc bulging and facet degenerative changes resulting in severe spinal canal stenosis and lateral recess narrowing at L4-5 and moderate to severe spinal canal stenosis at L3-4.     Takes gabapentin 600 mg at bedtime (2 capsules of 300 mg each)     Right side piriformis syndrome.  After she had consultation with Dr. Sage Pickett at the , she was given a diagnosis of piriformis syndrome, and got an injection done at the  in January 2023 which was helpful  And since then has been doing physical therapy twice a week    But still bothered by pain with ambulation, so she is going to meet with [facility] to consider Botox injection to the chronically contracted muscles    Underwent sacroiliac joint injection on the right side October 14, 2022    Osteoporosis for which she has been on alendronate for approximately 4 years, stopping in 2019, restarted June 2021  Now that it is May 2023, I suggest checking a bone density scan and then take another alendronate drug holiday for a few years    She is doing fine after restart of alendronate, no problems with swallowing.  She is taking it properly on an empty stomach, first thing the morning with a small glass of water, and staying upright for at least an hour    [facility] had recommended her to continue alendronate through 2020 to complete 5 years.  However she decided to stop it in 2019.  DEXA scan was May 2019 done at [facility] with a femoral neck T score of -2.5, lumbar spine T score -2.2.     Fariba 3 2021  1. The spine bone density L1-L2 with T-score -2.4.  2. Femoral bone densities show left total hip T- score -2.9 and right total hip T-score -2.7.  3. Trabecular bone score indicates poor trabecular bone architecture.     I reminded her about the importance of staying on her calcium and vitamin D supplements. She is no history of fractures.  She stays physically active.  Her gait is stable.     Gravelly voice  She has no pain in her throat or neck, and I do not palpate any abnormalities.  I told her 1 possibilities of vocal cord polyps, although she does not strain her vocal cords.  She has never had a smoking habit.  I told her that if she wants to pursue evaluation, that would be an otolaryngology consultation, which would include fiberoptic laryngoscopy.  She will let me know if she wants to proceed with that.     Bilateral epiphora, takes doxycycline low-dose 50 mg once a day, monitored at [place] [facility], Status post bilateral intraocular implants.       Earwax, I suggested to use an over-the-counter wax dissolving eardrop kit, example brand Debrox or Murine     Mild alopecia and spider veins      Insomnia for which he uses every day Ambien, but I really want her to cease alcohol consumption since she uses Ambien.  She told me that alcohol consumption is 1 glass of wine per day.      Recurrent herpes simplex labialis (cold sores) takes Valtrex 500 mg tablet intermittently.       She recall satisfactory screening mammogram done either March or April 2022 near [facility]     Colon polyp, 5 mm in the transverse seen on colonoscopy November 23, 2020.  Recheck in 5 years would be entirely optional.  She told me that the prep was really  unpleasant, to the point that it made her vomit.  I told her that I think she could probably stop doing screening colonoscopy, especially since she also had a negative Cologuard on November 6, 2020     Immunizations up-to-date     COVID-19 Vaccine Bivalent Booster 18+ (Moderna) 10/29/2022      Pneumo Conj 13-V (2010&after) 11/01/2016   Pneumococcal 23 valent 08/28/2020      Influenza (High Dose) 3 valent vaccine 09/28/2020, 10/26/2022      Zoster vaccine recombinant adjuvanted (SHINGRIX) 08/21/2018, 05/13/2019           --------------------------------------------------------------------------------------------------------------------------  History of Present Illness  This 78 year old old     Reason for visit:  Blood pressure varying from low to high     She eats 4 or more servings of fruits and vegetables daily.She consumes 0 sweetened beverage(s) daily.She exercises with enough effort to increase her heart rate 9 or less minutes per day.  She exercises with enough effort to increase her heart rate 3 or less days per week.   She is taking medications regularly.     Follow-up multiple issues, Phong doing well, and enjoyed the winter in [facility].  She just got back to [place] last weekend of April 30, 2023.    The main thing we discussed today is blood pressure    Essential hypertension, with blood pressure that I think is overall well controlled, she might be able to reduce the amlodipine dose to 2.5 mg once a day instead of twice a day    May 2, 2023, she showed me home blood pressure readings which vary from a low of about 80/60, up to a high of around 140 over mid 80s.    She to me that loading occurred when she was getting out of a hot shower, and that probably reflects vasodilation.  She should probably avoid bathing and overly hot water.  Also important stay well-hydrated, especially during these warmer summer months.    I suggested she try an experiment of reducing the amlodipine to 2.5 mg  ONCE A DAY, and see how her blood pressure behaves over a couple of weeks.  A reasonable blood pressure target would be 130/80 measured at rest in the seated position on the right upper arm.    I think her blood pressure probably is well controlled    One of her daughters is a nurse.  Then she can use her automatic machine to collect data.  I would like to see her resting systolic pressure below 130, diastolic 85 or less.    BP Readings from Last 6 Encounters:   05/02/23 130/70   02/20/23 139/83   12/02/22 110/70   12/14/21 (!) 142/90   09/21/21 110/78   05/26/21 128/78     Bilateral earwax, despite her using over-the-counter peroxide eardrops, will send her to ENT for manual clear out, and once that is done, I want her to use wax dissolving eardrops every week, maybe even twice a week to keep the ears clear.      ---------------------------------------------------------------------------------------------------------------------------    Medications, Allergies, Social, and Problem List       Current Outpatient Medications   Medication Sig Dispense Refill     alendronate (FOSAMAX) 70 MG tablet TAKE 1 TABLET BY MOUTH EVERY 7 DAYS 12 tablet 3     amLODIPine (NORVASC) 2.5 MG tablet Take 1 tablet (2.5 mg) by mouth 2 times daily 180 tablet 3     calcium carbonate (OS-TIMOTHY) 600 mg calcium (1,500 mg) tablet [CALCIUM CARBONATE (OS-TIMOTHY) 600 MG CALCIUM (1,500 MG) TABLET] Take 600 mg by mouth 2 (two) times a day with meals.       cholecalciferol, vitamin D3, (VITAMIN D3) 2,000 unit cap [CHOLECALCIFEROL, VITAMIN D3, (VITAMIN D3) 2,000 UNIT CAP] Take 1 capsule by mouth daily.       gabapentin (NEURONTIN) 300 MG capsule Take 2 capsules (600 mg) by mouth At Bedtime 180 capsule 1     ibuprofen (ADVIL,MOTRIN) 200 MG tablet [IBUPROFEN (ADVIL,MOTRIN) 200 MG TABLET] Take 400 mg by mouth every 6 (six) hours as needed for pain.       ELIZABETH 128 5 % ophthalmic ointment [ELIZABETH 128 5 % OPHTHALMIC OINTMENT] CORRIE 0.25 INCH TO BOTH EYES QPM        "valACYclovir (VALTREX) 500 MG tablet Take 1 tablet (500 mg) by mouth 2 times daily as needed (In case of cold sore recurrence, take for 3 days, start as soon as symptoms appear) 20 tablet 1     zolpidem (AMBIEN) 10 MG tablet Take 0.5 tablets (5 mg) by mouth nightly as needed 45 tablet 0     No Known Allergies  Social History     Tobacco Use     Smoking status: Former     Years: 4.00     Types: Cigarettes     Smokeless tobacco: Never   Vaping Use     Vaping status: Never Used   Substance Use Topics     Alcohol use: Yes     Alcohol/week: 14.0 standard drinks of alcohol     Types: 14 Standard drinks or equivalent per week     Comment: 14 drinks/week     Drug use: No     Patient Active Problem List   Diagnosis     Recurrent cold sores     Osteoporosis     Actinic keratosis     H/O bilateral breast implants     Bilateral epiphora     Insomnia     Underweight     Hyponatremia     Personal history of colonic polyps     Piriformis muscle pain     Muscle spasm        Reviewed, reconciled and updated       Physical Exam   General Appearance:       /70 (BP Location: Right arm, Patient Position: Sitting, Cuff Size: Adult Small)   Pulse 70   Temp 98.3  F (36.8  C)   Resp 16   Ht 1.626 m (5' 4\")   Wt 45.4 kg (100 lb)   SpO2 99%   BMI 17.16 kg/m      Quite reasonable blood pressure noted today.  Both tympanic canals are occluded with wax.     Additional Information   I spent 20 minutes on this encounter, including reviewing interval history since last visit, examining the patient, explaining and counseling the issues enumerated in the Assessment and Plan (patient given a copy), ordering indicated tests, ordering referrals.       RAJ JUNG MD, MD    "

## 2023-05-02 NOTE — PROGRESS NOTES
"LOV: Procedure visit 2/20/2023  TD: 5/5/2023     HISTORY:  Alma Michele is a 78 year old female with right sided piriformis syndrome. Patient was last seen in clinic 2/20/2023 for piriformis muscle injection under ultrasound guidance with Dr. Renee.     She reports a 2-year history of pain in the right gluteal area.  She says that the pain has been persistent but is slightly better than when it first started 2 years ago.  Around Vinay she had some radiating pain down the right outer thigh but says that is mostly gone at this point.    She was seen today for follow-up after the injection.  She reports that she had about 20% improvement with the injection.  She says she feels her improvement is \"stalled\".      Her pain is aggravated with prolonged walking or walking on uneven surfaces (such as transitioning from a hard floor to carpet). If she rocks from side to side in a seated position it feels tender on the right side. She tends to lean towards the left side when she walks to offload the right side.    She says that physical therapy has helped a little bit.    She takes gabapentin and Ambien at bedtime and says that she is sleeping okay.  She says that her  tells her she moans when she is rolling over but she does not wake up with pain.  Occasionally she will take Advil during the day if she is having more severe pain. Heat or ice help temporarily. Sometimes she wears an SI belt for a short time if she is going up or down the stairs.     Today she rates the pain 0 with sitting and 8 when walking.    She denies weakness, bowel or bladder incontinence, saddle anesthesia, unintentional weight loss, fevers/chills, or night sweats.   There is no history of falls.      PRIOR INJURIES/TREATMENT:   Ice/Heat: Both helped temporarily  Brace: sometimes wears SI belt    Physical Therapy:   Currently working with PT     - Current Pain Medications -   Gabapentin 600 mg bedtime  Ibuprofen    - Prior/Trialed Pain " Medications -   Tylenol -ineffective for her    Prior Procedures:  Date    Procedure   Improvement (%)  2/20/2023 Piriformis inj  20%   10/14/22 R side SI joint  ([facility])   10/3/22 L4 and L5 nerve root block ([facility])   7/14/22 Right L5 TFESI ([facility])   1 right hip steroid injection 1.5 years ago      Prior Related Surgery: none    Other (acupuncture, OMT, CMM, TENS, DME, etc.):   Chiropractor in the past    Specialists Seen - (with most recent, available notes and clinic visits reviewed)   1. Orthopedics -Dr. Pickett  2. [facility]    IMAGING - reviewed   Single views pelvis radiograph(s) 1/5/2023 2:23 PM   Findings:  Single view(s) of the pelvis were obtained.   No acute osseous abnormality.  No substantial degenerative change of hips.  Sacrum and innominate bones are partially obscured by overlying bowel  gas/fecal content.                                                     Impression:  1. No acute osseous abnormality.  2. No substantial degenerative change of the bilateral hips.      Full spine radiographs using EOS   Findings:     12 rib bearing vertebral bodies and 5 lumbar type vertebral bodies are  identified.      Coronal Deformity:     There is no substantial curvature of the spine.      Negative global coronal imbalance.     Sagittal Vertical Axis (A vertical line drawn from the center of C7  (idalmis line) to the posterosuperior aspect of the S1 on sagittal  plane):  less than 4 cm      Weight bearing axis: (Defined as a line drawn from the center of the  femoral head to the mid aspect of the tibial plafond).         Right: Weight bearing axis crosses between tibial spines.       Left: Weight bearing axis crosses between tibial spines.     Leg length:  (Measured from the top of the femoral head to the center  of tibial plafond.  It is assumed joints are in similar degrees of  extension bilaterally.  Significant difference is defined when  discrepancy is greater than 1.5 cm).           No significant   "leg length discrepancy.     Multilevel degenerative changes of the spine most pronounced in the  lumbar spine with there is intervertebrMRI of the lumbar spine 6/16/2022  1.  Lumbar spondylosis disc herniations throughout the lumbar spine.  In conjunction with hypertrophic facet changes there is partial effacement of the caudal foramina without high-grade foraminal stenosis.  Mild to moderate far lateral foraminal stenosis on the left at L5-S1.  2.  Combination of disc bulging and facet degenerative changes resulting in severe spinal canal stenosis lateral recess narrowing at L4-5.  Moderate to severe spinal canal stenosis at L3-L4.al disc height loss and  endplate sclerotic changes involving the lower lumbar spine. Moderate  degenerative changes of the cervical spine.     Additional Findings:     Lungs are clear. Cardiomegaly. Nonobstructive bowel gas pattern.  Vascular calcifications.                                             Impression:  1. No significant coronal curvature of the spine.  2. Negative\" coronal imbalance.  3. No global sagittal imbalance.  4. Cardiomegaly.  5. Multilevel degenerative changes of the spine most pronounced in the  lumbar spine and the cervical spine.      MRI of the lumbar spine 6/16/2022  1.  Lumbar spondylosis disc herniations throughout the lumbar spine.  In conjunction with hypertrophic facet changes there is partial effacement of the caudal foramina without high-grade foraminal stenosis.  Mild to moderate far lateral foraminal stenosis on the left at L5-S1.  2.  Combination of disc bulging and facet degenerative changes resulting in severe spinal canal stenosis lateral recess narrowing at L4-5.  Moderate to severe spinal canal stenosis at L3-L4.       Review Of Systems:  I am responding to those symptoms which are directly relevant to the specific indication for my consultation. I recommend that the patient follow up with their primary or referring provider to pursue any other " symptoms which may be of concern.       Medical History:  She has no past medical history on file.     She has a past surgical history that includes Inject trigger point (Right, 2/20/2023).    Family History  Her family history includes Cerebrovascular Disease in her maternal grandfather; Colon Polyps in her sister; Heart Failure in her father; No Known Problems in her brother and maternal grandmother.     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 Her smoking use included cigarettes. She has never used smokeless tobacco. She reports current alcohol use of about 14.0 standard drinks of alcohol per week. She reports that she does not use drugs.        Current Medications:   She has a current medication list which includes the following prescription(s): alendronate, amlodipine, calcium, cholecalciferol, gabapentin, ibuprofen, janice 128, valacyclovir, and zolpidem.     Allergies:   No Known Allergies    PHYSICAL EXAMINATION:  /76 (BP Location: Right arm, Patient Position: Chair, Cuff Size: Adult Small)   Pulse 74   SpO2 99%    --CONSTITUTIONAL: Vital signs as above. No acute distress. The patient is well nourished and well groomed.  --PSYCHIATRIC: The patient is awake, alert, oriented to person, place, time and answering questions appropriately with clear speech. Appropriate mood and affect   --SKIN:  Posterior torso is clean, dry, intact without rashes.   --RESPIRATORY: Normal rhythm and effort. No abnormal accessory muscle breathing patterns noted.   --GROSS MOTOR: Easily arises from a seated position. Toe walking and heel walking are normal.    --STANDING EXAMINATION: Antalgic gait.  Stands with the torso shifted to the left side.  Posterior pelvic tilt.  --LOWER EXTREMITY MOTOR TESTING:  Plantar flexion left 5/5, right 5/5   Dorsiflexion left 5/5, right 5/5   Great toe MTP extension left 5/5,  right 5/5  Knee flexion left 5/5, right 5/5  Knee extension left 5/5, right 5/5   Hip flexion left 5/5, right 5/5  --MUSCULOSKELETAL: Lumbar spine inspection reveals no evidence of deformity. Range of motion is not limited in lumbar flexion, extension, lateral rotation.  Facet loading maneuvers negative.  No tenderness to palpation lumbar spine. Straight leg raising in the supine position is negative to radicular pain.   Seated piriformis stretch is positive on the right side.  There is muscular fullness on the right side of the piriformis area.  --SACROILIAC JOINT: Negative distraction.  Right side Esther's positive.  Left side Gaenslen's Test elicits right-sided pain.  Sacroiliac Joint Compression Test negative  --HIPS: Full range of motion bilaterally.  Positive right side FABERs.  Mild tenderness to palpation at the right greater trochanter.  No pain with logrolling the hip bilaterally.  --NEUROLOGIC: 2/4 patellar, medial hamstring, and achilles reflexes bilaterally.  Sensation to light touch is intact in the bilateral L4, L5, and S1 dermatomes. No clonus.    --VASCULAR:  Warm lower limbs bilaterally. There is no pitting edema of the bilateral lower extremities.       ASSESSMENT:  Alma Michele is a pleasant 78 year old female who presents with right sided piriformis syndrome, spinal stenosis, neuroforaminal stenosis, and facet arthropathy      #.  Spastic dystonia of right piriformis muscle  #.  Myalgia of the right piriformis muscle  #.  Spasticity of the right piriformis muscle leading to myalgia myofascial pain and chronic piriformis tendinopathy  #Piriformis syndrome  #Muscle spasm  #spinal stenosis  #neuroforaminal stenosis  #facet arthropathy      PLAN:  -She had 20% relief with recent right piriformis muscle injection with steroid. We discussed repeat right piriformis muscle injection with Botox and she was agreeable to that plan. Add order for Botox for the right piriformis muscle with Dr. Renee at the  [facility] location per her preference.  -She continues to work with Physical therapy and was encouraged to continue with her home exercise program  -She is continuing with her current as needed and Advil and gabapentin for pain management  - RTC for Botox injection with Dr. Renee    Ready to learn, no apparent learning barriers.  Education provided on treatment plan according to patient's preferred learning style.  Patient verbalizes understanding.   __________________________________  Betty Connolly NP  Physical Medicine & Rehabilitation        40 minutes spent by me on the date of the encounter doing chart review, history and exam, documentation and further activities per the note

## 2023-05-02 NOTE — PATIENT INSTRUCTIONS
Follow-up multiple issues, Phong doing well, and enjoyed the winter in [facility].  She just got back to [place] last weekend of April 30, 2023.    The main thing we discussed today is blood pressure    Essential hypertension, with blood pressure that I think is overall well controlled, she might be able to reduce the amlodipine dose to 2.5 mg once a day instead of twice a day    May 2, 2023, she showed me home blood pressure readings which vary from a low of about 80/60, up to a high of around 140 over mid 80s.    She to me that loading occurred when she was getting out of a hot shower, and that probably reflects vasodilation.  She should probably avoid bathing and overly hot water.  Also important stay well-hydrated, especially during these warmer summer months.    I suggested she try an experiment of reducing the amlodipine to 2.5 mg ONCE A DAY, and see how her blood pressure behaves over a couple of weeks.  A reasonable blood pressure target would be 130/80 measured at rest in the seated position on the right upper arm.    I think her blood pressure probably is well controlled    One of her daughters is a nurse.  Then she can use her automatic machine to collect data.  I would like to see her resting systolic pressure below 130, diastolic 85 or less.    BP Readings from Last 6 Encounters:   05/02/23 130/70   02/20/23 139/83   12/02/22 110/70   12/14/21 (!) 142/90   09/21/21 110/78   05/26/21 128/78     Bilateral earwax, despite her using over-the-counter peroxide eardrops, will send her to ENT for manual clear out, and once that is done, I want her to use wax dissolving eardrops every week, maybe even twice a week to keep the ears clear.    Successful retina surgery December 6, 2022 for macular pucker  She will be departing for the [facility] after New Year's Day     Underweight, body mass index around 18.   I would like to see her adult a few pounds of healthy lean  muscle mass.     Wt Readings from Last 5 Encounters:   05/02/23 45.4 kg (100 lb)   02/20/23 46.3 kg (102 lb)   01/05/23 46.3 kg (102 lb)   12/02/22 47.6 kg (105 lb)   12/14/21 46.7 kg (103 lb)     Spinal canal stenosis  Right L5 transforaminal epidural steroid injection July 14, 2022  Lumbar spine MRI June 16, 2022  Moderate lumbar spondylosis with broad-based disc herniations evident throughout the lumbar spine.  Disc bulging and facet degenerative changes resulting in severe spinal canal stenosis and lateral recess narrowing at L4-5 and moderate to severe spinal canal stenosis at L3-4.     Takes gabapentin 600 mg at bedtime (2 capsules of 300 mg each)     Right side piriformis syndrome.  After she had consultation with Dr. Sage Pickett at the , she was given a diagnosis of piriformis syndrome, and got an injection done at the  in January 2023 which was helpful  And since then has been doing physical therapy twice a week    But still bothered by pain with ambulation, so she is going to meet with [facility] to consider Botox injection to the chronically contracted muscles    Underwent sacroiliac joint injection on the right side October 14, 2022    Osteoporosis for which she has been on alendronate for approximately 4 years, stopping in 2019, restarted June 2021  Now that it is May 2023, I suggest checking a bone density scan and then take another alendronate drug holiday for a few years    She is doing fine after restart of alendronate, no problems with swallowing.  She is taking it properly on an empty stomach, first thing the morning with a small glass of water, and staying upright for at least an hour    [facility] endocrinology had recommended her to continue alendronate through 2020 to complete 5 years.  However she decided to stop it in 2019.  DEXA scan was May 2019 done at [facility] with a femoral neck T score of -2.5, lumbar spine T score -2.2.     Fariba 3 2021  1. The spine bone density L1-L2 with  T-score -2.4.  2. Femoral bone densities show left total hip T- score -2.9 and right total hip T-score -2.7.  3. Trabecular bone score indicates poor trabecular bone architecture.     I reminded her about the importance of staying on her calcium and vitamin D supplements. She is no history of fractures.  She stays physically active.  Her gait is stable.     Gravelly voice  She has no pain in her throat or neck, and I do not palpate any abnormalities.  I told her 1 possibilities of vocal cord polyps, although she does not strain her vocal cords.  She has never had a smoking habit.  I told her that if she wants to pursue evaluation, that would be an otolaryngology consultation, which would include fiberoptic laryngoscopy.  She will let me know if she wants to proceed with that.     Bilateral epiphora, takes doxycycline low-dose 50 mg once a day, monitored at [facility], Status post bilateral intraocular implants.       Earwax, I suggested to use an over-the-counter wax dissolving eardrop kit, example brand Debrox or Murine     Mild alopecia and spider veins      Insomnia for which he uses every day Ambien, but I really want her to cease alcohol consumption since she uses Ambien.  She told me that alcohol consumption is 1 glass of wine per day.      Recurrent herpes simplex labialis (cold sores) takes Valtrex 500 mg tablet intermittently.       She recall satisfactory screening mammogram done either March or April 2022 near [facility]     Colon polyp, 5 mm in the transverse seen on colonoscopy November 23, 2020.  Recheck in 5 years would be entirely optional.  She told me that the prep was really unpleasant, to the point that it made her vomit.  I told her that I think she could probably stop doing screening colonoscopy, especially since she also had a negative Cologuard on November 6, 2020     Immunizations up-to-date     COVID-19 Vaccine Bivalent Booster 18+ (Moderna) 10/29/2022      Pneumo Conj  13-V (2010&after) 11/01/2016   Pneumococcal 23 valent 08/28/2020      Influenza (High Dose) 3 valent vaccine 09/28/2020, 10/26/2022      Zoster vaccine recombinant adjuvanted (SHINGRIX) 08/21/2018, 05/13/2019

## 2023-05-05 ENCOUNTER — OFFICE VISIT (OUTPATIENT)
Dept: PHYSICAL MEDICINE AND REHAB | Facility: CLINIC | Age: 78
End: 2023-05-05
Payer: MEDICARE

## 2023-05-05 VITALS — SYSTOLIC BLOOD PRESSURE: 119 MMHG | OXYGEN SATURATION: 99 % | DIASTOLIC BLOOD PRESSURE: 76 MMHG | HEART RATE: 74 BPM

## 2023-05-05 DIAGNOSIS — M62.838 SPASM OF RIGHT PIRIFORMIS MUSCLE: Primary | ICD-10-CM

## 2023-05-05 DIAGNOSIS — M79.18 PIRIFORMIS MUSCLE PAIN: ICD-10-CM

## 2023-05-05 DIAGNOSIS — M62.838 MUSCLE SPASM: ICD-10-CM

## 2023-05-05 PROCEDURE — 99215 OFFICE O/P EST HI 40 MIN: CPT | Performed by: NURSE PRACTITIONER

## 2023-05-05 ASSESSMENT — PAIN SCALES - GENERAL: PAINLEVEL: SEVERE PAIN (7)

## 2023-05-05 NOTE — Clinical Note
Please discontinue/ cancel case request for piriformis botox injection. This will be done in clinic and we'll message PM&R clinic. Thanks,

## 2023-05-05 NOTE — LETTER
"5/5/2023       RE: Alma Michele  [address] [place] [address]       Dear Colleague,    Thank you for referring your patient, Alma Michele, to the [facility] PHYSICAL MEDICINE AND REHABILITATION CLINIC [place] at [facility]. Please see a copy of my visit note below.    LOV: Procedure visit 2/20/2023  TD: 5/5/2023     HISTORY:  Alma Michele is a 78 year old female with right sided piriformis syndrome. Patient was last seen in clinic 2/20/2023 for piriformis muscle injection under ultrasound guidance with Dr. Renee.     She reports a 2-year history of pain in the right gluteal area.  She says that the pain has been persistent but is slightly better than when it first started 2 years ago.  Around [place] she had some radiating pain down the right outer thigh but says that is mostly gone at this point.    She was seen today for follow-up after the injection.  She reports that she had about 20% improvement with the injection.  She says she feels her improvement is \"stalled\".      Her pain is aggravated with prolonged walking or walking on uneven surfaces (such as transitioning from a hard floor to carpet). If she rocks from side to side in a seated position it feels tender on the right side. She tends to lean towards the left side when she walks to offload the right side.    She says that physical therapy has helped a little bit.    She takes gabapentin and Ambien at bedtime and says that she is sleeping okay.  She says that her  tells her she moans when she is rolling over but she does not wake up with pain.  Occasionally she will take Advil during the day if she is having more severe pain. Heat or ice help temporarily. Sometimes she wears an SI belt for a short time if she is going up or down the stairs.     Today she rates the pain 0 with sitting and 8 when walking.    She denies weakness, bowel or bladder incontinence, saddle anesthesia, " unintentional weight loss, fevers/chills, or night sweats.   There is no history of falls.      PRIOR INJURIES/TREATMENT:   Ice/Heat: Both helped temporarily  Brace: sometimes wears SI belt    Physical Therapy:   Currently working with PT     - Current Pain Medications -   Gabapentin 600 mg bedtime  Ibuprofen    - Prior/Trialed Pain Medications -   Tylenol -ineffective for her    Prior Procedures:  Date    Procedure   Improvement (%)  2/20/2023 Piriformis inj  20%   10/14/22 R side SI joint  ([facility])   10/3/22 L4 and L5 nerve root block ([facility])   7/14/22 Right L5 TFESI ([facility])   1 right hip steroid injection 1.5 years ago      Prior Related Surgery: none    Other (acupuncture, OMT, CMM, TENS, DME, etc.):   Chiropractor in the past    Specialists Seen - (with most recent, available notes and clinic visits reviewed)   1. Orthopedics -Dr. Pickett  2. [facility]    IMAGING - reviewed   Single views pelvis radiograph(s) 1/5/2023 2:23 PM   Findings:  Single view(s) of the pelvis were obtained.   No acute osseous abnormality.  No substantial degenerative change of hips.  Sacrum and innominate bones are partially obscured by overlying bowel  gas/fecal content.                                                     Impression:  1. No acute osseous abnormality.  2. No substantial degenerative change of the bilateral hips.      Full spine radiographs using EOS   Findings:     12 rib bearing vertebral bodies and 5 lumbar type vertebral bodies are  identified.      Coronal Deformity:     There is no substantial curvature of the spine.      Negative global coronal imbalance.     Sagittal Vertical Axis (A vertical line drawn from the center of C7  (idalmis line) to the posterosuperior aspect of the S1 on sagittal  plane):  less than 4 cm      Weight bearing axis: (Defined as a line drawn from the center of the  femoral head to the mid aspect of the tibial plafond).         Right: Weight bearing axis crosses between tibial  "spines.       Left: Weight bearing axis crosses between tibial spines.     Leg length:  (Measured from the top of the femoral head to the center  of tibial plafond.  It is assumed joints are in similar degrees of  extension bilaterally.  Significant difference is defined when  discrepancy is greater than 1.5 cm).           No significant  leg length discrepancy.     Multilevel degenerative changes of the spine most pronounced in the  lumbar spine with there is intervertebrMRI of the lumbar spine 6/16/2022  1.  Lumbar spondylosis disc herniations throughout the lumbar spine.  In conjunction with hypertrophic facet changes there is partial effacement of the caudal foramina without high-grade foraminal stenosis.  Mild to moderate far lateral foraminal stenosis on the left at L5-S1.  2.  Combination of disc bulging and facet degenerative changes resulting in severe spinal canal stenosis lateral recess narrowing at L4-5.  Moderate to severe spinal canal stenosis at L3-L4.al disc height loss and  endplate sclerotic changes involving the lower lumbar spine. Moderate  degenerative changes of the cervical spine.     Additional Findings:     Lungs are clear. Cardiomegaly. Nonobstructive bowel gas pattern.  Vascular calcifications.                                             Impression:  1. No significant coronal curvature of the spine.  2. Negative\" coronal imbalance.  3. No global sagittal imbalance.  4. Cardiomegaly.  5. Multilevel degenerative changes of the spine most pronounced in the  lumbar spine and the cervical spine.      MRI of the lumbar spine 6/16/2022  1.  Lumbar spondylosis disc herniations throughout the lumbar spine.  In conjunction with hypertrophic facet changes there is partial effacement of the caudal foramina without high-grade foraminal stenosis.  Mild to moderate far lateral foraminal stenosis on the left at L5-S1.  2.  Combination of disc bulging and facet degenerative changes resulting in severe " spinal canal stenosis lateral recess narrowing at L4-5.  Moderate to severe spinal canal stenosis at L3-L4.       Review Of Systems:  I am responding to those symptoms which are directly relevant to the specific indication for my consultation. I recommend that the patient follow up with their primary or referring provider to pursue any other symptoms which may be of concern.       Medical History:  She has no past medical history on file.     She has a past surgical history that includes Inject trigger point (Right, 2/20/2023).    Family History  Her family history includes Cerebrovascular Disease in her maternal grandfather; Colon Polyps in her sister; Heart Failure in her father; No Known Problems in her brother and maternal grandmother.     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 Her smoking use included cigarettes. She has never used smokeless tobacco. She reports current alcohol use of about 14.0 standard drinks of alcohol per week. She reports that she does not use drugs.        Current Medications:   She has a current medication list which includes the following prescription(s): alendronate, amlodipine, calcium, cholecalciferol, gabapentin, ibuprofen, janice 128, valacyclovir, and zolpidem.     Allergies:   No Known Allergies    PHYSICAL EXAMINATION:  /76 (BP Location: Right arm, Patient Position: Chair, Cuff Size: Adult Small)   Pulse 74   SpO2 99%    --CONSTITUTIONAL: Vital signs as above. No acute distress. The patient is well nourished and well groomed.  --PSYCHIATRIC: The patient is awake, alert, oriented to person, place, time and answering questions appropriately with clear speech. Appropriate mood and affect   --SKIN:  Posterior torso is clean, dry, intact without rashes.   --RESPIRATORY: Normal rhythm and effort. No abnormal accessory muscle breathing patterns noted.   --GROSS  MOTOR: Easily arises from a seated position. Toe walking and heel walking are normal.    --STANDING EXAMINATION: Antalgic gait.  Stands with the torso shifted to the left side.  Posterior pelvic tilt.  --LOWER EXTREMITY MOTOR TESTING:  Plantar flexion left 5/5, right 5/5   Dorsiflexion left 5/5, right 5/5   Great toe MTP extension left 5/5, right 5/5  Knee flexion left 5/5, right 5/5  Knee extension left 5/5, right 5/5   Hip flexion left 5/5, right 5/5  --MUSCULOSKELETAL: Lumbar spine inspection reveals no evidence of deformity. Range of motion is not limited in lumbar flexion, extension, lateral rotation.  Facet loading maneuvers negative.  No tenderness to palpation lumbar spine. Straight leg raising in the supine position is negative to radicular pain.   Seated piriformis stretch is positive on the right side.  There is muscular fullness on the right side of the piriformis area.  --SACROILIAC JOINT: Negative distraction.  Right side Esther's positive.  Left side Gaenslen's Test elicits right-sided pain.  Sacroiliac Joint Compression Test negative  --HIPS: Full range of motion bilaterally.  Positive right side FABERs.  Mild tenderness to palpation at the right greater trochanter.  No pain with logrolling the hip bilaterally.  --NEUROLOGIC: 2/4 patellar, medial hamstring, and achilles reflexes bilaterally.  Sensation to light touch is intact in the bilateral L4, L5, and S1 dermatomes. No clonus.    --VASCULAR:  Warm lower limbs bilaterally. There is no pitting edema of the bilateral lower extremities.       ASSESSMENT:  Alma Michele is a pleasant 78 year old female who presents with right sided piriformis syndrome, spinal stenosis, neuroforaminal stenosis, and facet arthropathy      #.  Spastic dystonia of right piriformis muscle  #.  Myalgia of the right piriformis muscle  #.  Spasticity of the right piriformis muscle leading to myalgia myofascial pain and chronic piriformis tendinopathy  #Piriformis  syndrome  #Muscle spasm  #spinal stenosis  #neuroforaminal stenosis  #facet arthropathy      PLAN:  -She had 20% relief with recent right piriformis muscle injection with steroid. We discussed repeat right piriformis muscle injection with Botox and she was agreeable to that plan. Add order for Botox for the right piriformis muscle with Dr. Renee at the [facility] location per her preference.  -She continues to work with Physical therapy and was encouraged to continue with her home exercise program  -She is continuing with her current as needed and Advil and gabapentin for pain management  - RTC for Botox injection with Dr. Renee    Ready to learn, no apparent learning barriers.  Education provided on treatment plan according to patient's preferred learning style.  Patient verbalizes understanding.   __________________________________      40 minutes spent by me on the date of the encounter doing chart review, history and exam, documentation and further activities per the note         Again, thank you for allowing me to participate in the care of your patient.      Sincerely,    SANTI Koehler CNP

## 2023-05-05 NOTE — PATIENT INSTRUCTIONS
It was a pleasure seeing you in the clinic today.  As discussed, we made the following updates to your plan of care:       An referral for Botox was placed today. The schedulers will reach out to you to arrange an appointment with Dr Renee    The botox injections will begin working within 3 days, but may take 3 weeks to reach the maximum effect. Generally the benefits of Botox last about 3 months.     Avoid massaging the area for a few hours after the injection. Do not submerge the injection sites under water for 48 hours after the injection.      After Botox injection please monitor the injection site(s) for any redness, drainage, or swelling and watch for any fevers or chills. This could indicate there is an infection. Please call the clinic immediately or go to the nearest Emergency Room if any of these signs/symptoms occur.         Thank you,  Betty Connolly NP  Physical Medicine and Rehabilitation, Medical Spine  PM&R clinic Phone: [phone]  PM&R clinic Fax: [phone]

## 2023-05-08 DIAGNOSIS — M62.838 MUSCLE SPASM: Primary | ICD-10-CM

## 2023-05-08 DIAGNOSIS — M62.838 SPASM OF RIGHT PIRIFORMIS MUSCLE: ICD-10-CM

## 2023-05-08 DIAGNOSIS — M79.18 PIRIFORMIS MUSCLE PAIN: ICD-10-CM

## 2023-05-09 ENCOUNTER — TELEPHONE (OUTPATIENT)
Dept: PHYSICAL MEDICINE AND REHAB | Facility: CLINIC | Age: 78
End: 2023-05-09
Payer: MEDICARE

## 2023-05-16 ENCOUNTER — TRANSFERRED RECORDS (OUTPATIENT)
Dept: HEALTH INFORMATION MANAGEMENT | Facility: CLINIC | Age: 78
End: 2023-05-16
Payer: MEDICARE

## 2023-05-17 NOTE — TELEPHONE ENCOUNTER
[facility] Call Center    Phone Message    May a detailed message be left on voicemail: yes     Reason for Call: Other: Patient i calling regarding her injections and what needs to be scheduled or changed. Please call back when available.      Action Taken: Other: PMR    Travel Screening: Not Applicable

## 2023-05-17 NOTE — TELEPHONE ENCOUNTER
Notified pt that she only needs to come in for Botox on June 5th, and no need for procedure at the [facility]. Pt understood and agreed with plan to keep her June 5th apt.

## 2023-06-05 ENCOUNTER — OFFICE VISIT (OUTPATIENT)
Dept: PHYSICAL MEDICINE AND REHAB | Facility: CLINIC | Age: 78
End: 2023-06-05
Payer: MEDICARE

## 2023-06-05 VITALS — DIASTOLIC BLOOD PRESSURE: 82 MMHG | HEART RATE: 67 BPM | OXYGEN SATURATION: 98 % | SYSTOLIC BLOOD PRESSURE: 143 MMHG

## 2023-06-05 DIAGNOSIS — M79.18 PIRIFORMIS MUSCLE PAIN: ICD-10-CM

## 2023-06-05 DIAGNOSIS — M62.838 MUSCLE SPASM: ICD-10-CM

## 2023-06-05 DIAGNOSIS — M62.838 SPASM OF RIGHT PIRIFORMIS MUSCLE: Primary | ICD-10-CM

## 2023-06-05 PROCEDURE — 64642 CHEMODENERV 1 EXTREMITY 1-4: CPT | Performed by: PHYSICAL MEDICINE & REHABILITATION

## 2023-06-05 PROCEDURE — 76942 ECHO GUIDE FOR BIOPSY: CPT | Performed by: PHYSICAL MEDICINE & REHABILITATION

## 2023-06-05 ASSESSMENT — PAIN SCALES - GENERAL: PAINLEVEL: SEVERE PAIN (6)

## 2023-06-05 NOTE — LETTER
6/5/2023       RE: Alma Michele  [address] [place] [address]     Dear Colleague,    Thank you for referring your patient, Alma Michele, to the [facility] PHYSICAL MEDICINE AND REHABILITATION CLINIC [place] at [facility]. Please see a copy of my visit note below.    PROCEDURE NOTE: Piriformis Muscle Onabotulinum Toxin-A Injection Under Ultrasound Guidance    PROCEDURE DATE: 6/5/2023    PATIENT NAME: Alma Michele  YOB: 1945    ATTENDING PHYSICIAN: Gary Renee MD  FELLOW/RESIDENT PHYSICIAN: None     PREOPERATIVE DIAGNOSIS:   1. Spasm of right piriformis muscle    2. Muscle spasm    3. Piriformis muscle pain    POSTOPERATIVE DIAGNOSIS: same    PROCEDURE PERFORMED: Right Piriformis Onabotulinum Toxin-A Muscle Injection Under Ultrasound Guidance    ULTRASOUND WAS USED.    INDICATIONS FOR THE PROCEDURE:  Alma Michele is a 78 year old female who presents with piriformis muscle pain and spasm.     PROCEDURE AND FINDINGS:  She was greeted in the clinic. The risk, benefits and alternatives to the procedure were again reviewed with her and informed consent was obtained and the patient agreed to proceed. A time-out was performed. Following review alternatives, benefits and risks, the procedure was carried out under sterile prep with sterile gel. The use of direct sonographic guidance was used to ensure accurate placement of the needle (rather than non-guided injection) and required to minimize the risk of bleeding or injury to nearby neurovascular structures. Images were recorded and stored.     A 5-1MHz ultrasound transducer was used to visualize the relevant structures and determine the optimal needle path for the procedure.  2mL 1% lidocaine was infiltrated at the needle insertion site subcutaneously. Then, a 22 gauge 5-inch Quincke spinal needle was advanced from lateral to medial utilizing an in-plane approach in long axis, under continuous  ultrasound guidance to the piriformis muscle on the right. After negative aspiration, slow injection of the treatment solution 2mL total consisting of 100 units onabotulinum toxin (Botox) reconstituted in 2mL preservative free normal saline was instilled into affected area. The tip of the needle was visualized throughout the procedure. The remainder of the single-use vials were discarded.      She tolerated the procedure well, was discharged home in stable condition.     Follow-up will be in clinic or x3 months for repeat Botox injection.     COMPLICATIONS: None    COMMENTS: None    Gary Renee MD

## 2023-06-05 NOTE — PROGRESS NOTES
PROCEDURE NOTE: Piriformis Muscle Onabotulinum Toxin-A Injection Under Ultrasound Guidance    PROCEDURE DATE: 6/5/2023    PATIENT NAME: Alma Michele  YOB: 1945    ATTENDING PHYSICIAN: Gary Renee MD  FELLOW/RESIDENT PHYSICIAN: None     PREOPERATIVE DIAGNOSIS:   1. Spasm of right piriformis muscle    2. Muscle spasm    3. Piriformis muscle pain    POSTOPERATIVE DIAGNOSIS: same    PROCEDURE PERFORMED: Right Piriformis Onabotulinum Toxin-A Muscle Injection Under Ultrasound Guidance    ULTRASOUND WAS USED.    INDICATIONS FOR THE PROCEDURE:  Alma Michele is a 78 year old female who presents with piriformis muscle pain and spasm.     PROCEDURE AND FINDINGS:  She was greeted in the clinic. The risk, benefits and alternatives to the procedure were again reviewed with her and informed consent was obtained and the patient agreed to proceed. A time-out was performed. Following review alternatives, benefits and risks, the procedure was carried out under sterile prep with sterile gel. The use of direct sonographic guidance was used to ensure accurate placement of the needle (rather than non-guided injection) and required to minimize the risk of bleeding or injury to nearby neurovascular structures. Images were recorded and stored.     A 5-1MHz ultrasound transducer was used to visualize the relevant structures and determine the optimal needle path for the procedure.  2mL 1% lidocaine was infiltrated at the needle insertion site subcutaneously. Then, a 22 gauge 5-inch Quincke spinal needle was advanced from lateral to medial utilizing an in-plane approach in long axis, under continuous ultrasound guidance to the piriformis muscle on the right. After negative aspiration, slow injection of the treatment solution 2mL total consisting of 100 units onabotulinum toxin (Botox) reconstituted in 2mL preservative free normal saline was instilled into affected area. The tip of the needle was visualized throughout the  procedure. The remainder of the single-use vials were discarded.      She tolerated the procedure well, was discharged home in stable condition.     Follow-up will be in clinic or x3 months for repeat Botox injection.     COMPLICATIONS: None    COMMENTS: None

## 2023-06-07 ENCOUNTER — ANCILLARY PROCEDURE (OUTPATIENT)
Dept: BONE DENSITY | Facility: CLINIC | Age: 78
End: 2023-06-07
Attending: INTERNAL MEDICINE
Payer: MEDICARE

## 2023-06-07 DIAGNOSIS — M81.0 AGE-RELATED OSTEOPOROSIS WITHOUT CURRENT PATHOLOGICAL FRACTURE: Primary | ICD-10-CM

## 2023-06-07 DIAGNOSIS — M81.0 AGE-RELATED OSTEOPOROSIS WITHOUT CURRENT PATHOLOGICAL FRACTURE: ICD-10-CM

## 2023-06-07 PROCEDURE — 77080 DXA BONE DENSITY AXIAL: CPT | Mod: TC | Performed by: PHYSICIAN ASSISTANT

## 2023-06-08 NOTE — CONFIDENTIAL NOTE
RECORDS RECEIVED FROM: internal /ce    DATE RECEIVED: 8/9/23    NOTES (FOR ALL VISITS) STATUS DETAILS   OFFICE NOTES from referring provider internal  Andrea Cruz MD     MEDICATION LIST internal     IMAGING        [facility]     LABS     DIABETES: HBGA1C, CREATININE, FASTING LIPIDS, MICROALBUMIN URINE, POTASSIUM, TSH, T4    THYROID: TSH, T4, CBC, THYRODLONULIN, TOTAL T3, FREE T4, CALCITONIN, CEA internal    TSH- 12.2.22  T4- 12.2.22  CBC- 12.2.22  CMP- 12.2.22  Parathyroid- 9.21.21   Vitamin D- 9.21.21   Lipid- 9.21.21

## 2023-06-19 ENCOUNTER — TRANSFERRED RECORDS (OUTPATIENT)
Dept: HEALTH INFORMATION MANAGEMENT | Facility: CLINIC | Age: 78
End: 2023-06-19
Payer: MEDICARE

## 2023-07-11 ENCOUNTER — TELEPHONE (OUTPATIENT)
Dept: CALL CENTER | Age: 78
End: 2023-07-11
Payer: MEDICARE

## 2023-07-11 ENCOUNTER — TRANSFERRED RECORDS (OUTPATIENT)
Dept: HEALTH INFORMATION MANAGEMENT | Facility: CLINIC | Age: 78
End: 2023-07-11
Payer: MEDICARE

## 2023-07-11 NOTE — TELEPHONE ENCOUNTER
[facility] Call Center    Phone Message    May a detailed message be left on voicemail: yes     Reason for Call: Appointment Intake    Referring Provider Name:  Manuel Singh, right eye optomotrist  Diagnosis and/or Symptoms: Decreasing VA RE, patient had corrected BVA without change in the refracted error. Uncorrected vision RE is worse.     Diagnosis not in guidelines therefore writer unable to schedule. Referral and medical records faxed on it's way. Deena from [facility] can be reached at [phone] or patient at [phone]. Thank you.    Action Taken: Message routed to:  [facility] ([facility] Eye    Travel Screening: Not Applicable

## 2023-07-12 ENCOUNTER — TRANSFERRED RECORDS (OUTPATIENT)
Dept: HEALTH INFORMATION MANAGEMENT | Facility: CLINIC | Age: 78
End: 2023-07-12
Payer: MEDICARE

## 2023-07-12 ENCOUNTER — TRANSCRIBE ORDERS (OUTPATIENT)
Dept: OTHER | Age: 78
End: 2023-07-12

## 2023-07-12 DIAGNOSIS — H53.8 BLURRED VISION: Primary | ICD-10-CM

## 2023-07-12 NOTE — TELEPHONE ENCOUNTER
Referral to neuro-ophthalmology for h/o right eye decrease vision    Will ask Neuro-ophthalmology team to review referral/notes once received (no notes scanned) and assist in scheduling options.    Haseeb Means RN 5:49 PM 07/12/23

## 2023-07-13 ENCOUNTER — TELEPHONE (OUTPATIENT)
Dept: OPHTHALMOLOGY | Facility: CLINIC | Age: 78
End: 2023-07-13
Payer: MEDICARE

## 2023-07-13 NOTE — TELEPHONE ENCOUNTER
Called and spoke to domingo    Made her appointment for 8/29 at 830 am with Dr. Cruz     Discussed wait time     Insurance     Appointment information will be on my chart     Provided the clinic address     Reina Nelson Communication Facilitator on 7/13/2023 at 12:03 PM

## 2023-08-02 ASSESSMENT — ENCOUNTER SYMPTOMS
TROUBLE SWALLOWING: 0
STIFFNESS: 0
BACK PAIN: 1
MUSCLE WEAKNESS: 0
EYE PAIN: 0
SMELL DISTURBANCE: 0
JOINT SWELLING: 0
MYALGIAS: 1
DOUBLE VISION: 0
SINUS CONGESTION: 0
EYE IRRITATION: 0
HOARSE VOICE: 1
ARTHRALGIAS: 0
NECK MASS: 0
EYE REDNESS: 0
TASTE DISTURBANCE: 0
NECK PAIN: 0
EYE WATERING: 1
SORE THROAT: 0
MUSCLE CRAMPS: 0
SINUS PAIN: 0

## 2023-08-03 ENCOUNTER — LAB (OUTPATIENT)
Dept: LAB | Facility: CLINIC | Age: 78
End: 2023-08-03
Payer: MEDICARE

## 2023-08-03 ENCOUNTER — OFFICE VISIT (OUTPATIENT)
Dept: ENDOCRINOLOGY | Facility: CLINIC | Age: 78
End: 2023-08-03
Attending: INTERNAL MEDICINE
Payer: MEDICARE

## 2023-08-03 VITALS
OXYGEN SATURATION: 99 % | SYSTOLIC BLOOD PRESSURE: 146 MMHG | HEIGHT: 64 IN | BODY MASS INDEX: 17.75 KG/M2 | DIASTOLIC BLOOD PRESSURE: 85 MMHG | HEART RATE: 62 BPM | WEIGHT: 104 LBS

## 2023-08-03 DIAGNOSIS — M81.0 AGE-RELATED OSTEOPOROSIS WITHOUT CURRENT PATHOLOGICAL FRACTURE: ICD-10-CM

## 2023-08-03 DIAGNOSIS — M81.0 AGE-RELATED OSTEOPOROSIS WITHOUT CURRENT PATHOLOGICAL FRACTURE: Primary | ICD-10-CM

## 2023-08-03 DIAGNOSIS — M81.0 OSTEOPOROSIS, UNSPECIFIED OSTEOPOROSIS TYPE, UNSPECIFIED PATHOLOGICAL FRACTURE PRESENCE: ICD-10-CM

## 2023-08-03 DIAGNOSIS — R93.89 ABNORMAL FINDINGS ON DIAGNOSTIC IMAGING OF OTHER SPECIFIED BODY STRUCTURES: ICD-10-CM

## 2023-08-03 LAB
ALBUMIN SERPL BCG-MCNC: 4.6 G/DL (ref 3.5–5.2)
ALP SERPL-CCNC: 57 U/L (ref 35–104)
ALT SERPL W P-5'-P-CCNC: 15 U/L (ref 0–50)
ANION GAP SERPL CALCULATED.3IONS-SCNC: 9 MMOL/L (ref 7–15)
AST SERPL W P-5'-P-CCNC: 24 U/L (ref 0–45)
BILIRUB SERPL-MCNC: 0.6 MG/DL
BUN SERPL-MCNC: 15.2 MG/DL (ref 8–23)
CALCIUM SERPL-MCNC: 9.4 MG/DL (ref 8.8–10.2)
CHLORIDE SERPL-SCNC: 98 MMOL/L (ref 98–107)
CREAT SERPL-MCNC: 0.66 MG/DL (ref 0.51–0.95)
DEPRECATED CALCIDIOL+CALCIFEROL SERPL-MC: 94 UG/L (ref 20–75)
DEPRECATED HCO3 PLAS-SCNC: 27 MMOL/L (ref 22–29)
GFR SERPL CREATININE-BSD FRML MDRD: 90 ML/MIN/1.73M2
GLUCOSE SERPL-MCNC: 96 MG/DL (ref 70–99)
POTASSIUM SERPL-SCNC: 4.5 MMOL/L (ref 3.4–5.3)
PROT SERPL-MCNC: 7.1 G/DL (ref 6.4–8.3)
PTH-INTACT SERPL-MCNC: 37 PG/ML (ref 15–65)
SODIUM SERPL-SCNC: 134 MMOL/L (ref 136–145)
T4 FREE SERPL-MCNC: 1.22 NG/DL (ref 0.9–1.7)
TOTAL PROTEIN SERUM FOR ELP: 7 G/DL (ref 6.4–8.3)
TSH SERPL DL<=0.005 MIU/L-ACNC: 2.24 UIU/ML (ref 0.3–4.2)
TTG IGA SER-ACNC: 0.4 U/ML
TTG IGG SER-ACNC: 0.7 U/ML

## 2023-08-03 PROCEDURE — 84165 PROTEIN E-PHORESIS SERUM: CPT | Mod: 26

## 2023-08-03 PROCEDURE — 82306 VITAMIN D 25 HYDROXY: CPT | Performed by: INTERNAL MEDICINE

## 2023-08-03 PROCEDURE — 36415 COLL VENOUS BLD VENIPUNCTURE: CPT | Performed by: PATHOLOGY

## 2023-08-03 PROCEDURE — 84155 ASSAY OF PROTEIN SERUM: CPT | Performed by: INTERNAL MEDICINE

## 2023-08-03 PROCEDURE — 80053 COMPREHEN METABOLIC PANEL: CPT | Performed by: PATHOLOGY

## 2023-08-03 PROCEDURE — 84443 ASSAY THYROID STIM HORMONE: CPT | Performed by: PATHOLOGY

## 2023-08-03 PROCEDURE — 99000 SPECIMEN HANDLING OFFICE-LAB: CPT | Performed by: PATHOLOGY

## 2023-08-03 PROCEDURE — 99204 OFFICE O/P NEW MOD 45 MIN: CPT | Mod: GC | Performed by: INTERNAL MEDICINE

## 2023-08-03 PROCEDURE — 84165 PROTEIN E-PHORESIS SERUM: CPT | Mod: TC | Performed by: PATHOLOGY

## 2023-08-03 PROCEDURE — 83970 ASSAY OF PARATHORMONE: CPT | Performed by: PATHOLOGY

## 2023-08-03 PROCEDURE — 84439 ASSAY OF FREE THYROXINE: CPT | Performed by: PATHOLOGY

## 2023-08-03 PROCEDURE — 86364 TISS TRNSGLTMNASE EA IG CLAS: CPT | Mod: 59 | Performed by: INTERNAL MEDICINE

## 2023-08-03 RX ORDER — ALBUTEROL SULFATE 90 UG/1
1-2 AEROSOL, METERED RESPIRATORY (INHALATION)
Status: CANCELLED
Start: 2023-08-10

## 2023-08-03 RX ORDER — HEPARIN SODIUM (PORCINE) LOCK FLUSH IV SOLN 100 UNIT/ML 100 UNIT/ML
5 SOLUTION INTRAVENOUS
Status: CANCELLED | OUTPATIENT
Start: 2023-08-10

## 2023-08-03 RX ORDER — ZOLEDRONIC ACID 5 MG/100ML
5 INJECTION, SOLUTION INTRAVENOUS ONCE
Status: CANCELLED
Start: 2023-08-10

## 2023-08-03 RX ORDER — HEPARIN SODIUM,PORCINE 10 UNIT/ML
5-20 VIAL (ML) INTRAVENOUS DAILY PRN
Status: CANCELLED | OUTPATIENT
Start: 2023-08-10

## 2023-08-03 RX ORDER — ALBUTEROL SULFATE 0.83 MG/ML
2.5 SOLUTION RESPIRATORY (INHALATION)
Status: CANCELLED | OUTPATIENT
Start: 2023-08-10

## 2023-08-03 RX ORDER — DIPHENHYDRAMINE HYDROCHLORIDE 50 MG/ML
50 INJECTION INTRAMUSCULAR; INTRAVENOUS
Status: CANCELLED
Start: 2023-08-10

## 2023-08-03 RX ORDER — METHYLPREDNISOLONE SODIUM SUCCINATE 125 MG/2ML
125 INJECTION, POWDER, LYOPHILIZED, FOR SOLUTION INTRAMUSCULAR; INTRAVENOUS
Status: CANCELLED
Start: 2023-08-10

## 2023-08-03 RX ORDER — EPINEPHRINE 1 MG/ML
0.3 INJECTION, SOLUTION, CONCENTRATE INTRAVENOUS EVERY 5 MIN PRN
Status: CANCELLED | OUTPATIENT
Start: 2023-08-10

## 2023-08-03 RX ORDER — ACETAMINOPHEN 325 MG/1
650 TABLET ORAL
Status: CANCELLED | OUTPATIENT
Start: 2023-08-10

## 2023-08-03 ASSESSMENT — ENCOUNTER SYMPTOMS
BACK PAIN: 1
SORE THROAT: 0
EYE PAIN: 0
TROUBLE SWALLOWING: 0
NECK PAIN: 0
MYALGIAS: 1
ARTHRALGIAS: 0
SINUS PAIN: 0
JOINT SWELLING: 0
EYE REDNESS: 0

## 2023-08-03 ASSESSMENT — PAIN SCALES - GENERAL: PAINLEVEL: MILD PAIN (2)

## 2023-08-03 NOTE — PATIENT INSTRUCTIONS
"  To schedule a lab appointment,  Either use [place]  Or call as below    Lab    General [phone]   [facility] [phone]   [place] [phone]   [facility]  [phone]   [facility] [phone]   [place] [phone]   [place] ([facility]) [phone]   [facility]   [place] [phone]   [place] [phone]   [place] [phone]   [place] [phone]       Please call the Infusion Sites and set up appointment for you injections/infusions.   This will trigger the prior authorization process for the bone medication if needed    [facility]: [phone] (option 3 then option 2)  [place]: [phone]  [place] ([facility]): [phone] (oral glucose scheduled in lab)  [place]: [phone] (oral glucose scheduled in lab)  [place]: [phone]  [facility]): [phone]  [facility]): [phone]      Please reach out to the following centers to schedule your imaging appointment:   Imaging (DEXA, CT, MRI, XRAY)    [facility] ([facility], [facility] [place]) [phone]   [facility] ([place], [place]) [phone]   [facility] ([facility]) [phone]   [facility] ([facility]) [phone]       To get a \"Good [place] Estimate\" for out of pocket costs: tel [phone]   "

## 2023-08-03 NOTE — NURSING NOTE
"Chief Complaint   Patient presents with    Osteoporosis     Vital signs:      BP: (!) 146/85 Pulse: 62     SpO2: 99 %     Height: 162.6 cm (5' 4\") Weight: 47.2 kg (104 lb)  Estimated body mass index is 17.85 kg/m  as calculated from the following:    Height as of this encounter: 1.626 m (5' 4\").    Weight as of this encounter: 47.2 kg (104 lb).        "

## 2023-08-03 NOTE — PROGRESS NOTES
Endocrinology and Diabetes      Alma Michele is a 78 year old yo female who is being referred for: Osteoporosis    Her  past medical history includes: HTN, Osteoporosis    She was initially diagnosed in 2009 based on D[facility] available [facility]    5/18/2009  Femur Neck: BMD =  0.712 g/cm (sq)   T-score = -2.4      Z-score = -0.7     Total Hip: BMD =  0.683 g/cm (sq)   T-score = -2.6      Z-score = -1.2     Right Hip [average of 2 scans]:   Femur Neck: BMD =  0.689 g/cm (sq)   T-score = -2.5      Z-score = -0.9   Total Hip: BMD =  0.695 g/cm (sq)   T-score = -2.5      Z-score = -1.1     Lumbar Spine [average of 2 scans]:   L1: BMD = 0.836 g/cm (sq), T-score =-2.5, Z-score =-0.6   L2: BMD = 0.906 g/cm (sq), T-score =-2.5, Z-score =-0.6   Total Lumbar Spine: BMD =  0.873 g/cm (sq)   T-score = -2.5      Z-score = -0.6     She does not recall having started medical therapy at this time, but was started on Alendronate around 2015 and sh continued to take until 2019. She stopped one year earlier than planned for her drug holiday.     She restarted on September of 2021 after she completed 2 years of drug holiday.     She has been taking Alendronate consistently and has not had any side effects.     She had a follow up DXA this year which showed 6 % loss of BMD for which she was referred for further evaluation.     She has not had any vertebral or hip fractures. There is no family history of osteoporotic fractures.     Calcium and Vitamin D. She takes a calcium supplement twice a day,  600 mg twice daily. She consumes cheese, yogurt, leafy green vegetables. Vitamin D she takes 2,000 international unit(s) daily.     During the pandemic, she had been taking higher doses of Vitamin D about 3-4 tablets at some point and she had elevated Vitamin D. This was later reduced.     Exercise has been difficult due to hip pain.           No Known Allergies    Past Medical History:   Diagnosis Date     Essential hypertension       "Osteoporosis        Past Surgical History:   Procedure Laterality Date     INJECT TRIGGER POINT Right 2/20/2023    Procedure: ultrasound-guided right piriformis muscle injection;  Surgeon: Gary Renee MD;  Location: [facility] OR       Social History     Tobacco Use     Smoking status: Never     Smokeless tobacco: Never   Vaping Use     Vaping Use: Never used   Substance Use Topics     Alcohol use: Yes     Alcohol/week: 14.0 standard drinks of alcohol     Types: 14 Standard drinks or equivalent per week     Comment: 14 drinks/week     Drug use: No         Review of Systems   HENT:  Negative for ear discharge, ear pain, hearing loss, mouth sores, nosebleeds, sinus pain, sore throat, tinnitus and trouble swallowing.    Eyes:  Negative for pain and redness.   Musculoskeletal:  Positive for back pain and myalgias. Negative for arthralgias, joint swelling and neck pain.        Objective    BP (!) 146/85 (BP Location: Right arm, Patient Position: Sitting, Cuff Size: Adult Small)   Pulse 62   Ht 1.626 m (5' 4\")   Wt 47.2 kg (104 lb)   SpO2 99%   BMI 17.85 kg/m       Wt Readings from Last 3 Encounters:   08/03/23 47.2 kg (104 lb)   05/02/23 45.4 kg (100 lb)   02/20/23 46.3 kg (102 lb)        Body mass index is 17.85 kg/m .    Physical Exam  Constitutional:       Appearance: She is not ill-appearing.   Cardiovascular:      Rate and Rhythm: Normal rate and regular rhythm.      Heart sounds: Normal heart sounds. No murmur heard.  Pulmonary:      Effort: Pulmonary effort is normal.      Breath sounds: Normal breath sounds.   Abdominal:      Palpations: Abdomen is soft.      Tenderness: There is no abdominal tenderness.      Comments: 3 finger breadths between lower ribcage and iliac crest   Neurological:      Mental Status: She is alert.           DXA 6/7/2023  DXA RESULTS  -Lumbar Spine: L1-L2: BMD: 0.828 g/cm2. T-score: -2.8. Z-score: -0.4.  -RIGHT Hip Total: BMD: 0.624 g/cm2. T-score: -3.0. Z-score: -0.8.  -RIGHT Hip " Femoral neck: BMD: 0.617 g/cm2. T-score: -3.0. Z-score: -0.6.  -LEFT Hip Total: BMD: 0.631 g/cm2. T-score: -3.0. Z-score: -0.7.  -LEFT Hip Femoral neck: BMD: 0.688 g/cm2. T-score: -2.5. Z-score: -0.1.    INTERVAL CHANGE  -There has been a 5.9% decrease in lumbar spine BMD (L1-L2).  -There has been a 6.9% decrease in the right hip BMD.    Assessment.       ICD-10-CM    1. Age-related osteoporosis without current pathological fracture  M81.0 Adult Endocrinology  Referral     TSH     T4 free     Protein electrophoresis     Parathyroid Hormone Intact     Comprehensive metabolic panel     Vitamin D Deficiency (D3 Only)     Tissue transglutaminase andria IgA and IgG     Dexa hip/pelvis/spine      2. Abnormal findings on diagnostic imaging of other specified body structures  R93.89 TSH     T4 free      3. Osteoporosis, unspecified osteoporosis type, unspecified pathological fracture presence  M81.0            Plan    Osteoporosis  Ms. Michele is here for evaluation of osteoporosis after she has had significant bone mineral density loss on follow-up DEXA.    At this point I recommend doing evaluation for secondary causes of osteoporosis given the decrease in bone mineral density.  Evaluation for celiac disease and hyperparathyroidism has been done in the past and negative in 2021    Asides from reevaluation for secondary causes I recommend to change therapy.  We discussed on stopping alendronate at this time.  Discussed on options including denosumab and zoledronic acid.  I would recommend once yearly zoledronic acid with plans for treatment for the following 3 years followed by a 2-3 year drug holiday.    If she were to have fractures while on treatment, consideration would be made to start anabolic therapy, with either teriparatide or romozosumab.          Denilson Gallardo MD  Endocrinology Fellow     Attending tie-in note  I saw the patient with endocrine fellow Dr. Gallardo and directly examined patient and discussed.  Agree above note and plan.       45 minutes spent on the date of the encounter doing chart review, history and exam, documentation and further activities as noted above.    Olivia Liao MD  Staff Physician  Endocrinology and Metabolism  [facility]  License: [id number]  Pager: [phone]

## 2023-08-03 NOTE — ASSESSMENT & PLAN NOTE
Ms. Michele is here for evaluation of osteoporosis after she has had significant bone mineral density loss on follow-up DEXA.    At this point I recommend doing evaluation for secondary causes of osteoporosis given the decrease in bone mineral density.  Evaluation for celiac disease and hyperparathyroidism has been done in the past and negative in 2021    Asides from reevaluation for secondary causes I recommend to change therapy.  We discussed on stopping alendronate at this time.  Discussed on options including denosumab and zoledronic acid.  I would recommend once yearly zoledronic acid with plans for treatment for the following 3 years followed by a 2-3 year drug holiday.    If she were to have fractures while on treatment, consideration would be made to start anabolic therapy, with either teriparatide or romozosumab.

## 2023-08-03 NOTE — LETTER
8/3/2023       RE: Amla Michele  [address] [place] [address]     Dear Colleague,    Thank you for referring your patient, Alma Michele, to the [facility] [place] [facility]. Please see a copy of my visit note below.    Endocrinology and Diabetes      Alma Michele is a 78 year old yo female who is being referred for: Osteoporosis    Her  past medical history includes: HTN, Osteoporosis    She was initially diagnosed in 2009 based on DXA available at [facility]    5/18/2009  Femur Neck: BMD =  0.712 g/cm (sq)   T-score = -2.4      Z-score = -0.7     Total Hip: BMD =  0.683 g/cm (sq)   T-score = -2.6      Z-score = -1.2     Right Hip [average of 2 scans]:   Femur Neck: BMD =  0.689 g/cm (sq)   T-score = -2.5      Z-score = -0.9   Total Hip: BMD =  0.695 g/cm (sq)   T-score = -2.5      Z-score = -1.1     Lumbar Spine [average of 2 scans]:   L1: BMD = 0.836 g/cm (sq), T-score =-2.5, Z-score =-0.6   L2: BMD = 0.906 g/cm (sq), T-score =-2.5, Z-score =-0.6   Total Lumbar Spine: BMD =  0.873 g/cm (sq)   T-score = -2.5      Z-score = -0.6     She does not recall having started medical therapy at this time, but was started on Alendronate around 2015 and sh continued to take until 2019. She stopped one year earlier than planned for her drug holiday.     She restarted on September of 2021 after she completed 2 years of drug holiday.     She has been taking Alendronate consistently and has not had any side effects.     She had a follow up DXA this year which showed 6 % loss of BMD for which she was referred for further evaluation.     She has not had any vertebral or hip fractures. There is no family history of osteoporotic fractures.     Calcium and Vitamin D. She takes a calcium supplement twice a day,  600 mg twice daily. She consumes cheese, yogurt, leafy green vegetables. Vitamin D she takes 2,000 international unit(s) daily.  "    During the pandemic, she had been taking higher doses of Vitamin D about 3-4 tablets at some point and she had elevated Vitamin D. This was later reduced.     Exercise has been difficult due to hip pain.           No Known Allergies    Past Medical History:   Diagnosis Date    Essential hypertension     Osteoporosis        Past Surgical History:   Procedure Laterality Date    INJECT TRIGGER POINT Right 2/20/2023    Procedure: ultrasound-guided right piriformis muscle injection;  Surgeon: Gary Renee MD;  Location: [facility] OR       Social History     Tobacco Use    Smoking status: Never    Smokeless tobacco: Never   Vaping Use    Vaping Use: Never used   Substance Use Topics    Alcohol use: Yes     Alcohol/week: 14.0 standard drinks of alcohol     Types: 14 Standard drinks or equivalent per week     Comment: 14 drinks/week    Drug use: No         Review of Systems   HENT:  Negative for ear discharge, ear pain, hearing loss, mouth sores, nosebleeds, sinus pain, sore throat, tinnitus and trouble swallowing.    Eyes:  Negative for pain and redness.   Musculoskeletal:  Positive for back pain and myalgias. Negative for arthralgias, joint swelling and neck pain.        Objective    BP (!) 146/85 (BP Location: Right arm, Patient Position: Sitting, Cuff Size: Adult Small)   Pulse 62   Ht 1.626 m (5' 4\")   Wt 47.2 kg (104 lb)   SpO2 99%   BMI 17.85 kg/m       Wt Readings from Last 3 Encounters:   08/03/23 47.2 kg (104 lb)   05/02/23 45.4 kg (100 lb)   02/20/23 46.3 kg (102 lb)        Body mass index is 17.85 kg/m .    Physical Exam  Constitutional:       Appearance: She is not ill-appearing.   Cardiovascular:      Rate and Rhythm: Normal rate and regular rhythm.      Heart sounds: Normal heart sounds. No murmur heard.  Pulmonary:      Effort: Pulmonary effort is normal.      Breath sounds: Normal breath sounds.   Abdominal:      Palpations: Abdomen is soft.      Tenderness: There is no abdominal tenderness.      " Comments: 3 finger breadths between lower ribcage and iliac crest   Neurological:      Mental Status: She is alert.           DXA 6/7/2023  DXA RESULTS  -Lumbar Spine: L1-L2: BMD: 0.828 g/cm2. T-score: -2.8. Z-score: -0.4.  -RIGHT Hip Total: BMD: 0.624 g/cm2. T-score: -3.0. Z-score: -0.8.  -RIGHT Hip Femoral neck: BMD: 0.617 g/cm2. T-score: -3.0. Z-score: -0.6.  -LEFT Hip Total: BMD: 0.631 g/cm2. T-score: -3.0. Z-score: -0.7.  -LEFT Hip Femoral neck: BMD: 0.688 g/cm2. T-score: -2.5. Z-score: -0.1.    INTERVAL CHANGE  -There has been a 5.9% decrease in lumbar spine BMD (L1-L2).  -There has been a 6.9% decrease in the right hip BMD.    Assessment.       ICD-10-CM    1. Age-related osteoporosis without current pathological fracture  M81.0 Adult Endocrinology  Referral     TSH     T4 free     Protein electrophoresis     Parathyroid Hormone Intact     Comprehensive metabolic panel     Vitamin D Deficiency (D3 Only)     Tissue transglutaminase andria IgA and IgG     Dexa hip/pelvis/spine      2. Abnormal findings on diagnostic imaging of other specified body structures  R93.89 TSH     T4 free      3. Osteoporosis, unspecified osteoporosis type, unspecified pathological fracture presence  M81.0            Plan    Osteoporosis  Ms. Michele is here for evaluation of osteoporosis after she has had significant bone mineral density loss on follow-up DEXA.    At this point I recommend doing evaluation for secondary causes of osteoporosis given the decrease in bone mineral density.  Evaluation for celiac disease and hyperparathyroidism has been done in the past and negative in 2021    Asides from reevaluation for secondary causes I recommend to change therapy.  We discussed on stopping alendronate at this time.  Discussed on options including denosumab and zoledronic acid.  I would recommend once yearly zoledronic acid with plans for treatment for the following 3 years followed by a 2-3 year drug holiday.    If she were to  have fractures while on treatment, consideration would be made to start anabolic therapy, with either teriparatide or romozosumab.          Denilson Gallardo MD  Endocrinology Fellow     Attending tie-in note  I saw the patient with endocrine fellow Dr. Gallardo and directly examined patient and discussed. Agree above note and plan.       45 minutes spent on the date of the encounter doing chart review, history and exam, documentation and further activities as noted above.    Olivia Liao MD  Staff Physician  Endocrinology and Metabolism  [facility]  License: [id number]  Pager: [phone]

## 2023-08-04 ENCOUNTER — TELEPHONE (OUTPATIENT)
Dept: INTERNAL MEDICINE | Facility: CLINIC | Age: 78
End: 2023-08-04
Payer: MEDICARE

## 2023-08-04 LAB
ALBUMIN SERPL ELPH-MCNC: 4.5 G/DL (ref 3.7–5.1)
ALPHA1 GLOB SERPL ELPH-MCNC: 0.3 G/DL (ref 0.2–0.4)
ALPHA2 GLOB SERPL ELPH-MCNC: 0.6 G/DL (ref 0.5–0.9)
B-GLOBULIN SERPL ELPH-MCNC: 0.7 G/DL (ref 0.6–1)
GAMMA GLOB SERPL ELPH-MCNC: 0.9 G/DL (ref 0.7–1.6)
M PROTEIN SERPL ELPH-MCNC: 0 G/DL
PROT PATTERN SERPL ELPH-IMP: NORMAL

## 2023-08-04 NOTE — TELEPHONE ENCOUNTER
"S-(situation): elevated blood pressure    B-(background): pt saw endocrinology 8/3 and had 146/85 BP and was told to follow up with PCP. Pt took BP today and had 145/81 in AM, repeated later BP in other arm 152/85.    A-(assessment): Denies chest pain, SOB, headache, vision changes    R-(recommendations): schedule follow up appt with Dr. Cruz. Pt states she is going out of town for a week. Pt was on amlodipine BID prior to 2/2023 but this was decreased to daily in [place] \"you should be able to see all my records. Can Dr. Cruz just decide if I need to go on amlodipine BID or not?\"    "

## 2023-08-04 NOTE — TELEPHONE ENCOUNTER
Relayed Dr. Cruz's message.     Pt states that she told Dr. Cruz in the past she was only taking amlodipine daily and not BID, but will resume BID now

## 2023-08-08 NOTE — RESULT ENCOUNTER NOTE
Spoke with patient. Labs are normal with the exception of Vitamin D which is still elevated. Unable to verify which strength she is currently taking. Recommended to continue with current supplement but to take every other day and to recheck Vitamin D in 2-3 months.     Denilson Gallardo MD  Endocrinology Fellow

## 2023-08-09 ENCOUNTER — PRE VISIT (OUTPATIENT)
Dept: ENDOCRINOLOGY | Facility: CLINIC | Age: 78
End: 2023-08-09
Payer: MEDICARE

## 2023-08-20 DIAGNOSIS — M48.062 SPINAL STENOSIS OF LUMBAR REGION WITH NEUROGENIC CLAUDICATION: ICD-10-CM

## 2023-08-21 RX ORDER — GABAPENTIN 300 MG/1
600 CAPSULE ORAL AT BEDTIME
Qty: 180 CAPSULE | Refills: 1 | Status: SHIPPED | OUTPATIENT
Start: 2023-08-21 | End: 2024-08-14

## 2023-08-28 NOTE — PROGRESS NOTES
Alma Michele is a 78 year old female with the following diagnoses:   1. Optic neuropathy    2. Blurred vision    3. Visual field defect         Patient was sent for consultation by Dr. Andrea Cruz for decreased vision right eye.    HPI:  Alma is a 78 year old female presenting with decreased vision in her right eye since she had ERM removal surgery on 12/6/2022. She describes it as blurry. 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 Does not worsen at any point during the day. Has not noticed any visual field defects and just notes the blurriness. Does not seem to be getting worse. No headaches, no scalp tenderness, no fever or weight loss. No new muscle aches or pains.    Independent historians:  Patient    Review of outside testing:  No outside testing.    Review of outside clinical notes:    Visit with Dr. Fili Cruz at [facility]:        Past medical history:    Patient Active Problem List   Diagnosis    Recurrent cold sores    Osteoporosis    Actinic keratosis    H/O bilateral breast implants    Bilateral epiphora    Insomnia    Underweight    Hyponatremia    Personal history of colonic polyps    Piriformis muscle pain    Muscle spasm       Medications:   alendronate  amLODIPine  botulinum toxin type A  calcium Tabs  cholecalciferol Caps  gabapentin  ibuprofen  Alexis 128 Oint  valACYclovir  zolpidem      Family history / social history:  Patient's family history includes Cerebrovascular Disease in her maternal grandfather; Chiari Malformation in her daughter; Colon Polyps in her sister; Heart Failure in her father and mother; Hypertension in her sister; No Known Problems in her brother and maternal grandmother.     Patient  reports that she has never smoked. She has never used smokeless tobacco. She reports current alcohol use of about 14.0 standard drinks of alcohol per week. She reports that she does not use  drugs.     Exam:  Visual acuity 20/50 ph right eye 20/20 left eye. Color vision 10/11 right eye and 10/11 left eye. Pupils - round and reactive, no afferent pupillary defect.  Intraocular pressure 9 right eye and 10 left eye.  Anterior segment exam was unremarkable.  Fundus exam revealed small yellow exudates bilaterally with normal-appearing optic nerves.  Strabismus exam was negative alternating cover test and cover-uncover test    Tests ordered and interpreted today:  Visual field   OPTICAL COHERENCE TOMOGRAPHY   Corneal Topography     Discussion of management / interpretation with another provider:   None    Assessment/Plan:   It is my impression that patient has decreased vision RIGHT eye following retinal surgery. She has a central scotoma, loss of ganglion cell layer, and borderline thinning of the retinal nerve fiber layer in the RIGHT eye. She is not 100% sure when the vision loss began but is convinced that it is worse now than preop.  She indicates that it was probably within a week of the surgery that she noted it but she also had gas in her eye following surgery.  I will obtain an MRI of the orbit.  If this is normal, then I think she may have these findings following her Epiretinal membrane surgery. This has been reported following Epiretinal membrane peeling (Invest Ophthalmol Vis Sci 2014 Sep 18;55(10):6756-64. doi: 10.1167/iovs.14-74467. 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  PMID: [id number]).      Patient has epiphora.  She was diagnosed by an outside clinic with nasolacrimal duct obstruction.  Could consider seeing oculoplastics versus someone closer to her home if it continues to be bothersome.  She will consider it.               Attending Physician Attestation:  Complete documentation of historical and exam elements from today's encounter can be  found in the full encounter summary report (not reduplicated in this progress note).  I personally obtained the chief complaint(s) and history of present illness.  I confirmed and edited as necessary the review of systems, past medical/surgical history, family history, social history, and examination findings as documented by others; and I examined the patient myself.  I personally reviewed the relevant tests, images, and reports as documented above.  I formulated and edited as necessary the assessment and plan and discussed the findings and management plan with the patient and family. I was present with the medical student who participated in the service and in the documentation of this note. - MD Ly Chapin  Medical Student, MS4

## 2023-08-29 ENCOUNTER — OFFICE VISIT (OUTPATIENT)
Dept: OPHTHALMOLOGY | Facility: CLINIC | Age: 78
End: 2023-08-29
Attending: OPHTHALMOLOGY
Payer: MEDICARE

## 2023-08-29 DIAGNOSIS — H46.9 OPTIC NEUROPATHY: Primary | ICD-10-CM

## 2023-08-29 DIAGNOSIS — H53.40 VISUAL FIELD DEFECT: ICD-10-CM

## 2023-08-29 DIAGNOSIS — H53.8 BLURRED VISION: ICD-10-CM

## 2023-08-29 DIAGNOSIS — H53.40 VISUAL FIELD DEFECT: Primary | ICD-10-CM

## 2023-08-29 PROCEDURE — 99204 OFFICE O/P NEW MOD 45 MIN: CPT | Performed by: OPHTHALMOLOGY

## 2023-08-29 PROCEDURE — 92133 CPTRZD OPH DX IMG PST SGM ON: CPT | Performed by: OPHTHALMOLOGY

## 2023-08-29 PROCEDURE — 92083 EXTENDED VISUAL FIELD XM: CPT | Performed by: OPHTHALMOLOGY

## 2023-08-29 PROCEDURE — 92025 CPTRIZED CORNEAL TOPOGRAPHY: CPT | Performed by: OPHTHALMOLOGY

## 2023-08-29 PROCEDURE — G0463 HOSPITAL OUTPT CLINIC VISIT: HCPCS | Performed by: OPHTHALMOLOGY

## 2023-08-29 ASSESSMENT — TONOMETRY
OS_IOP_MMHG: 10
IOP_METHOD: ICARE
OD_IOP_MMHG: 09

## 2023-08-29 ASSESSMENT — CONF VISUAL FIELD
OD_SUPERIOR_TEMPORAL_RESTRICTION: 0
OS_NORMAL: 1
METHOD: COUNTING FINGERS
OS_INFERIOR_NASAL_RESTRICTION: 0
OD_SUPERIOR_NASAL_RESTRICTION: 0
OD_NORMAL: 1
OS_SUPERIOR_NASAL_RESTRICTION: 0
OS_SUPERIOR_TEMPORAL_RESTRICTION: 0
OS_INFERIOR_TEMPORAL_RESTRICTION: 0
OD_INFERIOR_NASAL_RESTRICTION: 0
OD_INFERIOR_TEMPORAL_RESTRICTION: 0

## 2023-08-29 ASSESSMENT — EXTERNAL EXAM - LEFT EYE: OS_EXAM: NORMAL

## 2023-08-29 ASSESSMENT — VISUAL ACUITY
OS_SC: 20/20
OD_PH_SC: 20/50
OS_SC+: -1
OD_SC: 20/60
METHOD: SNELLEN - LINEAR

## 2023-08-29 ASSESSMENT — EXTERNAL EXAM - RIGHT EYE: OD_EXAM: NORMAL

## 2023-08-29 ASSESSMENT — CUP TO DISC RATIO
OD_RATIO: 0.35
OS_RATIO: 0.3

## 2023-08-29 ASSESSMENT — SLIT LAMP EXAM - LIDS
COMMENTS: NORMAL
COMMENTS: NORMAL

## 2023-08-29 NOTE — LETTER
2023         RE:  :  MRN: Alma Michele  1945  [id number]     Dear Dr. Cruz,    Thank you for asking me to see your very pleasant patient, Alma Michele, in neuro-ophthalmic consultation.  I would like to thank you for sending your records and I have summarized them in the history of present illness.  My assessment and plan are below.  For further details, please see my attached clinic note.      Alma Michele is a 78 year old female with the following diagnoses:   1. Optic neuropathy    2. Blurred vision    3. Visual field defect       Patient was sent for consultation by Dr. Andrea Cruz for decreased vision right eye.    HPI: Alma is a 78 year old female presenting with decreased vision in her right eye since she had ERM removal surgery on 2022. She describes it as blurry. 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 Does not worsen at any point during the day. Has not noticed any visual field defects and just notes the blurriness. Does not seem to be getting worse. No headaches, no scalp tenderness, no fever or weight loss. No new muscle aches or pains.    Independent historians:  Patient    Review of outside testing:  No outside testing.    Review of outside clinical notes:    Visit with Dr. Fili Cruz at [facility]:        Past medical history:  Patient Active Problem List   Diagnosis    Recurrent cold sores    Osteoporosis    Actinic keratosis    H/O bilateral breast implants    Bilateral epiphora    Insomnia    Underweight    Hyponatremia    Personal history of colonic polyps    Piriformis muscle pain    Muscle spasm     Medications:   alendronate  amLODIPine  botulinum toxin type A  calcium Tabs  cholecalciferol Caps  gabapentin  ibuprofen  Alexis 128 Oint  valACYclovir  zolpidem    Family history / social history: Patient's family history includes Cerebrovascular Disease in her maternal grandfather;  Chiari Malformation in her daughter; Colon Polyps in her sister; Heart Failure in her father and mother; Hypertension in her sister; No Known Problems in her brother and maternal grandmother.     Patient  reports that she has never smoked. She has never used smokeless tobacco. She reports current alcohol use of about 14.0 standard drinks of alcohol per week. She reports that she does not use drugs.     Exam: Visual acuity 20/50 ph right eye 20/20 left eye. Color vision 10/11 right eye and 10/11 left eye. Pupils - round and reactive, no afferent pupillary defect.  Intraocular pressure 9 right eye and 10 left eye.  Anterior segment exam was unremarkable.  Fundus exam revealed small yellow exudates bilaterally with normal-appearing optic nerves.  Strabismus exam was negative alternating cover test and cover-uncover test    Tests ordered and interpreted today:  Visual field   OPTICAL COHERENCE TOMOGRAPHY   Corneal Topography     Discussion of management / interpretation with another provider: None    Assessment/Plan: It is my impression that patient has decreased vision RIGHT eye following retinal surgery. She has a central scotoma, loss of ganglion cell layer, and borderline thinning of the retinal nerve fiber layer in the RIGHT eye. She is not 100% sure when the vision loss began but is convinced that it is worse now than preop.  She indicates that it was probably within a week of the surgery that she noted it but she also had gas in her eye following surgery.  I will obtain an MRI of the orbit.  If this is normal, then I think she may have these findings following her Epiretinal membrane surgery. This has been reported following Epiretinal membrane peeling (Invest Ophthalmol Vis Sci 2014 Sep 18;55(10):6756-64. doi: 10.1167/iovs.14-08421. 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  PMID: [id number]).      Patient has epiphora.  She was diagnosed by an outside clinic with nasolacrimal duct obstruction.  Could consider seeing oculoplastics versus someone closer to her home if it continues to be bothersome.  She will consider it.      Again, thank you for allowing me to participate in the care of your patient.      Sincerely,    Lauro Cruz MD  Professor  Ophthalmology Residency   Director of Neuro-Ophthalmology  [employer] Endowed Chair  Departments of Ophthalmology, Neurology, and Neurosurgery  [facility] [address] [place] [address]  [address]  T - [phone]  F - [phone]  CHELLE [email or web address]        CC: Andrea Cruz MD  [address] [place] [address]  Via In Basket     Gaudencio Farr MD  [facility] [address]  [address] [place] [address]  Via In Basket    DX = inner retinal optic neuropathy

## 2023-08-29 NOTE — NURSING NOTE
Chief Complaint(s) and History of Present Illness(es)       Vision Changes Ou    In right eye.  This started 8 months ago.  Charactertized as  blurred vision.  Severity is severe.  Occurring constantly.  Since onset it is gradually worsening.  Associated symptoms include Negative for eye pain, headache, flashes and floaters.  Pain was noted as 0/10. Additional comments: Alma Michele is a 78 year old female sent for consultation by Dr. Singh for blurry vision, right eye.    Alma reports decreased vision in the right eye following her retina surgery in Dec 2022.  No eye pain or discomfort.  Does have a plugged tear duct in the right eye.    SYD Greenberg 8/29/2023 8:31 AM

## 2023-08-29 NOTE — LETTER
2023         RE:  :  MRN: Alma Michele  1945  [id number]     Dear Dr. Andrea Cruz,    Thank you for asking me to see your very pleasant patient, Alma Michele, in neuro-ophthalmic consultation.  I would like to thank you for sending your records and I have summarized them in the history of present illness.  My assessment and plan are below.  For further details, please see my attached clinic note.      Alma Michele is a 78 year old female with the following diagnoses:   1. Optic neuropathy    2. Blurred vision    3. Visual field defect         Patient was sent for consultation by Dr. Andrea Cruz for decreased vision right eye.    HPI:  Alma is a 78 year old female presenting with decreased vision in her right eye since she had ERM removal surgery on 2022. She describes it as blurry. 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 Does not worsen at any point during the day. Has not noticed any visual field defects and just notes the blurriness. Does not seem to be getting worse. No headaches, no scalp tenderness, no fever or weight loss. No new muscle aches or pains.    Independent historians:  Patient    Review of outside testing:  No outside testing.    Review of outside clinical notes:    Visit with Dr. Fili Cruz at [facility]:        Past medical history:    Patient Active Problem List   Diagnosis    Recurrent cold sores    Osteoporosis    Actinic keratosis    H/O bilateral breast implants    Bilateral epiphora    Insomnia    Underweight    Hyponatremia    Personal history of colonic polyps    Piriformis muscle pain    Muscle spasm       Medications:   alendronate  amLODIPine  botulinum toxin type A  calcium Tabs  cholecalciferol Caps  gabapentin  ibuprofen  Alexis 128 Oint  valACYclovir  zolpidem      Family history / social history:  Patient's family history includes Cerebrovascular Disease in  her maternal grandfather; Chiari Malformation in her daughter; Colon Polyps in her sister; Heart Failure in her father and mother; Hypertension in her sister; No Known Problems in her brother and maternal grandmother.     Patient  reports that she has never smoked. She has never used smokeless tobacco. She reports current alcohol use of about 14.0 standard drinks of alcohol per week. She reports that she does not use drugs.     Exam:  Visual acuity 20/50 ph right eye 20/20 left eye. Color vision 10/11 right eye and 10/11 left eye. Pupils - round and reactive, no afferent pupillary defect.  Intraocular pressure 9 right eye and 10 left eye.  Anterior segment exam was unremarkable.  Fundus exam revealed small yellow exudates bilaterally with normal-appearing optic nerves.  Strabismus exam was negative alternating cover test and cover-uncover test    Tests ordered and interpreted today:  Visual field   OPTICAL COHERENCE TOMOGRAPHY   Corneal Topography     Discussion of management / interpretation with another provider:   None    Assessment/Plan:   It is my impression that patient has decreased vision RIGHT eye following retinal surgery. She has a central scotoma, loss of ganglion cell layer, and borderline thinning of the retinal nerve fiber layer in the RIGHT eye. She is not 100% sure when the vision loss began but is convinced that it is worse now than preop.  She indicates that it was probably within a week of the surgery that she noted it but she also had gas in her eye following surgery.  I will obtain an MRI of the orbit.  If this is normal, then I think she may have these findings following her Epiretinal membrane surgery. This has been reported following Epiretinal membrane peeling (Invest Ophthalmol Vis Sci 2014 Sep 18;55(10):6756-64. doi: 10.1167/iovs.14-57862. 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  PMID: [id number]).      Patient has epiphora.  She was diagnosed by an outside clinic with nasolacrimal duct obstruction.  Could consider seeing oculoplastics versus someone closer to her home if it continues to be bothersome.  She will consider it.              Again, thank you for allowing me to participate in the care of your patient.      Sincerely,    Lauro Cruz MD  Professor  Ophthalmology Residency   Director of Neuro-Ophthalmology  [employer] Endow Chair  Departments of Ophthalmology, Neurology, and Neurosurgery  [facility] [address]  [address] [place] [address]  [address]  T - [phone]  F - [phone]  CHELLE [email or web address]      CC: Andrea Crzu MD  [address] [place] [address]  Via In Basket     Gaudencio Farr MD  [facility] [address]  [address] [place] [address]  Via In Basket    DX = inner retinal optic neuropathy, Epiretinal membrane peeling

## 2023-08-30 ENCOUNTER — OFFICE VISIT (OUTPATIENT)
Dept: PHYSICAL MEDICINE AND REHAB | Facility: CLINIC | Age: 78
End: 2023-08-30
Payer: MEDICARE

## 2023-08-30 VITALS — DIASTOLIC BLOOD PRESSURE: 76 MMHG | SYSTOLIC BLOOD PRESSURE: 138 MMHG | HEART RATE: 83 BPM | OXYGEN SATURATION: 99 %

## 2023-08-30 DIAGNOSIS — M79.18 PIRIFORMIS MUSCLE PAIN: ICD-10-CM

## 2023-08-30 DIAGNOSIS — M62.838 SPASM OF RIGHT PIRIFORMIS MUSCLE: ICD-10-CM

## 2023-08-30 DIAGNOSIS — M62.838 MUSCLE SPASM: Primary | ICD-10-CM

## 2023-08-30 PROCEDURE — 64642 CHEMODENERV 1 EXTREMITY 1-4: CPT | Performed by: PHYSICAL MEDICINE & REHABILITATION

## 2023-08-30 PROCEDURE — 76942 ECHO GUIDE FOR BIOPSY: CPT | Performed by: PHYSICAL MEDICINE & REHABILITATION

## 2023-08-30 ASSESSMENT — PAIN SCALES - GENERAL: PAINLEVEL: SEVERE PAIN (7)

## 2023-08-30 NOTE — LETTER
8/30/2023       RE: Alma Michele  [address] [place] [address]       Dear Colleague,    Thank you for referring your patient, Alma Michele, to the [facility] [place] [facility]. Please see a copy of my visit note below.    PROCEDURE NOTE: Intramuscular Onabotulinum Toxin-A Injection Under Ultrasound Guidance    PROCEDURE DATE: 08/30/23    PATIENT NAME: Alma Michele  YOB: 1945    ATTENDING PHYSICIAN: Gary Renee MD  FELLOW/RESIDENT PHYSICIAN: None     PREOPERATIVE DIAGNOSIS:   1. Muscle spasm    2. Spasm of right piriformis muscle    3. Piriformis muscle pain    POSTOPERATIVE DIAGNOSIS: same    PROCEDURE PERFORMED: Right Piriformis and Obturator Internus Onabotulinum Toxin-A Muscle Injection Under Ultrasound Guidance    ULTRASOUND WAS USED.    INDICATIONS FOR THE PROCEDURE:  Alma Michele is a 78 year old female who presents with piriformis muscle pain and spasm.     PROCEDURE AND FINDINGS:  She was greeted in the clinic. The risk, benefits and alternatives to the procedure were again reviewed with her and informed consent was obtained and the patient agreed to proceed. A time-out was performed. Following review alternatives, benefits and risks, the procedure was carried out under sterile prep with sterile gel. The use of direct sonographic guidance was used to ensure accurate placement of the needle (rather than non-guided injection) and required to minimize the risk of bleeding or injury to nearby neurovascular structures. Images were recorded and stored.     A 5-1MHz ultrasound transducer was used to visualize the relevant structures and determine the optimal needle path for the procedure.  2mL 1% lidocaine was infiltrated at the needle insertion site subcutaneously. Then, a 22 gauge 5-inch Quincke spinal needle was advanced from lateral to medial utilizing an in-plane approach in long  axis, under continuous ultrasound guidance to the piriformis muscle and obturator internus on the right. After negative aspiration, slow injection of the treatment solution 2mL total consisting of 100 units onabotulinum toxin (Botox) reconstituted in 2mL preservative free normal saline was instilled into affected area (50 units per muscle). The tip of the needle was visualized throughout the procedure. The remainder of the single-use vials were discarded.      She tolerated the procedure well, was discharged home in stable condition.     Follow-up will be in clinic or x3 months for repeat Botox injection.     COMPLICATIONS: None    COMMENTS: Patient had more concordant pain with sono palpation over the piriformis and obturator internus muscles, therefore the treatment solution was divided evenly between the 2.  Today, she does not notice significant from her from her initial Botox injection.  She will update me x3 to 4 weeks on her response.  We can consider additional imaging with MRI MSK pelvis if not already completed.        Again, thank you for allowing me to participate in the care of your patient.      Sincerely,    Gary Renee MD

## 2023-08-30 NOTE — PROGRESS NOTES
PROCEDURE NOTE: Intramuscular Onabotulinum Toxin-A Injection Under Ultrasound Guidance    PROCEDURE DATE: 08/30/23    PATIENT NAME: Alma Michele  YOB: 1945    ATTENDING PHYSICIAN: Gary Renee MD  FELLOW/RESIDENT PHYSICIAN: None     PREOPERATIVE DIAGNOSIS:   1. Muscle spasm    2. Spasm of right piriformis muscle    3. Piriformis muscle pain    POSTOPERATIVE DIAGNOSIS: same    PROCEDURE PERFORMED: Right Piriformis and Obturator Internus Onabotulinum Toxin-A Muscle Injection Under Ultrasound Guidance    ULTRASOUND WAS USED.    INDICATIONS FOR THE PROCEDURE:  Amla Michele is a 78 year old female who presents with piriformis muscle pain and spasm.     PROCEDURE AND FINDINGS:  She was greeted in the clinic. The risk, benefits and alternatives to the procedure were again reviewed with her and informed consent was obtained and the patient agreed to proceed. A time-out was performed. Following review alternatives, benefits and risks, the procedure was carried out under sterile prep with sterile gel. The use of direct sonographic guidance was used to ensure accurate placement of the needle (rather than non-guided injection) and required to minimize the risk of bleeding or injury to nearby neurovascular structures. Images were recorded and stored.     A 5-1MHz ultrasound transducer was used to visualize the relevant structures and determine the optimal needle path for the procedure.  2mL 1% lidocaine was infiltrated at the needle insertion site subcutaneously. Then, a 22 gauge 5-inch Quincke spinal needle was advanced from lateral to medial utilizing an in-plane approach in long axis, under continuous ultrasound guidance to the piriformis muscle and obturator internus on the right. After negative aspiration, slow injection of the treatment solution 2mL total consisting of 100 units onabotulinum toxin (Botox) reconstituted in 2mL preservative free normal saline was instilled into affected area (50 units per  muscle). The tip of the needle was visualized throughout the procedure. The remainder of the single-use vials were discarded.      She tolerated the procedure well, was discharged home in stable condition.     Follow-up will be in clinic or x3 months for repeat Botox injection.     COMPLICATIONS: None    COMMENTS: Patient had more concordant pain with sono palpation over the piriformis and obturator internus muscles, therefore the treatment solution was divided evenly between the 2.  Today, she does not notice significant from her from her initial Botox injection.  She will update me x3 to 4 weeks on her response.  We can consider additional imaging with MRI MSK pelvis if not already completed.

## 2023-08-30 NOTE — NURSING NOTE
Chief Complaint   Patient presents with    RECHECK     Botox injections confirmed with patient     Annie Joel CMA at 4:16 PM on 8/30/2023.

## 2023-08-31 ENCOUNTER — INFUSION THERAPY VISIT (OUTPATIENT)
Dept: INFUSION THERAPY | Facility: CLINIC | Age: 78
End: 2023-08-31
Attending: ORTHOPAEDIC SURGERY
Payer: MEDICARE

## 2023-08-31 VITALS
HEIGHT: 64 IN | WEIGHT: 103 LBS | RESPIRATION RATE: 16 BRPM | SYSTOLIC BLOOD PRESSURE: 143 MMHG | BODY MASS INDEX: 17.58 KG/M2 | HEART RATE: 67 BPM | DIASTOLIC BLOOD PRESSURE: 82 MMHG | TEMPERATURE: 97.9 F

## 2023-08-31 DIAGNOSIS — M81.0 OSTEOPOROSIS, UNSPECIFIED OSTEOPOROSIS TYPE, UNSPECIFIED PATHOLOGICAL FRACTURE PRESENCE: Primary | ICD-10-CM

## 2023-08-31 PROCEDURE — 250N000011 HC RX IP 250 OP 636: Mod: JZ | Performed by: INTERNAL MEDICINE

## 2023-08-31 PROCEDURE — 96365 THER/PROPH/DIAG IV INF INIT: CPT

## 2023-08-31 RX ORDER — METHYLPREDNISOLONE SODIUM SUCCINATE 125 MG/2ML
125 INJECTION, POWDER, LYOPHILIZED, FOR SOLUTION INTRAMUSCULAR; INTRAVENOUS
Status: CANCELLED
Start: 2023-08-31

## 2023-08-31 RX ORDER — HEPARIN SODIUM,PORCINE 10 UNIT/ML
5-20 VIAL (ML) INTRAVENOUS DAILY PRN
Status: CANCELLED | OUTPATIENT
Start: 2023-08-31

## 2023-08-31 RX ORDER — ALBUTEROL SULFATE 90 UG/1
1-2 AEROSOL, METERED RESPIRATORY (INHALATION)
Status: CANCELLED
Start: 2023-08-31

## 2023-08-31 RX ORDER — ALBUTEROL SULFATE 0.83 MG/ML
2.5 SOLUTION RESPIRATORY (INHALATION)
Status: CANCELLED | OUTPATIENT
Start: 2023-08-31

## 2023-08-31 RX ORDER — ZOLEDRONIC ACID 5 MG/100ML
5 INJECTION, SOLUTION INTRAVENOUS ONCE
Status: CANCELLED
Start: 2023-08-31

## 2023-08-31 RX ORDER — HEPARIN SODIUM (PORCINE) LOCK FLUSH IV SOLN 100 UNIT/ML 100 UNIT/ML
5 SOLUTION INTRAVENOUS
Status: DISCONTINUED | OUTPATIENT
Start: 2023-08-31 | End: 2023-08-31 | Stop reason: HOSPADM

## 2023-08-31 RX ORDER — HEPARIN SODIUM (PORCINE) LOCK FLUSH IV SOLN 100 UNIT/ML 100 UNIT/ML
5 SOLUTION INTRAVENOUS
Status: CANCELLED | OUTPATIENT
Start: 2023-08-31

## 2023-08-31 RX ORDER — ACETAMINOPHEN 325 MG/1
650 TABLET ORAL
Status: CANCELLED | OUTPATIENT
Start: 2023-08-31

## 2023-08-31 RX ORDER — DIPHENHYDRAMINE HYDROCHLORIDE 50 MG/ML
50 INJECTION INTRAMUSCULAR; INTRAVENOUS
Status: CANCELLED
Start: 2023-08-31

## 2023-08-31 RX ORDER — EPINEPHRINE 1 MG/ML
0.3 INJECTION, SOLUTION INTRAMUSCULAR; SUBCUTANEOUS EVERY 5 MIN PRN
Status: CANCELLED | OUTPATIENT
Start: 2023-08-31

## 2023-08-31 RX ORDER — ZOLEDRONIC ACID 5 MG/100ML
5 INJECTION, SOLUTION INTRAVENOUS ONCE
Status: COMPLETED | OUTPATIENT
Start: 2023-08-31 | End: 2023-08-31

## 2023-08-31 RX ORDER — ACETAMINOPHEN 325 MG/1
650 TABLET ORAL
Status: DISCONTINUED | OUTPATIENT
Start: 2023-08-31 | End: 2023-08-31 | Stop reason: HOSPADM

## 2023-08-31 RX ADMIN — ZOLEDRONIC ACID 5 MG: 0.05 INJECTION, SOLUTION INTRAVENOUS at 15:57

## 2023-08-31 NOTE — PROGRESS NOTES
"Infusion Nursing Note:  Alma Michele presents today for Reclast.    Patient seen by provider today: No   present during visit today: Not Applicable.    Note: Patient identification verified by name and date of birth.  Assessment completed.  Vitals recorded in Doc Flowsheets.  Patient was provided with education regarding medication/procedure and possible side effects.  Patient verbalized understanding.    During [facility]'s [facility] appointment, orders from Denilson Blunt MD* were completed.  Frequency: every year*. Pre meds none per orders/patient request.  .      Intravenous Access:  Peripheral IV placed.    Treatment Conditions:  Results reviewed, labs MET treatment parameters, ok to proceed with treatment.  Administrations This Visit       zoledronic Acid (RECLAST) infusion 5 mg       Admin Date  08/31/2023 Action  $New Bag Dose  5 mg Rate  200 mL/hr Route  Intravenous Administered By  Ana Lilia Alvarez RN                      Post Infusion Assessment:  Patient tolerated infusion without incident.       Discharge Plan:   Discharge instructions reviewed with: Patient.  Patient and/or family verbalized understanding of discharge instructions and all questions answered.  Patient discharged in stable condition accompanied by: self.  Departure Mode: Ambulatory.    BP (!) 143/82 (BP Location: Right arm, Patient Position: Sitting, Cuff Size: Adult Small)   Pulse 67   Temp 97.9  F (36.6  C) (Oral)   Resp 16   Ht 1.626 m (5' 4\")   Wt 46.7 kg (103 lb)   BMI 17.68 kg/m        Ana Lilia Alvarez RN  "

## 2023-09-11 ENCOUNTER — TELEPHONE (OUTPATIENT)
Dept: INTERNAL MEDICINE | Facility: CLINIC | Age: 78
End: 2023-09-11
Payer: COMMERCIAL

## 2023-09-11 NOTE — TELEPHONE ENCOUNTER
Patient has concerns about Amlodipine BID     This AM BP was 91/52 this was before taking Amlodipine    Usually patient takes Amlodipine no matter what BID, does not take BP before taking meds    Patient was sweaty, felt she was going to pass out. Daughter is a nurse she had her sit down and drink fluids    Patients BP while on the phone with RN is 123/78 this is about 4 1/2 hours after patient had fainting like spell with low BP.    Patient wondering if her BP med should be hanged or parameters placed on it for taking.    FARIDEH Lynn  [facility]

## 2023-09-11 NOTE — TELEPHONE ENCOUNTER
Reviewing Dr. Cruz's last note, it looks like he suggested that she take the amlodipine 2.5 mg once per day.  Dr. Cruz wants her blood pressure consistently less than 130/85, but not so low that she is experiencing lightheaded dizzy spells.

## 2023-09-13 ENCOUNTER — HOSPITAL ENCOUNTER (OUTPATIENT)
Dept: MRI IMAGING | Facility: CLINIC | Age: 78
Discharge: HOME | End: 2023-09-13
Attending: OPHTHALMOLOGY | Admitting: OPHTHALMOLOGY
Payer: MEDICARE

## 2023-09-13 DIAGNOSIS — H46.9 OPTIC NEUROPATHY: ICD-10-CM

## 2023-09-13 DIAGNOSIS — H53.8 BLURRED VISION: ICD-10-CM

## 2023-09-13 DIAGNOSIS — H53.40 VISUAL FIELD DEFECT: ICD-10-CM

## 2023-09-13 PROCEDURE — 255N000002 HC RX 255 OP 636: Mod: JZ | Performed by: OPHTHALMOLOGY

## 2023-09-13 PROCEDURE — A9585 GADOBUTROL INJECTION: HCPCS | Mod: JZ | Performed by: OPHTHALMOLOGY

## 2023-09-13 PROCEDURE — 70543 MRI ORBT/FAC/NCK W/O &W/DYE: CPT | Mod: MG

## 2023-09-13 RX ORDER — GADOBUTROL 604.72 MG/ML
5 INJECTION INTRAVENOUS ONCE
Status: COMPLETED | OUTPATIENT
Start: 2023-09-13 | End: 2023-09-13

## 2023-09-13 RX ADMIN — GADOBUTROL 5 ML: 604.72 INJECTION INTRAVENOUS at 07:25

## 2023-09-20 ENCOUNTER — TRANSFERRED RECORDS (OUTPATIENT)
Dept: HEALTH INFORMATION MANAGEMENT | Facility: CLINIC | Age: 78
End: 2023-09-20
Payer: COMMERCIAL

## 2023-09-26 ENCOUNTER — OFFICE VISIT (OUTPATIENT)
Dept: FAMILY MEDICINE | Facility: CLINIC | Age: 78
End: 2023-09-26
Payer: MEDICARE

## 2023-09-26 ENCOUNTER — NURSE TRIAGE (OUTPATIENT)
Dept: INTERNAL MEDICINE | Facility: CLINIC | Age: 78
End: 2023-09-26

## 2023-09-26 VITALS
BODY MASS INDEX: 17.24 KG/M2 | HEART RATE: 74 BPM | HEIGHT: 64 IN | DIASTOLIC BLOOD PRESSURE: 75 MMHG | OXYGEN SATURATION: 100 % | SYSTOLIC BLOOD PRESSURE: 116 MMHG | TEMPERATURE: 97.9 F | RESPIRATION RATE: 15 BRPM | WEIGHT: 101 LBS

## 2023-09-26 DIAGNOSIS — G47.00 INSOMNIA, UNSPECIFIED TYPE: ICD-10-CM

## 2023-09-26 DIAGNOSIS — B00.1 RECURRENT COLD SORES: ICD-10-CM

## 2023-09-26 DIAGNOSIS — I95.9 HYPOTENSION, UNSPECIFIED HYPOTENSION TYPE: Primary | ICD-10-CM

## 2023-09-26 LAB
ANION GAP SERPL CALCULATED.3IONS-SCNC: 10 MMOL/L (ref 7–15)
ATRIAL RATE - MUSE: 72 BPM
BUN SERPL-MCNC: 13 MG/DL (ref 8–23)
CALCIUM SERPL-MCNC: 9.2 MG/DL (ref 8.8–10.2)
CHLORIDE SERPL-SCNC: 98 MMOL/L (ref 98–107)
CREAT SERPL-MCNC: 0.6 MG/DL (ref 0.51–0.95)
DEPRECATED HCO3 PLAS-SCNC: 24 MMOL/L (ref 22–29)
DIASTOLIC BLOOD PRESSURE - MUSE: NORMAL MMHG
EGFRCR SERPLBLD CKD-EPI 2021: >90 ML/MIN/1.73M2
GLUCOSE SERPL-MCNC: 91 MG/DL (ref 70–99)
INTERPRETATION ECG - MUSE: NORMAL
P AXIS - MUSE: 74 DEGREES
POTASSIUM SERPL-SCNC: 4.5 MMOL/L (ref 3.4–5.3)
PR INTERVAL - MUSE: 150 MS
QRS DURATION - MUSE: 64 MS
QT - MUSE: 378 MS
QTC - MUSE: 413 MS
R AXIS - MUSE: 9 DEGREES
SODIUM SERPL-SCNC: 132 MMOL/L (ref 135–145)
SYSTOLIC BLOOD PRESSURE - MUSE: NORMAL MMHG
T AXIS - MUSE: 48 DEGREES
VENTRICULAR RATE- MUSE: 72 BPM

## 2023-09-26 PROCEDURE — 93005 ELECTROCARDIOGRAM TRACING: CPT | Performed by: NURSE PRACTITIONER

## 2023-09-26 PROCEDURE — 80048 BASIC METABOLIC PNL TOTAL CA: CPT | Performed by: NURSE PRACTITIONER

## 2023-09-26 PROCEDURE — 99214 OFFICE O/P EST MOD 30 MIN: CPT | Mod: 25 | Performed by: NURSE PRACTITIONER

## 2023-09-26 PROCEDURE — 93010 ELECTROCARDIOGRAM REPORT: CPT | Mod: OFF | Performed by: INTERNAL MEDICINE

## 2023-09-26 PROCEDURE — 36415 COLL VENOUS BLD VENIPUNCTURE: CPT | Performed by: NURSE PRACTITIONER

## 2023-09-26 RX ORDER — VALACYCLOVIR HYDROCHLORIDE 500 MG/1
500 TABLET, FILM COATED ORAL 2 TIMES DAILY PRN
Qty: 20 TABLET | Refills: 1 | Status: SHIPPED | OUTPATIENT
Start: 2023-09-26

## 2023-09-26 RX ORDER — ZOLPIDEM TARTRATE 10 MG/1
5 TABLET ORAL
Qty: 45 TABLET | Refills: 0 | Status: SHIPPED | OUTPATIENT
Start: 2023-09-26 | End: 2023-12-22

## 2023-09-26 NOTE — PATIENT INSTRUCTIONS
Hold amlodopine for now due to low blood pressures.  Let me know how blood pressures are doing. We may need to resume at once per day. We can consider a different blood pressure medication but this may also cause Blood Pressure to decreased.

## 2023-09-26 NOTE — TELEPHONE ENCOUNTER
Nurse Triage SBAR    Is this a 2nd Level Triage? YES, LICENSED PRACTITIONER REVIEW IS REQUIRED    Situation: patient calling with low BP reading this am of 58/?.    Background: patient notes she had a low BP reading this am, patient has taken BP twice since with readings of 109/79 and 113/81.    Assessment: Patient states her BP readings are usually higher, previous was 143/82. Patient notes her HR is also elevated at 119 and 112. Patient states she is not dizzy or weak at this time, denies any chest pain.Recommended patient be seen, patient states she is going to Physical Therapy and will be seen directly after. Unsure if Physical Therapy will see patient.    Protocol Recommended Disposition:   Go To Office Now    Recommendation: patient scheduled for soonest office visit available, with in 1 hour.      Patient scheduled for office visit.    Does the patient meet one of the following criteria for ADS visit consideration? No    Reason for Disposition   Systolic BP < 90 and NOT dizzy, lightheaded or weak    Additional Information   Negative: Systolic BP < 90 and feeling dizzy, lightheaded, or weak   Negative: Started suddenly after an allergic medicine, an allergic food, or bee sting   Negative: Shock suspected (e.g., cold/pale/clammy skin, too weak to stand, low BP, rapid pulse)   Negative: Difficult to awaken or acting confused (e.g., disoriented, slurred speech)   Negative: Fainted   Negative: Chest pain   Negative: Bleeding (e.g., vomiting blood, rectal bleeding or tarry stools, severe vaginal bleeding)   Negative: Extra heartbeats, irregular heart beating, or heart is beating very fast (i.e., 'palpitations')   Negative: Sounds like a life-threatening emergency to the triager   Negative: Systolic BP < 80 and NOT dizzy, lightheaded or weak (feels normal)   Negative: Abdominal pain   Negative: Major surgery in the past month   Negative: Fever > 100.4 F (38.0 C)   Negative: Drinking very little and dehydration  suspected (e.g., no urine > 12 hours, very dry mouth, very lightheaded)   Negative: Fall in systolic BP > 20 mm Hg from normal and feeling dizzy, lightheaded, or weak   Negative: Patient sounds very sick or weak to the triager    Protocols used: Blood Pressure - Low-A-OH

## 2023-09-26 NOTE — PROGRESS NOTES
Assessment & Plan     Hypotension, unspecified hypotension type  This does sound like vasovagal episode and may not improve with stopping amlodipine.  Make sure to keep well-hydrated.  Eat to avoid low blood sugars.  Sit down at first sign of dizziness.  Elevate legs.    We will stop amlodipine for now.  Discussed would like blood pressure less than 135/80.  We may need to restart the amlodipine at some point.  We could try a different medication however this may also cause blood pressures to go too low.    - Basic metabolic panel; Future  - EKG 12-lead, tracing only  - Basic metabolic panel  Recurrent cold sores  Refilling for patient   - valACYclovir (VALTREX) 500 MG tablet; Take 1 tablet (500 mg) by mouth 2 times daily as needed (In case of cold sore recurrence, take for 3 days, start as soon as symptoms appear)    Insomnia, unspecified type  Refilling for patient.  She reports she does take this every night and does not seem to be related to the low blood pressures.   - zolpidem (AMBIEN) 10 MG tablet; Take 0.5 tablets (5 mg) by mouth nightly as needed                     [facility]    Luis Marquez is a 78 year old, presenting for the following health issues:  Hypotension (Low bp/ high pulse)      9/26/2023    11:18 AM   Additional Questions   Roomed by Ximena W     Was standing at kitchen table and peeling peach.    Blood pressure dropped to 58/something-BP increased before coming here. This is the 3rd time patient has had blood pressure drop significantly like this.  -1st time was standing in shower  -2nd time peeling peach, again today peeling peach.  Had to have bowel movement right when had symptoms.     Patient reports BP crashes in the AM.    Is talking amlodipine once in the evening.     No changes to medications. Increased amount of water overnight.     Patient to have PRP injection in a couple of weeks.     HPI               Review of  "Systems         Objective    /75 (BP Location: Right arm, Patient Position: Sitting, Cuff Size: Adult Regular)   Pulse 74   Temp 97.9  F (36.6  C) (Oral)   Resp 15   Ht 1.626 m (5' 4.02\")   Wt 45.8 kg (101 lb)   SpO2 100%   BMI 17.33 kg/m    Body mass index is 17.33 kg/m .  Physical Exam  Constitutional:       Appearance: Normal appearance.   Cardiovascular:      Rate and Rhythm: Normal rate and regular rhythm.   Pulmonary:      Effort: Pulmonary effort is normal.      Breath sounds: Normal breath sounds.   Neurological:      General: No focal deficit present.      Mental Status: She is alert and oriented to person, place, and time.   Psychiatric:         Mood and Affect: Mood normal.         Behavior: Behavior normal.                              "

## 2023-10-10 ENCOUNTER — TRANSFERRED RECORDS (OUTPATIENT)
Dept: HEALTH INFORMATION MANAGEMENT | Facility: CLINIC | Age: 78
End: 2023-10-10
Payer: COMMERCIAL

## 2023-10-12 ENCOUNTER — MYC MEDICAL ADVICE (OUTPATIENT)
Dept: INTERNAL MEDICINE | Facility: CLINIC | Age: 78
End: 2023-10-12
Payer: COMMERCIAL

## 2023-10-12 ENCOUNTER — NURSE TRIAGE (OUTPATIENT)
Dept: INTERNAL MEDICINE | Facility: CLINIC | Age: 78
End: 2023-10-12

## 2023-10-12 NOTE — TELEPHONE ENCOUNTER
Patient calling back per message below.  Patient said she is on crutches and not moving quickly.  Patient requesting a call back.   Phone:  [phone]  Thank you.

## 2023-10-12 NOTE — TELEPHONE ENCOUNTER
"Nurse Triage SBAR    Is this a 2nd Level Triage? NO    Situation: Pt experiencing low BP. Patient was seen in Urgency room yesterday, and they told her to follow up with PCP.     Background: Pt was seen 9/26 after episode of fainting and low BP. Medication change- Not taking Amlodipine.     Assessment:  Pt should should be seen by PCP to discuss continued low BP.     Protocol Recommended Disposition:   See Within 3 Days In Office     Recommendation: Appointment was scheduled for pt.  On 10/13/23.    Routed to provider    Does the patient meet one of the following criteria for ADS visit consideration? No     Reason for Disposition   Brief dizziness or lightheadedness after standing up or eating    Additional Information   Negative: Chest pain   Negative: Extra heart beats or heart is beating fast  (i.e., \"palpitations\")   Negative: Sounds like a life-threatening emergency to the triager   Negative: Fainted   Negative: Difficult to awaken or acting confused  (e.g., disoriented, slurred speech)   Negative: Shock suspected (e.g., cold/pale/clammy skin, too weak to stand, low BP, rapid pulse)   Negative: Systolic BP < 80 and NOT dizzy, lightheaded or weak (feels normal)   Negative: Systolic BP < 90 and feeling dizzy, lightheaded, or weak   Negative: Started suddenly after an allergic medicine, an allergic food, or bee sting   Negative: Bleeding (e.g., vomiting blood, rectal bleeding or tarry stools, severe vaginal bleeding)   Negative: Abdominal pain   Negative: Major surgery in the past month   Negative: Fever > 100.4 F (38.0 C)   Negative: Drinking very little and has signs of dehydration (e.g., no urine > 12 hours, very dry mouth, very lightheaded)   Negative: Systolic BP < 90 and NOT dizzy, lightheaded or weak   Negative: Systolic BP  while taking blood pressure medications and NOT dizzy, lightheaded or weak   Negative: Fall in systolic BP > 20 mm Hg from normal and feeling dizzy, lightheaded, or weak   " "Negative: Patient sounds very sick or weak to the triager    Answer Assessment - Initial Assessment Questions  1. BLOOD PRESSURE: \"What is the blood pressure?\" \"Did you take at least two measurements 5 minutes apart?\"      167/90  2. ONSET: \"When did you take your blood pressure?\"      10:00  3. HOW: \"How did you obtain the blood pressure?\" (e.g., visiting nurse, automatic home BP monitor)      BP automatic machine  4. HISTORY: \"Do you have a history of low blood pressure?\" \"What is your blood pressure normally?\"      yes  5. MEDICATIONS: \"Are you taking any medications for blood pressure?\" If yes: \"Have they been changed recently?\"      Yes, last episode she stopped taking the Amlodipine.    Protocols used: Low Blood Pressure-A-OH    "

## 2023-10-12 NOTE — TELEPHONE ENCOUNTER
General Call    Contacts         Type Contact Phone/Fax    10/12/2023 09:32 AM CDT Phone (Incoming) Alma Michele (Self) [phone] (H)          Reason for Call:  Patient calling to speak to a nurse, waited on nurse line and no one picked up and did not want to keep pt waiting. Pt is aware she will get a call back.    Pt calling to report BP readings the past few days. She is concerned.    Tuesday BP readin/92  Yesterday BP readin/38  Today BP readin/90.    Currently experiencing no symptoms.    Please call pt back to triage.    Could we send this information to you in [employer] or would you prefer to receive a phone call?:   Patient would prefer a phone call   Okay to leave a detailed message?: No at Home number on file [phone] ([place])

## 2023-10-12 NOTE — TELEPHONE ENCOUNTER
Left message for patient to return call  for follow up of blood pressure concerns. Please route call to Nick RN pool.

## 2023-10-13 ENCOUNTER — ALLIED HEALTH/NURSE VISIT (OUTPATIENT)
Dept: FAMILY MEDICINE | Facility: CLINIC | Age: 78
End: 2023-10-13
Payer: MEDICARE

## 2023-10-13 ENCOUNTER — OFFICE VISIT (OUTPATIENT)
Dept: INTERNAL MEDICINE | Facility: CLINIC | Age: 78
End: 2023-10-13
Payer: MEDICARE

## 2023-10-13 VITALS
SYSTOLIC BLOOD PRESSURE: 108 MMHG | OXYGEN SATURATION: 97 % | HEIGHT: 64 IN | WEIGHT: 101 LBS | BODY MASS INDEX: 17.24 KG/M2 | DIASTOLIC BLOOD PRESSURE: 72 MMHG | HEART RATE: 75 BPM | RESPIRATION RATE: 16 BRPM | TEMPERATURE: 98.6 F

## 2023-10-13 VITALS — HEART RATE: 68 BPM | SYSTOLIC BLOOD PRESSURE: 132 MMHG | DIASTOLIC BLOOD PRESSURE: 82 MMHG

## 2023-10-13 DIAGNOSIS — R55 POSTURAL DIZZINESS WITH PRESYNCOPE: Primary | ICD-10-CM

## 2023-10-13 DIAGNOSIS — Z01.30 BLOOD PRESSURE CHECK: Primary | ICD-10-CM

## 2023-10-13 DIAGNOSIS — R42 POSTURAL DIZZINESS WITH PRESYNCOPE: Primary | ICD-10-CM

## 2023-10-13 PROCEDURE — 99207 PR NO CHARGE NURSE ONLY: CPT

## 2023-10-13 PROCEDURE — 99214 OFFICE O/P EST MOD 30 MIN: CPT | Performed by: INTERNAL MEDICINE

## 2023-10-13 NOTE — PATIENT INSTRUCTIONS
Follow-up after visit to the [facility] Urgency Room October 11, 2023 because of    Hypotension, for which I agree with completely stopping the small dose of amlodipine 2.5 mg a day, that she had been taking (I had reduced her dose earlier in 2023), and I think other contributors to low blood pressure are inadequate hydration, may be some aspect of muscular deconditioning, but I do not think there is any cardiac problem here, nor do I think she has adrenal insufficiency.    I reviewed the laboratory work done at the Urgency room, which showed a mildly reduced sodium concentration 134, and a slightly elevated BUN to creatinine ratio, and her urine was also concentrated with a specific gravity of 0.025.  Together the suggest dehydration.    Although Alma says that she stays hydrated, her  actually chimes in here this morning and says that she does not drink enough liquid.    I told Pavan that I want her to drink at least 3 L of liquid in a day, and allow her to self additional salt content in her diet.  She told me she ate Posten meatballs last night, and that sounds good.  I would encourage frequent meals.    At the moment I am not pursuing any gastroenterology work-up, until unless I am convinced that we are facing a problem with early satiety, or swallowing or malabsorption problems.  She did have a tissue transglutaminase antibody (test for celiac disease) that was - August 3, 2023.    She did have an electrocardiogram done in September 2023 that showed low voltages, sinus bradycardia, but otherwise normal.    She had laboratory work done in August 2023 showing normal thyroid function, pretty much normal comprehensive metabolic panel, with normal bicarbonate and potassium levels (therefore not suspecting adrenal insufficiency).    If despite the hydration and nutrition strategy Alma is still experiencing low blood pressures, then my next step of investigation would be to check her adrenal function with a  morning cortisol and ACTH level, possibly a Cortrosyn stimulation test.  I am not seeing any signs of any neurological conditions such as Parkinson's disease which can have dysautonomia as a manifestation.    She has a home blood pressure machine, and I asked her to bring it to a clinic visit, either here or could even be one of her especially consultation visits, to make sure that machine is accurate by doing a [id number] comparison against a manual reading    We will also get 30-day cardiac event recorder, to look for any signs of rhythm disturbance.  9-  Sinus rhythm   Low voltage QRS   Borderline ECG   No previous ECGs available   Confirmed by JEANNA FROST MD LOC:[facility] ([id number]) on 9/26/2023 4:09:59 PM     Underwent injection of platelet rich plasma into the right gluteal tendon, for diagnosis of gluteal tendinopathy  Done at [facility] on October 10, 2023  She is getting around with crutches, and was told that it may take several weeks to notice symptomatic benefit.    Essential hypertension--but the problem as of October 2023 has been low blood pressure, amlodipine stopped    Underweight, body mass index around 18    Wt Readings from Last 5 Encounters:   10/13/23 45.8 kg (101 lb)   09/26/23 45.8 kg (101 lb)   08/31/23 46.7 kg (103 lb)   08/03/23 47.2 kg (104 lb)   05/02/23 45.4 kg (100 lb)     Bilateral earwax, despite her using over-the-counter peroxide eardrops, will send her to ENT for manual clear out, and once that is done, I want her to use wax dissolving eardrops every week, maybe even twice a week to keep the ears clear.     Successful retina surgery December 6, 2022 for macular pucker  She will be departing for the [facility] after New Year's Day    Spinal canal stenosis  Right L5 transforaminal epidural steroid injection July 14, 2022  Lumbar spine MRI June 16, 2022  Moderate lumbar spondylosis with broad-based disc herniations evident throughout the lumbar  spine.  Disc bulging and facet degenerative changes resulting in severe spinal canal stenosis and lateral recess narrowing at L4-5 and moderate to severe spinal canal stenosis at L3-4.     Takes gabapentin 600 mg at bedtime (2 capsules of 300 mg each)     Right side piriformis syndrome.  After she had consultation with Dr. Sage Pickett at the , she was given a diagnosis of piriformis syndrome, and got an injection done at the  in January 2023 which was helpful  And since then has been doing physical therapy twice a week     But still bothered by pain with ambulation, so she is [facility] to consider Botox injection to the chronically contracted muscles     Underwent sacroiliac joint injection on the right side October 14, 2022    Osteoporosis for which she has been on alendronate for approximately 4 years, stopping in 2019, restarted June 2021  Switch to Reclast infusion (zoledronic acid) administered August 31, 2023, which plan to give that for 2 to 3 years, then drug holiday.  Under supervision of Dr. Gallardo (endocrinology)    [facility] endocrinology had recommended her to continue alendronate through 2020 to complete 5 years.  However she decided to stop it in 2019.  DEXA scan was May 2019 done at [facility] with a femoral neck T score of -2.5, lumbar spine T score -2.2.     Fariba 3 2021  1. The spine bone density L1-L2 with T-score -2.4.  2. Femoral bone densities show left total hip T- score -2.9 and right total hip T-score -2.7.  3. Trabecular bone score indicates poor trabecular bone architecture.     I reminded her about the importance of staying on her calcium and vitamin D supplements. She is no history of fractures.  She stays physically active.  Her gait is stable.    Gravelly voice  She has no pain in her throat or neck, and I do not palpate any abnormalities.  I told her 1 possibilities of vocal cord polyps, although she does not strain her vocal cords.  She has never had a  smoking habit.  I told her that if she wants to pursue evaluation, that would be an otolaryngology consultation, which would include fiberoptic laryngoscopy.  She will let me know if she wants to proceed with that.     Bilateral epiphora, takes doxycycline low-dose 50 mg once a day, monitored at [place] [facility], Status post bilateral intraocular implants.       Earwax, I suggested to use an over-the-counter wax dissolving eardrop kit, example brand Debrox or Murine    Mild alopecia and spider veins      Insomnia for which he uses every day Ambien, but I really want her to cease alcohol consumption since she uses Ambien.  She told me that alcohol consumption is 1 glass of wine per day.      Recurrent herpes simplex labialis (cold sores) takes Valtrex 500 mg tablet intermittently.       She recall satisfactory screening mammogram done either March or April 2022 near [facility]    Colon polyp, 5 mm in the transverse seen on colonoscopy November 23, 2020.  Recheck in 5 years would be entirely optional.  She told me that the prep was really unpleasant, to the point that it made her vomit.  I told her that I think she could probably stop doing screening colonoscopy, especially since she also had a negative Cologuard on November 6, 2020     Immunizations up-to-date     COVID-19 Vaccine Bivalent Booster 18+ (Moderna) 10/29/2022      Pneumo Conj 13-V (2010&after) 11/01/2016   Pneumococcal 23 valent 08/28/2020      Influenza (High Dose) 3 valent vaccine 09/28/2020, 10/26/2022      Zoster vaccine recombinant adjuvanted (SHINGRIX) 08/21/2018, 05/13/2019

## 2023-10-13 NOTE — PROGRESS NOTES
Pt presents for a blood pressure check. Pt was seen in clinic today.     Alma Michele is a 78 year old year old patient who comes in today for a Blood Pressure check because of ongoing blood pressure monitoring.  Vital Signs as repeated by FARIDEH WILLIAMSON RN  Patient is not on blood pressure medications, recently stopped amlodipine.     Patients home monitor:152/89 HR 68    Manual BP: 132/82 manual HR for full minute: 68    Patient is monitoring Blood Pressure at home.  Average readings if yes are 53/? pt checks her BP every morning. Pt states that when she had the low blood pressure she had her head between her legs and was checking her blood pressure in this fashion.     Writer educated that if she feels like she is going to pass out, ok to put her head between her legs, but then to wait to be in a sitting position before she takes her blood pressure.       Current complaints: light-headedness  Disposition:  pt ok to go home.        Writer gave patient:     For informational purposes only. Not to replace the advice of your health care provider. Photo: ID [id number]   [employer]. Text copyright   2023 [facility]. All rights reserved. Clinically reviewed by [facility]. [employer] [id number] - Rev 06/23.  Checking Your Blood Pressure at Home  During and after pregnancy  How do I measure my blood pressure?  It's important to take the readings at the same time each day, such as morning and evening. Take your blood pressure before taking any morning medications.  How to get the most accurate reading  30 minutes before checking your blood pressure, avoid the following:  Drinking caffeine  Drinking alcohol  Eating  Smoking  Exercising  5 minutes before checking your blood pressure:  Use the bathroom and urinate so that you have an empty bladder.  Sit still in a chair for around 5 minutes. Stay calm and relaxed and do not talk if possible.     To check your blood  pressure:  Sit up straight in a chair.  Place your feet on the floor. Don't cross your ankles or legs.  Rest your arm at the level of your heart on a table or desk or on the arm of a chair. Use the same arm every day.  Pull up your shirt sleeve. Don't take the measurement over clothes.  Wrap the blood pressure cuff around the upper part of your left arm, 1 inch (2.5 cm) above your elbow.  Fit the cuff snugly around your arm. You should be able to place only one finger between the cuff and your arm.  Position the cord so that it rests in the bend of your elbow.  Press the power button.  Sit quietly while the cuff inflates and deflates.  Read the digital reading on the monitor screen and write the numbers down (record them) in a notebook.  Wait 2-3 minutes, then repeat the steps, starting at step 1.  Which features do you need?  Arm cuff monitors give the most exact readings.  Wrist and finger blood pressure monitors are often less exact.  Pick a blood pressure monitor that has passed tests to show they measure exactly. Blood pressure cuffs for sale in the [place] that have passed tests are listed on the website www.validatebp.org.  Some monitors that have passed tests are:  Omron 3 Series Upper Arm Blood Pressure Monitor (Model EH2484)  Omron 5 Series Upper Arm Blood Pressure Monitor (Model [id number])  Omron 7 Series Upper Arm Blood Pressure Monitor (Model [id number]-7320)  [employer] Upper Arm Blood Pressure Monitor with Talking Function (UA 1030T)  Don't use smartphone apps. There are many smartphone apps that claim to check your blood pressure using the pulse in your wrist or finger. These don't work. They haven't passed any tests. Don't give your clinic a blood pressure reading from a smartphone alfredo.  If you have a flexible spending account (FSA) or health savings account (HSA), you may wish to pay yourself back (reimburse) for the machine and cuff. A blood pressure monitor is an allowed over-the- counter (OTC) item to pay  yourself back from these accounts.  Cuff size  The size of the arm cuff is a key feature. Make sure the cuff is the right size for your arm. If the cuff isn't the right size, readings will either be too high or low.  To know what size cuff to buy, measure the distance around your bicep (upper arm).  Use a flexible measuring tape or . Place the measuring tape [facility] between your armpit and elbow. Measure the distance around your arm in inches.  You may need to buy a cuff apart from the machine to get the right size.    Cuff sizes and arm measurements  Small adult: 22 to 26 cm (8.7 to 10.2 inches)  Adult: 27 to 34 cm (10.6 to 13.4 inches)  Large adult: 35 to 44 cm (13.8 to 17.3 inches)  Adult thigh: 45 to 52 cm (17.7 to 20.5 inches)

## 2023-10-13 NOTE — PROGRESS NOTES
Office Visit - Follow Up   Alma Michele   78 year old female    Date of Visit: 10/13/2023    Chief Complaint   Patient presents with    Hypotension        -------------------------------------------------------------------------------------------------------------------------  Assessment and Plan    Follow-up after visit to the [facility] October 11, 2023 because of    Hypotension, for which I agree with completely stopping the small dose of amlodipine 2.5 mg a day, that she had been taking (I had reduced her dose earlier in 2023), and I think other contributors to low blood pressure are inadequate hydration, may be some aspect of muscular deconditioning, but I do not think there is any cardiac problem here, nor do I think she has adrenal insufficiency.    I reviewed the laboratory work done at the [facility]rgency [facility], which showed a mildly reduced sodium concentration 134, and a slightly elevated BUN to creatinine ratio, and her urine was also concentrated with a specific gravity of 0.025.  Together these suggest dehydration.    Although Alma says that she stays hydrated, her  actually chimes in here this morning and says that she does not drink enough liquid.    I told Pavan that I want her to drink at least 3 L of liquid in a day, and allow her to self additional salt content in her diet.  She told me she ate Posten meatballs last night, and that sounds good.  I would encourage frequent meals.    At the moment I am not pursuing any gastroenterology work-up, until unless I am convinced that we are facing a problem with early satiety, or swallowing or malabsorption problems.  She did have a tissue transglutaminase antibody (test for celiac disease) that was - August 3, 2023.    She did have an electrocardiogram done in September 2023 that showed low voltages, sinus bradycardia, but otherwise normal.    She had laboratory work done in August 2023 showing normal thyroid function, pretty much normal  comprehensive metabolic panel, with normal bicarbonate and potassium levels (therefore not suspecting adrenal insufficiency).    If despite the hydration and nutrition strategy Alma is still experiencing low blood pressures, then my next step of investigation would be to check her adrenal function with a morning cortisol and ACTH level, possibly a Cortrosyn stimulation test.  I am not seeing any signs of any neurological conditions such as Parkinson's disease which can have dysautonomia as a manifestation.    She has a home blood pressure machine, and I asked her to bring it to a clinic visit, either here or could even be one of her especially consultation visits, to make sure that machine is accurate by doing a [id number] comparison against a manual reading    We will also get 30-day cardiac event recorder, to look for any signs of rhythm disturbance.  9-  Sinus rhythm   Low voltage QRS   Borderline ECG   No previous ECGs available   Confirmed by JEANNA FROST MD LOC:[facility] ([id number]) on 9/26/2023 4:09:59 PM     Underwent injection of platelet rich plasma into the right gluteal tendon, for diagnosis of gluteal tendinopathy  Done at [place] [facility] on October 10, 2023  She is getting around with crutches, and was told that it may take several weeks to notice symptomatic benefit.    Essential hypertension--but the problem as of October 2023 has been low blood pressure, amlodipine stopped    Underweight, body mass index around 18    Wt Readings from Last 5 Encounters:   10/13/23 45.8 kg (101 lb)   09/26/23 45.8 kg (101 lb)   08/31/23 46.7 kg (103 lb)   08/03/23 47.2 kg (104 lb)   05/02/23 45.4 kg (100 lb)     Bilateral earwax, despite her using over-the-counter peroxide eardrops, will send her to ENT for manual clear out, and once that is done, I want her to use wax dissolving eardrops every week, maybe even twice a week to keep the ears clear.     Successful retina surgery December 6, 2022 for macular pucker  She  will be departing for the [facility] after New Year's Day    Spinal canal stenosis  Right L5 transforaminal epidural steroid injection July 14, 2022  Lumbar spine MRI June 16, 2022  Moderate lumbar spondylosis with broad-based disc herniations evident throughout the lumbar spine.  Disc bulging and facet degenerative changes resulting in severe spinal canal stenosis and lateral recess narrowing at L4-5 and moderate to severe spinal canal stenosis at L3-4.     Takes gabapentin 600 mg at bedtime (2 capsules of 300 mg each)     Right side piriformis syndrome.  After she had consultation with Dr. Sage Pickett at the , she was given a diagnosis of piriformis syndrome, and got an injection done at the  in January 2023 which was helpful  And since then has been doing physical therapy twice a week     But still bothered by pain with ambulation, so she is going to [facility] with [facility] to consider Botox injection to the chronically contracted muscles     Underwent sacroiliac joint injection on the right side October 14, 2022    Osteoporosis for which she has been on alendronate for approximately 4 years, stopping in 2019, restarted June 2021  Switch to Reclast infusion (zoledronic acid) administered August 31, 2023, which plan to give that for 2 to 3 years, then drug holiday.  Under supervision of Dr. Gallardo (endocrinology)    [facility] endocr[facility]ology had recommended her to continue alendronate through 2020 to complete 5 years.  However she decided to stop it in 2019.  DEXA scan was May 2019 done at [facility] with a femoral neck T score of -2.5, lumbar spine T score -2.2.     Fariba 3 2021  1. The spine bone density L1-L2 with T-score -2.4.  2. Femoral bone densities show left total hip T- score -2.9 and right total hip T-score -2.7.  3. Trabecular bone score indicates poor trabecular bone architecture.     I reminded her about the importance of staying on her calcium and vitamin D  supplements. She is no history of fractures.  She stays physically active.  Her gait is stable.    Gravelly voice  She has no pain in her throat or neck, and I do not palpate any abnormalities.  I told her 1 possibilities of vocal cord polyps, although she does not strain her vocal cords.  She has never had a smoking habit.  I told her that if she wants to pursue evaluation, that would be an otolaryngology consultation, which would include fiberoptic laryngoscopy.  She will let me know if she wants to proceed with that.     Bilateral epiphora, takes doxycycline low-dose 50 mg once a day, monitored at [facility], Status post bilateral intraocular implants.       Earwax, I suggested to use an over-the-counter wax dissolving eardrop kit, example brand Debrox or Murine    Mild alopecia and spider veins      Insomnia for which he uses every day Ambien, but I really want her to cease alcohol consumption since she uses Ambien.  She told me that alcohol consumption is 1 glass of wine per day.      Recurrent herpes simplex labialis (cold sores) takes Valtrex 500 mg tablet intermittently.       She recall satisfactory screening mammogram done either March or April 2022 near [facility]    Colon polyp, 5 mm in the transverse seen on colonoscopy November 23, 2020.  Recheck in 5 years would be entirely optional.  She told me that the prep was really unpleasant, to the point that it made her vomit.  I told her that I think she could probably stop doing screening colonoscopy, especially since she also had a negative Cologuard on November 6, 2020     Immunizations up-to-date     COVID-19 Vaccine Bivalent Booster 18+ (Moderna) 10/29/2022      Pneumo Conj 13-V (2010&after) 11/01/2016   Pneumococcal 23 valent 08/28/2020      Influenza (High Dose) 3 valent vaccine 09/28/2020, 10/26/2022      Zoster vaccine recombinant adjuvanted (SHINGRIX) 08/21/2018, 05/13/2019          --------------------------------------------------------------------------------------------------------------------------  History of Present Illness  This 78 year old old     Symptom onset:  3-4 weeks ago     She eats 4 or more servings of fruits and vegetables daily.She consumes 0 sweetened beverage(s) daily.She exercises with enough effort to increase her heart rate 9 or less minutes per day.  She exercises with enough effort to increase her heart rate 3 or less days per week.   She is taking medications regularly.    Wt Readings from Last 3 Encounters:   10/13/23 45.8 kg (101 lb)   09/26/23 45.8 kg (101 lb)   08/31/23 46.7 kg (103 lb)     BP Readings from Last 3 Encounters:   10/13/23 108/72   09/26/23 116/75   08/31/23 (!) 143/82       Lab Results   Component Value Date    WBC 6.3 12/02/2022    HGB 12.6 12/02/2022    HCT 38.7 12/02/2022     12/02/2022    CHOL 197 09/21/2021    TRIG 54 09/21/2021    HDL 82 09/21/2021    ALT 15 08/03/2023    AST 24 08/03/2023     (L) 09/26/2023    BUN 13.0 09/26/2023    CO2 24 09/26/2023    TSH 2.24 08/03/2023        Review of Systems: A comprehensive review of systems was negative except as noted.  ---------------------------------------------------------------------------------------------------------------------------    Medications, Allergies, Social, and Problem List   Post Medication Reconciliation Status: discharge medications reconciled, continue medications without change      Current Outpatient Medications   Medication Sig Dispense Refill    cholecalciferol, vitamin D3, (VITAMIN D3) 2,000 unit cap [CHOLECALCIFEROL, VITAMIN D3, (VITAMIN D3) 2,000 UNIT CAP] Take 1 capsule by mouth daily.      gabapentin (NEURONTIN) 300 MG capsule TAKE 2 CAPSULES BY MOUTH AT BEDTIME. 180 capsule 1    ibuprofen (ADVIL,MOTRIN) 200 MG tablet [IBUPROFEN (ADVIL,MOTRIN) 200 MG TABLET] Take 400 mg by mouth every 6 (six) hours as needed for pain.      ELIZABETH 128 5 % ophthalmic  "ointment [ELIZABETH 128 5 % OPHTHALMIC OINTMENT] CORRIE 0.25 INCH TO BOTH EYES QPM      valACYclovir (VALTREX) 500 MG tablet Take 1 tablet (500 mg) by mouth 2 times daily as needed (In case of cold sore recurrence, take for 3 days, start as soon as symptoms appear) 20 tablet 1    vitamin B-Complex Take 1 tablet by mouth daily      zolpidem (AMBIEN) 10 MG tablet Take 0.5 tablets (5 mg) by mouth nightly as needed 45 tablet 0     No Known Allergies  Social History     Tobacco Use    Smoking status: Never    Smokeless tobacco: Never   Vaping Use    Vaping Use: Never used   Substance Use Topics    Alcohol use: Yes     Alcohol/week: 14.0 standard drinks of alcohol     Types: 14 Standard drinks or equivalent per week     Comment: 14 drinks/week    Drug use: No     Patient Active Problem List   Diagnosis    Recurrent cold sores    Osteoporosis    Actinic keratosis    H/O bilateral breast implants    Bilateral epiphora    Insomnia    Underweight    Hyponatremia    Personal history of colonic polyps    Piriformis muscle pain    Muscle spasm        Reviewed, reconciled and updated       Physical Exam   General Appearance:       /72 (BP Location: Right arm, Patient Position: Sitting, Cuff Size: Adult Small)   Pulse 75   Temp 98.6  F (37  C)   Resp 16   Ht 1.626 m (5' 4\")   Wt 45.8 kg (101 lb)   SpO2 97%   BMI 17.34 kg/m      She looks tired, continues underweight with body mass index of 17  Lungs clear  Heart rhythm regular  Abdomen nontender  No edema  Diffuse sarcopenia     Additional Information   I spent 30 minutes on this encounter, including reviewing interval history since last visit, examining the patient, explaining and counseling the issues enumerated in the Assessment and Plan (patient given a copy), ordering indicated tests     RAJ JUNG MD, MD        Answers submitted by the patient for this visit:  General Questionnaire (Submitted on 10/13/2023)  Chief Complaint: Chronic problems general questions HPI " Form  How many servings of fruits and vegetables do you eat daily?: 4 or more  On average, how many sweetened beverages do you drink each day (Examples: soda, juice, sweet tea, etc.  Do NOT count diet or artificially sweetened beverages)?: 0  How many minutes a day do you exercise enough to make your heart beat faster?: 9 or less  How many days a week do you exercise enough to make your heart beat faster?: 3 or less  How many days per week do you miss taking your medication?: 0  General Concern (Submitted on 10/13/2023)  Chief Complaint: Chronic problems general questions HPI Form    Conflicting answers have been found for some questions. Please document the patient's answers manually.

## 2023-10-19 ENCOUNTER — HOSPITAL ENCOUNTER (OUTPATIENT)
Dept: CARDIOLOGY | Facility: CLINIC | Age: 78
Discharge: HOME | End: 2023-10-19
Attending: INTERNAL MEDICINE | Admitting: INTERNAL MEDICINE
Payer: MEDICARE

## 2023-10-19 DIAGNOSIS — R55 POSTURAL DIZZINESS WITH PRESYNCOPE: ICD-10-CM

## 2023-10-19 DIAGNOSIS — R42 POSTURAL DIZZINESS WITH PRESYNCOPE: ICD-10-CM

## 2023-10-19 PROCEDURE — 93270 REMOTE 30 DAY ECG REV/REPORT: CPT

## 2023-10-24 ENCOUNTER — TRANSFERRED RECORDS (OUTPATIENT)
Dept: HEALTH INFORMATION MANAGEMENT | Facility: CLINIC | Age: 78
End: 2023-10-24
Payer: COMMERCIAL

## 2023-10-28 ENCOUNTER — NURSE TRIAGE (OUTPATIENT)
Dept: NURSING | Facility: CLINIC | Age: 78
End: 2023-10-28
Payer: COMMERCIAL

## 2023-10-28 NOTE — TELEPHONE ENCOUNTER
"Pt reports elevated BP -> new today.  \"Typically runs 150s/80s to 160s/80s over the past five days.\"    BP at first check today -> 197/100.  Currently 182/96.  Asymptomatic.    Pt was recently taken off amlodipine 2.5 mg after having postural hypotension episodes.  Was also advised to drink generous water.  Currently wearing Holter Monitor.    Had salted french fries last evening, diet Coke.  No other known sodium loads.  No swelling in extremities.    Pt still has amlodipine available at home.  Pt asks whether she should resume amlodipine for currently elevated BP?  Question appears appropriate for paging on-call provider.  No provider indicated in [facility] for [facility], therefore contacting page  for assistance ([phone]).  Reached  ([place]) who concurs that no provider is indicated in [facility]; reports Dr Colton Dougherty appears to be on call for [facility]; now confirming ....   \"[place]\" reports Dr Rene Vo is on call.  Now being paged @ 12:28 pm ...    Accepting another interim triage call while awaiting on-call provider's callback ....  Received callback from Dr Vo @ 12:38 pm and also accesses pt's chart.  Advised:  \"Hold amlodipine for the weekend due to recent postural hypotension episodes;  \"Speak w/PCP or colleague Monday;  \"If systolic generally below 200, no action this weekend.\"    Called back to pt 12:48 pm and conveyed Dr's advice.  Pt verbalizes clear understanding after some discussion.  Agrees to plan.    Discussed add'l self-care measures:  - avoid sodium loads  - examples -> avoid processed meats, diet sodas w/sodium sometimes added    Pt verbalizes understanding.  Agrees to plan.    [facility] Nurse Advisor     Reason for Disposition   Systolic BP  >= 180 OR Diastolic >= 110     Systolic > 180    Additional Information   Negative: Difficult to awaken or acting confused (e.g., disoriented, slurred speech)   Negative: Severe difficulty breathing " (e.g., struggling for each breath, speaks in single words)   Negative: [1] Weakness of the face, arm or leg on one side of the body AND [2] new onset   Negative: [1] Numbness (i.e., loss of sensation) of the face, arm or leg on one side of the body AND [2] new onset   Negative: [1] Chest pain lasts > 5 minutes AND [2] history of heart disease  (i.e., heart attack, bypass surgery, angina, angioplasty, CHF)   Negative: [1] Chest pain AND [2] took nitrogylcerin AND [3] pain was not relieved   Negative: Sounds like a life-threatening emergency to the triager   Negative: Symptom is main concern  (e.g., headache, chest pain)   Negative: Low blood pressure is main concern   Negative: [1] Systolic BP  >= 160 OR Diastolic >= 100 AND [2] cardiac or neurologic symptoms (e.g., chest pain, difficulty breathing, unsteady gait, blurred vision)   Negative: [1] Pregnant > 20 weeks (or postpartum < 6 weeks) AND [2] new hand or face swelling   Negative: [1] Pregnant > 20 weeks AND [2] BP Systolic BP  >= 140 OR Diastolic >= 90   Negative: [1] Systolic BP  >= 200 OR Diastolic >= 120  AND [2] having NO cardiac or neurologic symptoms   Negative: [1] Postpartum < 6 weeks AND [2] BP Systolic BP  >= 140 OR Diastolic >= 90   Negative: [1] Systolic BP  >= 180 OR Diastolic >= 110 AND [2] missed most recent dose of blood pressure medication    Protocols used: High Blood Pressure-A-

## 2023-10-30 ENCOUNTER — NURSE TRIAGE (OUTPATIENT)
Dept: INTERNAL MEDICINE | Facility: CLINIC | Age: 78
End: 2023-10-30

## 2023-10-30 NOTE — TELEPHONE ENCOUNTER
GRETEL - Status Update    Who is Calling: patient    Update: Patient wanted to let Dr. Cruz know that her BP's over the last couple of days have been running high. Would he recommend that she come in to be seen.      Sat- 10/28- 197/100 AM   182/96 Karine  Sun- 10/29  -147/89  Mon- 10/30- 172/101      Does caller want a call/response back: Yes     Could we send this information to you in [employer] or would you prefer to receive a phone call?:   Patient would prefer a phone call   Okay to leave a detailed message?: Yes at Cell number on file:    Telephone Information:   Mobile [phone]

## 2023-10-31 NOTE — TELEPHONE ENCOUNTER
I contacted patient. She is concerns with recent BP elevation for last 4 days. Reading below. She denied chest pain, palpitations, SOB, headache, vision changes.     10/13 OV  Hypotension, for which I agree with completely stopping the small dose of amlodipine 2.5 mg a day, that she had been taking (I had reduced her dose earlier in 2023), and I think other contributors to low blood pressure are inadequate hydration, may be some aspect of muscular deconditioning, but I do not think there is any cardiac problem here, nor do I think she has adrenal insufficiency.     At home readings   10/28 AM   197/100     10/28 PM   182/96  10/29          147/89  10/30          172/101  10/31          164/90      In clinic readings  BP Readings from Last 3 Encounters:   10/13/23 132/82   10/13/23 108/72   09/26/23 116/75       Protocol disposition for patient to be seen within 3 days. Patient had recent OV with pcp with medication change and would direction by pcp if OV is needed.     Reason for Disposition   Systolic BP >= 160 OR Diastolic >= 100    Additional Information   Negative: Sounds like a life-threatening emergency to the triager   Negative: Symptom is main concern (e.g., headache, chest pain)   Negative: Low blood pressure is main concern    Protocols used: Blood Pressure - High-A-OH

## 2023-10-31 NOTE — TELEPHONE ENCOUNTER
Spoke at length with patient about home BP machine and accuracy. She had recent BP check and manual and home machine had 20 point difference- she has not purchased a new one since this. She is unable to come today for BP check but is tomorrow. She verbalized understanding of pcp recommendations and feels comfortable waiting until tomorrow to address. She is ok with purchasing new home machine if needed.     Review red flag symptoms and when to be seen in ED or call clinic back.

## 2023-10-31 NOTE — TELEPHONE ENCOUNTER
Is her home blood pressure machine accurate???    Please schedule her for a nurse visit, bring her home machine in so we can do a [id number] comparison against a manual reading, then we could use the data to make adjustments with blood pressure medication.

## 2023-11-01 ENCOUNTER — ALLIED HEALTH/NURSE VISIT (OUTPATIENT)
Dept: FAMILY MEDICINE | Facility: CLINIC | Age: 78
End: 2023-11-01
Payer: MEDICARE

## 2023-11-01 VITALS — HEART RATE: 60 BPM | DIASTOLIC BLOOD PRESSURE: 84 MMHG | OXYGEN SATURATION: 97 % | SYSTOLIC BLOOD PRESSURE: 158 MMHG

## 2023-11-01 DIAGNOSIS — I10 ESSENTIAL HYPERTENSION: Primary | ICD-10-CM

## 2023-11-01 PROCEDURE — 99207 PR NO CHARGE NURSE ONLY: CPT

## 2023-11-01 RX ORDER — AMLODIPINE BESYLATE 2.5 MG/1
2.5 TABLET ORAL DAILY
Qty: 30 TABLET | Refills: 11 | Status: SHIPPED | OUTPATIENT
Start: 2023-11-01 | End: 2024-02-01

## 2023-11-01 NOTE — PROGRESS NOTES
Alma Michele is a 78 year old year old patient who comes in today for a Blood Pressure check because of ongoing blood pressure monitoring.  Vital Signs as repeated by RN Do Burden RN  Patient is taking medication as prescribed - (not taking BP med)  Patient is monitoring Blood Pressure at home.  Average readings if yes are 150/80 to 160/80  Current complaints: none  Disposition:  patient reminded to call as needed and I forwarded this to Dr. Cruz for review.     Pt brought in BP cuff.    Home cuff readings:    New machine  Adult regular cuff size  Right arm  Sitting  156/101  HR: 66    Old machine  180/93  HR 66    Old machine appears to be running slightly higher, will discontinue use at home at this time.    Pt denied HTN sx and stated you can call her or send my chart message.    FARIDEH Johnson

## 2023-11-20 PROCEDURE — 93228 REMOTE 30 DAY ECG REV/REPORT: CPT | Performed by: INTERNAL MEDICINE

## 2023-11-29 ENCOUNTER — TRANSFERRED RECORDS (OUTPATIENT)
Dept: HEALTH INFORMATION MANAGEMENT | Facility: CLINIC | Age: 78
End: 2023-11-29

## 2023-12-01 ENCOUNTER — OFFICE VISIT (OUTPATIENT)
Dept: INTERNAL MEDICINE | Facility: CLINIC | Age: 78
End: 2023-12-01
Payer: MEDICARE

## 2023-12-01 VITALS
OXYGEN SATURATION: 99 % | WEIGHT: 102 LBS | TEMPERATURE: 98.6 F | HEART RATE: 79 BPM | HEIGHT: 64 IN | BODY MASS INDEX: 17.42 KG/M2 | RESPIRATION RATE: 16 BRPM | SYSTOLIC BLOOD PRESSURE: 116 MMHG | DIASTOLIC BLOOD PRESSURE: 62 MMHG

## 2023-12-01 DIAGNOSIS — I10 ESSENTIAL HYPERTENSION, BENIGN: Primary | ICD-10-CM

## 2023-12-01 PROCEDURE — 99213 OFFICE O/P EST LOW 20 MIN: CPT | Performed by: INTERNAL MEDICINE

## 2023-12-01 NOTE — PROGRESS NOTES
Office Visit - Follow Up   Alma Michele   78 year old female    Date of Visit: 12/1/2023    Chief Complaint   Patient presents with    Hypertension        -------------------------------------------------------------------------------------------------------------------------  Assessment and Plan    Follow-up multiple issues, including management of blood pressure    Essential hypertension, for which Alma resumed the small dose of amlodipine 2.5 mg a day approximately November 2.    I had asked Alma to stop the amlodipine after our previous meeting of October 13, because she had experienced a syncopal spell that led her to the [facility] October 11, and was hypotensive.    But when she tried stopping the amlodipine, her blood pressures went up to 150 to even 170 systolic according to her home machine.    She did bring her home blood pressure machine to clinic today December 1, 2023 and it compares fairly close to a manual reading, thus we will believe the numbers.    She said that she feels well taking the amlodipine 2.5 mg a day.  We recorded blood pressure here in clinic of 116/62 which is okay, although may be slightly on the low side.  I reminded Alma about staying well-hydrated.  She can even allow herself a little bit more salt, maybe even a little bit more overall calories including especially protein, so she can build up some more lean muscle mass and may be even gained a few pounds.     tissue transglutaminase antibody (test for celiac disease) that was negative August 3, 2023.  She had laboratory work done in August 2023 showing normal thyroid function, pretty much normal comprehensive metabolic panel, with normal bicarbonate and potassium levels (therefore not suspecting adrenal insufficiency).    25-day cardiac event monitor showing no signs of rhythm disturbance  Cardiac event monitoring from 10/19/2023 to 11/17/2023 (monitored duration 25d 20h).     9-  Sinus rhythm   Low voltage QRS    Borderline ECG   No previous ECGs available   Confirmed by JEANNA FROST MD LOC:JN ([id number]) on 9/26/2023 4:09:59 PM      Underwent injection of platelet rich plasma into the right gluteal tendon, for diagnosis of gluteal tendinopathy  Done at [facility] on October 10, 2023  She is getting around with crutches, and was told that it may take several weeks to notice symptomatic benefit.    Underweight, body mass index around 18     Wt Readings from Last 5 Encounters:   12/01/23 46.3 kg (102 lb)   10/13/23 45.8 kg (101 lb)   09/26/23 45.8 kg (101 lb)   08/31/23 46.7 kg (103 lb)   08/03/23 47.2 kg (104 lb)     Bilateral earwax, despite her using over-the-counter peroxide eardrops  ENT for manual clear out, and once that is done, I want her to use wax dissolving eardrops every week, maybe even twice a week to keep the ears clear.    Successful retina surgery December 6, 2022 for macular pucker  She will be departing for the [facility] after New Year's Day     Spinal canal stenosis  Right L5 transforaminal epidural steroid injection July 14, 2022  Lumbar spine MRI June 16, 2022  Moderate lumbar spondylosis with broad-based disc herniations evident throughout the lumbar spine.  Disc bulging and facet degenerative changes resulting in severe spinal canal stenosis and lateral recess narrowing at L4-5 and moderate to severe spinal canal stenosis at L3-4.     Takes gabapentin 600 mg at bedtime (2 capsules of 300 mg each)     Right side piriformis syndrome.  After she had consultation with Dr. Sage Pickett at the , she was given a diagnosis of piriformis syndrome, and got an injection done at the  in January 2023 which was helpful  And since then has been doing physical therapy twice a week     But still bothered by pain with ambulation, so she is going to meet with [facility] to consider Botox injection to the chronically contracted muscles     Underwent sacroiliac joint injection on the  right side October 14, 2022     Osteoporosis for which she has been on alendronate for approximately 4 years, stopping in 2019, restarted June 2021  Switch to Reclast infusion (zoledronic acid) administered August 31, 2023, which plan to give that for 2 to 3 years, then drug holiday.  Under supervision of Dr. Gallardo (endocrinology)     [facility] endocrinology had recommended her to continue alendronate through 2020 to complete 5 years.  However she decided to stop it in 2019.  DEXA scan was May 2019 done at [facility] with a femoral neck T score of -2.5, lumbar spine T score -2.2.     Fariba 3 2021  1. The spine bone density L1-L2 with T-score -2.4.  2. Femoral bone densities show left total hip T- score -2.9 and right total hip T-score -2.7.  3. Trabecular bone score indicates poor trabecular bone architecture.     I reminded her about the importance of staying on her calcium and vitamin D supplements. She is no history of fractures.  She stays physically active.  Her gait is stable.     Gravelly voice  She has no pain in her throat or neck, and I do not palpate any abnormalities.  I told her 1 possibilities of vocal cord polyps, although she does not strain her vocal cords.  She has never had a smoking habit.  I told her that if she wants to pursue evaluation, that would be an otolaryngology consultation, which would include fiberoptic laryngoscopy.  She will let me know if she wants to proceed with that.     Bilateral epiphora, takes doxycycline low-dose 50 mg once a day, monitored at [place] [facility], Status post bilateral intraocular implants.       Earwax, I suggested to use an over-the-counter wax dissolving eardrop kit, example brand Debrox or Murine     Mild alopecia and spider veins      Insomnia for which he uses every day Ambien, but I really want her to cease alcohol consumption since she uses Ambien.  She told me that alcohol consumption is 1 glass of wine per day.      Recurrent herpes simplex  eduardo (cold sores) takes Valtrex 500 mg tablet intermittently.       She recall satisfactory screening mammogram done either March or April 2022 near [facility]     Colon polyp, 5 mm in the transverse seen on colonoscopy November 23, 2020.  Recheck in 5 years would be entirely optional.  She told me that the prep was really unpleasant, to the point that it made her vomit.  I told her that I think she could probably stop doing screening colonoscopy, especially since she also had a negative Cologuard on November 6, 2020     Immunizations up-to-date     COVID-19 Vaccine Bivalent Booster 18+ (Moderna) 10/29/2022      Pneumo Conj 13-V (2010&after) 11/01/2016   Pneumococcal 23 valent 08/28/2020      Influenza (High Dose) 3 valent vaccine 09/28/2020, 10/26/2022      Zoster vaccine recombinant adjuvanted (SHINGRIX)       --------------------------------------------------------------------------------------------------------------------------  History of Present Illness  This 78 year old old         Reason for visit:  HTN     She eats 2-3 servings of fruits and vegetables daily.She consumes 0 sweetened beverage(s) daily.She exercises with enough effort to increase her heart rate 9 or less minutes per day.  She exercises with enough effort to increase her heart rate 3 or less days per week.     Wt Readings from Last 3 Encounters:   12/01/23 46.3 kg (102 lb)   10/13/23 45.8 kg (101 lb)   09/26/23 45.8 kg (101 lb)     BP Readings from Last 3 Encounters:   12/01/23 116/62   11/01/23 (!) 158/84   10/13/23 132/82       Lab Results   Component Value Date    WBC 6.3 12/02/2022    HGB 12.6 12/02/2022    HCT 38.7 12/02/2022     12/02/2022    CHOL 197 09/21/2021    TRIG 54 09/21/2021    HDL 82 09/21/2021    ALT 15 08/03/2023    AST 24 08/03/2023     (L) 09/26/2023    BUN 13.0 09/26/2023    CO2 24 09/26/2023    TSH 2.24 08/03/2023        Review of Systems: A comprehensive review of systems was negative  except as noted.  ---------------------------------------------------------------------------------------------------------------------------    Medications, Allergies, Social, and Problem List     Current Outpatient Medications   Medication Sig Dispense Refill    amLODIPine (NORVASC) 2.5 MG tablet Take 1 tablet (2.5 mg) by mouth daily 30 tablet 11    cholecalciferol, vitamin D3, (VITAMIN D3) 2,000 unit cap [CHOLECALCIFEROL, VITAMIN D3, (VITAMIN D3) 2,000 UNIT CAP] Take 1 capsule by mouth daily.      gabapentin (NEURONTIN) 300 MG capsule TAKE 2 CAPSULES BY MOUTH AT BEDTIME. 180 capsule 1    ibuprofen (ADVIL,MOTRIN) 200 MG tablet [IBUPROFEN (ADVIL,MOTRIN) 200 MG TABLET] Take 400 mg by mouth every 6 (six) hours as needed for pain.      ELIZABETH 128 5 % ophthalmic ointment [ELIZABETH 128 5 % OPHTHALMIC OINTMENT] CORRIE 0.25 INCH TO BOTH EYES QPM      valACYclovir (VALTREX) 500 MG tablet Take 1 tablet (500 mg) by mouth 2 times daily as needed (In case of cold sore recurrence, take for 3 days, start as soon as symptoms appear) 20 tablet 1    vitamin B-Complex Take 1 tablet by mouth daily      zolpidem (AMBIEN) 10 MG tablet Take 0.5 tablets (5 mg) by mouth nightly as needed 45 tablet 0     No Known Allergies  Social History     Tobacco Use    Smoking status: Never     Passive exposure: Never    Smokeless tobacco: Never   Vaping Use    Vaping Use: Never used   Substance Use Topics    Alcohol use: Yes     Alcohol/week: 14.0 standard drinks of alcohol     Types: 14 Standard drinks or equivalent per week     Comment: 14 drinks/week    Drug use: No     Patient Active Problem List   Diagnosis    Recurrent cold sores    Osteoporosis    Actinic keratosis    H/O bilateral breast implants    Bilateral epiphora    Insomnia    Underweight    Hyponatremia    Personal history of colonic polyps    Piriformis muscle pain    Muscle spasm        Reviewed, reconciled and updated       Physical Exam   General Appearance:       /62 (BP Location:  "Right arm, Patient Position: Sitting, Cuff Size: Adult Regular)   Pulse 79   Temp 98.6  F (37  C)   Resp 16   Ht 1.626 m (5' 4\")   Wt 46.3 kg (102 lb)   LMP  (LMP Unknown)   SpO2 99%   BMI 17.51 kg/m      Appears well, blood pressure looks good.     Additional Information   I spent 20 minutes on this encounter, including reviewing interval history since last visit, examining the patient, explaining and counseling the issues enumerated in the Assessment and Plan (patient given a copy)       RAJ JUNG MD, MD          "

## 2023-12-01 NOTE — PATIENT INSTRUCTIONS
Follow-up multiple issues, including management of blood pressure    Essential hypertension, for which Alma resumed the small dose of amlodipine 2.5 mg a day approximately November 2.    I had asked Alma to stop the amlodipine after our previous meeting of October 13, because she had experienced a syncopal spell that led her to the [facility] October 11, and was hypotensive.    But when she tried stopping the amlodipine, her blood pressures went up to 150 to even 170 systolic according to her home machine.    She did bring her home blood pressure machine to clinic today December 1, 2023 and it compares fairly close to a manual reading, thus we will believe the numbers.    She said that she feels well taking the amlodipine 2.5 mg a day.  We recorded blood pressure here in clinic of 116/62 which is okay, although may be slightly on the low side.  I reminded Alma about staying well-hydrated.  She can even allow herself a little bit more salt, maybe even a little bit more overall calories including especially protein, so she can build up some more lean muscle mass and may be even gained a few pounds.     tissue transglutaminase antibody (test for celiac disease) that was negative August 3, 2023.  She had laboratory work done in August 2023 showing normal thyroid function, pretty much normal comprehensive metabolic panel, with normal bicarbonate and potassium levels (therefore not suspecting adrenal insufficiency).    25-day cardiac event monitor showing no signs of rhythm disturbance  Cardiac event monitoring from 10/19/2023 to 11/17/2023 (monitored duration 25d 20h).     9-  Sinus rhythm   Low voltage QRS   Borderline ECG   No previous ECGs available   Confirmed by JEANNA FROST MD LOC:[facility] ([id number]) on 9/26/2023 4:09:59 PM      Underwent injection of platelet rich plasma into the right gluteal tendon, for diagnosis of gluteal tendinopathy  Done at [facility] on October 10, 2023  She is getting around  with crutches, and was told that it may take several weeks to notice symptomatic benefit.    Underweight, body mass index around 18     Wt Readings from Last 5 Encounters:   12/01/23 46.3 kg (102 lb)   10/13/23 45.8 kg (101 lb)   09/26/23 45.8 kg (101 lb)   08/31/23 46.7 kg (103 lb)   08/03/23 47.2 kg (104 lb)     Bilateral earwax, despite her using over-the-counter peroxide eardrops  ENT for manual clear out, and once that is done, I want her to use wax dissolving eardrops every week, maybe even twice a week to keep the ears clear.    Successful retina surgery December 6, 2022 for macular pucker  She will be departing for the [facility] after New Year's Day     Spinal canal stenosis  Right L5 transforaminal epidural steroid injection July 14, 2022  Lumbar spine MRI June 16, 2022  Moderate lumbar spondylosis with broad-based disc herniations evident throughout the lumbar spine.  Disc bulging and facet degenerative changes resulting in severe spinal canal stenosis and lateral recess narrowing at L4-5 and moderate to severe spinal canal stenosis at L3-4.     Takes gabapentin 600 mg at bedtime (2 capsules of 300 mg each)     Right side piriformis syndrome.  After she had consultation with Dr. Sage Pickett at the , she was given a diagnosis of piriformis syndrome, and got an injection done at the  in January 2023 which was helpful  And since then has been doing physical therapy twice a week     But still bothered by pain with ambulation, so she is going to meet with [facility] to consider Botox injection to the chronically contracted muscles     Underwent sacroiliac joint injection on the right side October 14, 2022     Osteoporosis for which she has been on alendronate for approximately 4 years, stopping in 2019, restarted June 2021  Switch to Reclast infusion (zoledronic acid) administered August 31, 2023, which plan to give that for 2 to 3 years, then drug holiday.  Under supervision  of Dr. Gallardo (endocrinology)     [facility] endocrinology had recommended her to continue alendronate through 2020 to complete 5 years.  However she decided to stop it in 2019.  DEXA scan was May 2019 done at [facility] with a femoral neck T score of -2.5, lumbar spine T score -2.2.     Fariba 3 2021  1. The spine bone density L1-L2 with T-score -2.4.  2. Femoral bone densities show left total hip T- score -2.9 and right total hip T-score -2.7.  3. Trabecular bone score indicates poor trabecular bone architecture.     I reminded her about the importance of staying on her calcium and vitamin D supplements. She is no history of fractures.  She stays physically active.  Her gait is stable.     Gravelly voice  She has no pain in her throat or neck, and I do not palpate any abnormalities.  I told her 1 possibilities of vocal cord polyps, although she does not strain her vocal cords.  She has never had a smoking habit.  I told her that if she wants to pursue evaluation, that would be an otolaryngology consultation, which would include fiberoptic laryngoscopy.  She will let me know if she wants to proceed with that.     Bilateral epiphora, takes doxycycline low-dose 50 mg once a day, monitored at [place] [facility], Status post bilateral intraocular implants.       Earwax, I suggested to use an over-the-counter wax dissolving eardrop kit, example brand Debrox or Murine     Mild alopecia and spider veins      Insomnia for which he uses every day Ambien, but I really want her to cease alcohol consumption since she uses Ambien.  She told me that alcohol consumption is 1 glass of wine per day.      Recurrent herpes simplex labialis (cold sores) takes Valtrex 500 mg tablet intermittently.       She recall satisfactory screening mammogram done either March or April 2022 near [facility]     Colon polyp, 5 mm in the transverse seen on colonoscopy November 23, 2020.  Recheck in 5 years would be entirely optional.   She told me that the prep was really unpleasant, to the point that it made her vomit.  I told her that I think she could probably stop doing screening colonoscopy, especially since she also had a negative Cologuard on November 6, 2020     Immunizations up-to-date     COVID-19 Vaccine Bivalent Booster 18+ (Moderna) 10/29/2022      Pneumo Conj 13-V (2010&after) 11/01/2016   Pneumococcal 23 valent 08/28/2020      Influenza (High Dose) 3 valent vaccine 09/28/2020, 10/26/2022      Zoster vaccine recombinant adjuvanted (SHINGRIX)

## 2023-12-04 ENCOUNTER — TELEPHONE (OUTPATIENT)
Dept: OPHTHALMOLOGY | Facility: CLINIC | Age: 78
End: 2023-12-04
Payer: COMMERCIAL

## 2023-12-04 NOTE — TELEPHONE ENCOUNTER
Called and spoke to [facility]3] eye medical records     All eye imaging has to come from medical records - we can send notes but not imaging     Provided our medical records number to have those images released     Reina Nelson Communication Facilitator on 12/4/2023 at 12:56 PM

## 2023-12-04 NOTE — TELEPHONE ENCOUNTER
[facility] Call Center    Phone Message    May a detailed message be left on voicemail: yes     Reason for Call: Other: [facility], Rachelle is requesting a call back. She received the appt notes but no imaging and med rec stated radiology would have these. Caller is requesting to talk to Dr Cruz's staff. Thank you!      Action Taken: Message routed to:  [facility] eye    Travel Screening: Not Applicable

## 2023-12-22 DIAGNOSIS — G47.00 INSOMNIA, UNSPECIFIED TYPE: ICD-10-CM

## 2023-12-22 RX ORDER — ZOLPIDEM TARTRATE 10 MG/1
5 TABLET ORAL
Qty: 45 TABLET | Refills: 0 | Status: SHIPPED | OUTPATIENT
Start: 2023-12-22 | End: 2024-03-09

## 2023-12-22 NOTE — TELEPHONE ENCOUNTER
Refill request from pharmacy via fax for a refill of zolpidem tartrate 10 mg. Order pended. Please refill if appropriate.    Last OV with PCP: 12/01/2023  Next OV: None

## 2024-02-01 DIAGNOSIS — I10 ESSENTIAL HYPERTENSION: ICD-10-CM

## 2024-02-01 RX ORDER — AMLODIPINE BESYLATE 2.5 MG/1
2.5 TABLET ORAL 2 TIMES DAILY
Qty: 180 TABLET | Refills: 2 | Status: SHIPPED | OUTPATIENT
Start: 2024-02-01

## 2024-03-09 ENCOUNTER — MYC REFILL (OUTPATIENT)
Dept: INTERNAL MEDICINE | Facility: CLINIC | Age: 79
End: 2024-03-09
Payer: COMMERCIAL

## 2024-03-09 ENCOUNTER — HEALTH MAINTENANCE LETTER (OUTPATIENT)
Age: 79
End: 2024-03-09

## 2024-03-09 DIAGNOSIS — G47.00 INSOMNIA, UNSPECIFIED TYPE: ICD-10-CM

## 2024-03-11 RX ORDER — ZOLPIDEM TARTRATE 10 MG/1
5 TABLET ORAL
Qty: 45 TABLET | Refills: 0 | Status: SHIPPED | OUTPATIENT
Start: 2024-03-11 | End: 2024-06-17

## 2024-04-10 ENCOUNTER — MYC MEDICAL ADVICE (OUTPATIENT)
Dept: OPHTHALMOLOGY | Facility: CLINIC | Age: 79
End: 2024-04-10
Payer: COMMERCIAL

## 2024-04-16 NOTE — TELEPHONE ENCOUNTER
FUTURE VISIT INFORMATION      FUTURE VISIT INFORMATION:  Date: 7/16/24  Time: 9:30am  [place] [facility] [place]ERRAL INFORMATION:  Referring provider:  Dr. Lauro Cruz   Referring providers clinic:  [facility]  Reason for visit/diagnosis  Tear duct blockage    RECORDS REQUESTED FROM:       Clinic name Comments Records Status Imaging Status   [facility]chart/referral 4/10/24 [facility]    Imaging Mr orbits 9/13/23 epic

## 2024-04-19 ENCOUNTER — TELEPHONE (OUTPATIENT)
Dept: INTERNAL MEDICINE | Facility: CLINIC | Age: 79
End: 2024-04-19
Payer: COMMERCIAL

## 2024-04-19 NOTE — TELEPHONE ENCOUNTER
Incoming call from patient, states she is in [place], she is having a representative  (Leatha Delgado - Caregiver-) from [facility] pharmacy [address], unsure of what Pharmacy needs.    RN called Pharmacy, As per Pharmacist, Patient's insurance allowed initial 1-2 months geraldine period to have PA processed, looks like PA hasn't been submitted, next refills were ran through discount cards. - concluded a PA is needed now for patient's Zolpidem.     Telephone encounter created for PA.    RN called patient back to inform of the PA initiation. Aware it would take time. While waiting for PA response, she asked if she can pay for the medication through her representative as she is out of supply. RN unable to confirm this information and instructed patient to call Pharmacy and said will do. No further concerns at this time.

## 2024-04-25 ENCOUNTER — TELEPHONE (OUTPATIENT)
Dept: OPHTHALMOLOGY | Facility: CLINIC | Age: 79
End: 2024-04-25
Payer: COMMERCIAL

## 2024-04-25 NOTE — TELEPHONE ENCOUNTER
Spoke with patient regarding a sooner appointment from the wait-list. Patient was able to schedule as offered and remains on the wait-list. Patient will see appointment in MyChart.-Per Patient

## 2024-04-26 NOTE — TELEPHONE ENCOUNTER
FUTURE VISIT INFORMATION      FUTURE VISIT INFORMATION:  Date: 6/11/24  Time: 10:00am  [place] [facility] [place]  REFERRAL INFORMATION:  Referring provider:  Dr. Lauro Cruz   Referring providers clinic:  [facility]  Reason for visit/diagnosis  Tear duct blockage     RECORDS REQUESTED FROM:         Clinic name Comments Records Status Imaging Status   [facility]referral 4/10/24 [facility]     Imaging Mr orbits 9/13/23 epic

## 2024-04-29 NOTE — PATIENT INSTRUCTIONS
Dear Alma Michele    Thank you for choosing [facility] ([facility]) for your infusion.  The following information is a summary of our appointment as well as important reminders.      If you are a transplant patient and require transplant education, please click on this link: [email or web address].    We look forward in seeing you on your next appointment here at [facility] ([facility]).  Please don t hesitate to call us at [phone] to reschedule any of your appointments or to speak with one of the [facility] registered nurses.  It was a pleasure taking care of you today.    Sincerely,    [facility]  [address] [place] [address]  [id number]  Phone:  [phone]    Department of Emergency Medicine   ED  Provider Note  Admit Date/RoomTime: 4/29/2024  2:50 PM  ED Room: ST25/ST25        History of Present Illness:  Chief Complaint   Patient presents with    Vascular Access Problem     Patient bib ems for fall last week with dialysis port was ripped out of her right upper chest. Patient vss and nad.          Daphne Morris is a 70 y.o. female history of COPD, hypothyroidism, diabetes, hypertension, end-stage renal disease on dialysis presenting to the ED for evaluation of her dialysis port.  Patient had a fall on 4/27/2024 was seen evaluated in the emergency department at that time x-ray showed no large pneumothorax.,  Patient reports on Thursday after being discharged from the nursing home facility she reached down to pet her cat when she fell hitting her arm and her leg on the chair.  In the process for her dialysis for now.  She notified her dialysis team provider to come to the emergency department for evaluation which she did.  Then showed up Saturday for dialysis with report in her hand.  Was referred back to the emergency department for further evaluation and treatment with placement of a dialysis port.  And then concerns of need for dialysis.  Patient denies any chest pain does complain of increasing shortness of breath no increased weight gain.  No increased peripheral swelling.  Denies any fever chills no cough no wheezing.  She has a chronic runny nose.  She has multiple wounds to her arm buttocks legs.  She is unsure of what they look like the dressings have not been changed.  She is a poor historian.  Reports he is having difficulty getting around her house secondary to her balance issues.  Also complaining of weakness.    Review of Systems:   Pertinent positives and negatives are stated within HPI, all other systems reviewed and are negative.        --------------------------------------------- PAST HISTORY ---------------------------------------------  Past Medical  History:  has a past medical history of Anal fissure (10/12/2023), Anemia, ASHD (arteriosclerotic heart disease), At risk for falls, Atrial fibrillation (Multi), CHF (congestive heart failure) (Multi), CKD (chronic kidney disease), COPD (chronic obstructive pulmonary disease) (Multi), CVA (cerebral vascular accident) (Multi), Depression, Diabetes mellitus (Multi), GERD (gastroesophageal reflux disease), H/O pleural effusion, Hyperlipidemia, Hypertension, Hypothyroidism, Hypoxia, Impacted cerumen, left ear, Noncompliance, Osteoarthritis, Perianal dermatitis (10/12/2023), Personal history of other diseases of the respiratory system, PVD (peripheral vascular disease) (CMS-HCC), Renal carcinoma, right (Multi), Respiratory failure (Multi), TIA (transient ischemic attack), Vasculitis (CMS-HCC) (10/12/2023), and Weakness.  Past Surgical History:  has a past surgical history that includes MR angio chest w and wo IV contrast (2023); MR angio head wo IV contrast (2021);  section, classic; Shoulder surgery; Ankle surgery; Cataract extraction (Right); Nephrectomy (2021); and Cardiac catheterization (N/A, 2024).  Social History:  reports that she has quit smoking. Her smoking use included cigarettes. She started smoking about 55 years ago. She has a 27.7 pack-year smoking history. She has never been exposed to tobacco smoke. She has never used smokeless tobacco. She reports that she does not currently use alcohol. She reports current drug use. Drug: Marijuana.  Family History: family history includes Asthma in her daughter; COPD in an other family member; Cancer in her brother and sister; Diabetes in her daughter, father, maternal grandfather, and another family member; Heart attack in her daughter; Heart disease in her father and paternal grandmother; Hypertension in her mother and another family member; chronic lung disease in an other family member.. Unless otherwise noted, family history is non  contributory  The patient’s home medications have been reviewed.  Allergies: Bee venom protein (honey bee), Citalopram, Codeine, Influenza virus vaccines, Latex, Lisinopril, Adhesive tape-silicones, Amoxicillin, Doxycycline, Oseltamivir, Phenyleph-min oil-petrolatum, Phenyleph-shark oil-glyc-pet, Phenylephrine, Prednisone, Tuberculin ppd, and Xylometazoline        ---------------------------------------------------PHYSICAL EXAM--------------------------------------    GENERAL APPEARANCE: Awake and alert.   VITAL SIGNS: As per the nurses' triage record.  Pulse ox 93%  HEENT: Normocephalic, atraumatic.  No raccoon eyes or scherer signs noted extraocular muscles are intact. Pupils equal round and reactive to light. Conjunctiva are pink. Negative scleral icterus. Mucous membranes are moist. Tongue in the midline. Pharynx was without erythema or exudates, uvula midline  NECK: Soft Nontender and supple, full gross ROM, no meningeal signs.  No pain palpation of the cervical spine no step-offs crepitus or bruising no trismus noted.  No nuchal rigidity noted  CHEST: Nontender to palpation. Clear to auscultation bilaterally.  No pain palpation of the chest wall no redness or warmth.  No erythema noted at the site of dialysis port.  No crepitus felt fine crackles bilateral bases  HEART: S1, S2. Regular rate and rhythm. No murmurs, gallops or rubs.  Strong and equal pulses in the extremities.   ABDOMEN: Soft, nontender, nondistended, positive bowel sounds, no palpable masses.  MUSCULCSKELETAL:   Right upper extremity: Limited range of motion secondary to pain appears symmetrical to the left shoulder.  Abrasion noted to the upper distal forearm anterior scabbed over dried blood.  No pain palpation manipulation of the elbow.  Full range of motion of the elbow.  Skin tear to the distal forearm proximal to the wrist no purulent drainage or odor no lymphangitic streaking no erythema.  Generalized edema noted to the right upper  "extremity radial pulse strong and intact cap refill less than 2 handgrip is strong hand spread is strong.  Mild erythema noted to the upper arm.   Left upper extremity patient has full range of motion no difficulty moving the arm.  Radial pulse strong and intact  Right lower extremity distal to the knee generalized edema circumferential erythema warm to touch large open wound with purulent drainage approximately 5 x 3 area foul odor.  Peripheral pulses intact.  Edema extending into the ankle.   Left lower extremity open wound to the shin proximately 1 cm in length.  No purulent drainage slight erythema surrounding the wound.  No Redness noted or tenderness noted.  Trace edema  Back: No pain palpation of the cervical thoracic or lumbar spine no step-offs crepitus or bruising noted.  Buttocks patient has abrasion across the right buttocks that is erythematous no drainage noted.  No pustules noted.  Patient has difficulty standing unable to stand without help.  NEUROLOGICAL: Awake, alert and oriented x 3. Power intact in the upper and lower extremities. Sensation is intact to light touch in the upper and lower extremities.   IMMUNOLOGICAL: No lymphatic streaking noted   DERM: No petechiae, rashes, or ecchymoses.          ------------------------- NURSING NOTES AND VITALS REVIEWED ---------------------------  The nursing notes within the ED encounter and vital signs as below have been reviewed by myself  BP 96/80   Pulse 64   Temp 36.8 °C (98.2 °F) (Temporal)   Resp 17   Ht 1.676 m (5' 6\")   Wt 75.8 kg (167 lb)   SpO2 95%   BMI 26.95 kg/m²     Oxygen Saturation Interpretation: 93%      The patient’s available past medical records and past encounters were reviewed.          -----------------------DIAGNOSTIC RESULTS------------------------  LABS:    Labs Reviewed   CBC WITH AUTO DIFFERENTIAL - Abnormal       Result Value    WBC 10.8      nRBC 0.2 (*)     RBC 3.73 (*)     Hemoglobin 10.9 (*)     Hematocrit 37.9      "  (*)     MCH 29.2      MCHC 28.8 (*)     RDW 16.5 (*)     Platelets 273      Neutrophils % 69.5      Immature Granulocytes %, Automated 0.5      Lymphocytes % 19.8      Monocytes % 9.5      Eosinophils % 0.1      Basophils % 0.6      Neutrophils Absolute 7.53      Immature Granulocytes Absolute, Automated 0.05      Lymphocytes Absolute 2.14      Monocytes Absolute 1.03 (*)     Eosinophils Absolute 0.01      Basophils Absolute 0.07     COMPREHENSIVE METABOLIC PANEL - Abnormal    Glucose 216 (*)     Sodium 136      Potassium 4.9      Chloride 97      Bicarbonate 26      Urea Nitrogen 53 (*)     Creatinine 3.60 (*)     eGFR 13 (*)     Calcium 8.3 (*)     Albumin 3.2 (*)     Alkaline Phosphatase 108      Total Protein 6.8      AST 18      Bilirubin, Total 0.2      ALT 14      Anion Gap 13     N-TERMINAL PROBNP - Abnormal    PROBNP 29,640 (*)     Narrative:     Reference ranges are based on clinical submission data. These ranges represent the 95th percentile of normal cut-off points. As NT Pro- BNP values approach 1000 pg/ml, clinical symptoms are more likely associated with CHF.   URINALYSIS WITH REFLEX CULTURE AND MICROSCOPIC - Abnormal    Color, Urine Yellow      Appearance, Urine Ex.Turbid (*)     Specific Gravity, Urine 1.023      pH, Urine 6.0      Protein, Urine 600 (3+) (*)     Glucose, Urine 100 (1+) (*)     Blood, Urine 0.1 (1+) (*)     Ketones, Urine NEGATIVE      Bilirubin, Urine NEGATIVE      Urobilinogen, Urine Normal      Nitrite, Urine NEGATIVE      Leukocyte Esterase, Urine 250 David/µL (*)    MICROSCOPIC ONLY, URINE - Abnormal    WBC, Urine >50 (*)     WBC Clumps, Urine FEW      RBC, Urine >20 (*)     Squamous Epithelial Cells, Urine 10-25 (FEW)      Bacteria, Urine 3+ (*)     Budding Yeast, Urine PRESENT (*)     Mucus, Urine FEW     BLOOD GAS LACTIC ACID, VENOUS - Normal    POCT Lactate, Venous 1.6     BLOOD CULTURE   BLOOD CULTURE   TISSUE/WOUND CULTURE/SMEAR   URINE CULTURE   URINALYSIS WITH  REFLEX CULTURE AND MICROSCOPIC    Narrative:     The following orders were created for panel order Urinalysis with Reflex Culture and Microscopic.  Procedure                               Abnormality         Status                     ---------                               -----------         ------                     Urinalysis with Reflex C...[[id number]]  Abnormal            Final result               Extra Urine Gray Tube[[id number]]                                                         Please view results for these tests on the individual orders.   EXTRA URINE GRAY TUBE       As interpreted by me, the above displayed labs are abnormal. All other labs obtained during this visit were within normal range or not returned as of this dictation.      EKG Interpretation  1649 EKG independent or potation: Normal sinus rhythm 60 bpm, left axis deviation, wide QRS at 122 ms with left and right morphology, no acute ST or T wave abnormalities [ASHLEIGH]           XR chest 2 views   Final Result   Cardiomegaly with mild pulmonary vascular congestion. Attention on   continued follow-up is advised.        Small right pleural effusion and atelectasis, slightly improved from   prior exam. Superimposed infection not excluded. Correlate clinically.        MACRO:   None        Signed by: Rebeka James 4/29/2024 6:19 PM   Dictation workstation:   [id number]      XR tibia fibula right 2 views   Final Result   No acute fracture. Generalized diffuse osteopenia.        Postsurgical and degenerative changes as noted above. Diffuse soft   tissue swelling.        MACRO:   None        Signed by: Rebeka James 4/29/2024 6:25 PM   Dictation workstation:   [id number]      XR forearm right 2 views   Final Result   No acute fracture. Diffuse soft tissue swelling.             MACRO:   None        Signed by: Rebeka James 4/29/2024 6:22 PM   Dictation workstation:   [id number]      XR shoulder right 2+ views   Final Result   No acute fracture.  Generalized diffuse osteopenia.        Mild right shoulder osteoarthrosis.        MACRO:   None        Signed by: Rebeka James 4/29/2024 6:21 PM   Dictation workstation:   [id number]      Vascular US upper extremity venous duplex right   Final Result   No thrombus in the central veins of the  right upper extremity.        Findings suggestive of a complex cyst in the right forearm/elbow   crease.        MACRO:   None        Signed by: Danny Valle 4/29/2024 5:50 PM   Dictation workstation:   [id number]      Lower extremity venous duplex right   Final Result   No deep venous thrombosis of the visualized right lower extremity.        MACRO:   None        Signed by: Danny Valle 4/29/2024 5:48 PM   Dictation workstation:   [id number]              XR chest 2 views   Final Result   Cardiomegaly with mild pulmonary vascular congestion. Attention on   continued follow-up is advised.        Small right pleural effusion and atelectasis, slightly improved from   prior exam. Superimposed infection not excluded. Correlate clinically.        MACRO:   None        Signed by: Rebeka James 4/29/2024 6:19 PM   Dictation workstation:   [id number]      XR tibia fibula right 2 views   Final Result   No acute fracture. Generalized diffuse osteopenia.        Postsurgical and degenerative changes as noted above. Diffuse soft   tissue swelling.        MACRO:   None        Signed by: Rebeka James 4/29/2024 6:25 PM   Dictation workstation:   [id number]      XR forearm right 2 views   Final Result   No acute fracture. Diffuse soft tissue swelling.             MACRO:   None        Signed by: Rebeka James 4/29/2024 6:22 PM   Dictation workstation:   [id number]      XR shoulder right 2+ views   Final Result   No acute fracture. Generalized diffuse osteopenia.        Mild right shoulder osteoarthrosis.        MACRO:   None        Signed by: Rebeka James 4/29/2024 6:21 PM   Dictation workstation:   [id number]      Vascular  US upper extremity venous duplex right   Final Result   No thrombus in the central veins of the  right upper extremity.        Findings suggestive of a complex cyst in the right forearm/elbow   crease.        MACRO:   None        Signed by: Danny Valle 4/29/2024 5:50 PM   Dictation workstation:   [id number]      Lower extremity venous duplex right   Final Result   No deep venous thrombosis of the visualized right lower extremity.        MACRO:   None        Signed by: Danny Valle 4/29/2024 5:48 PM   Dictation workstation:   [id number]              ------------------------------ ED COURSE/MEDICAL DECISION MAKING----------------------  Medical Decision Making:   Exam: A medically appropriate exam performed, outlined above, given the known history and presentation.    History obtained from: Review of medical record nursing notes patient dialysis center      Social Determinants of Health considered during this visit: Lives by herself discharged from nursing home on Thursday and then fell upon      PAST MEDICAL HISTORY/Chronic Conditions Affecting Care     has a past medical history of Anal fissure (10/12/2023), Anemia, ASHD (arteriosclerotic heart disease), At risk for falls, Atrial fibrillation (Multi), CHF (congestive heart failure) (Multi), CKD (chronic kidney disease), COPD (chronic obstructive pulmonary disease) (Multi), CVA (cerebral vascular accident) (Multi), Depression, Diabetes mellitus (Multi), GERD (gastroesophageal reflux disease), H/O pleural effusion, Hyperlipidemia, Hypertension, Hypothyroidism, Hypoxia, Impacted cerumen, left ear, Noncompliance, Osteoarthritis, Perianal dermatitis (10/12/2023), Personal history of other diseases of the respiratory system, PVD (peripheral vascular disease) (CMS-HCC), Renal carcinoma, right (Multi), Respiratory failure (Multi), TIA (transient ischemic attack), Vasculitis (CMS-HCC) (10/12/2023), and Weakness.       CC/HPI Summary, Social Determinants of  health, Records Reviewed, DDx, testing done/not done, ED Course, Reassessment, disposition considerations/shared decision making with patient, consults, disposition:   Patient presents with replacement of her dialysis catheter  Found to have cellulitis of the right lower extremity infected wounds generalized weakness  On clinical exam  Plan  Meropenem  Vancomycin  Right shoulder x-ray No acute fracture. Generalized diffuse osteopenia.      Mild right shoulder osteoarthrosis.      Chest x-ray Cardiomegaly with mild pulmonary vascular congestion. Attention on  continued follow-up is advised.      Small right pleural effusion and atelectasis, slightly improved from  prior exam. Superimposed infection not excluded. Correlate clinically.  Right forearm x-ray No acute fracture. Diffuse soft tissue swelling.      Right tib-fib x-ray No acute fracture. Generalized diffuse osteopenia.   Postsurgical and degenerative changes as noted above. Diffuse soft  tissue swelling.  EKG  Vascular right upper extremity No thrombus in the central veins of the  right upper extremity.      Findings suggestive of a complex cyst in the right forearm/elbow  crease.  Vascular right lower extremity  Blood cultures  Lactic acid  CBC  CMP  proBNP  Urine  Wound culture  Medical Decision Making/Differential Diagnosis:  Differentials include but not limited to cellulitis versus electrolyte abnormality versus placement of dialysis catheter for dialysis versus UTI versus worsening renal function versus versus fluid overload congestive heart failure  Urine 250 leukocytes greater than 50 WBCs yeast +3 bacteria  proBNP 29,640  Lactic acid 1.6  Glucose 216  Electrolytes within normal limits except calcium low at 8.3  BUN 53  Creatinine 3.6  LFTs within normal limits  Leukocytes 10.8  Hemoglobin 10.9 no signs of active bleeding  Patient presented to have her dialysis catheter replaced.  Patient did miss dialysis Saturday.  Chest x-ray showed cardiomegaly  with mild pulmonary vascular congestion small right pleural effusion atelectasis improved from prior exam.  Discussed case with patient's nephrologist advised to transfer to [facility] and replace the dialysis catheter.  Was notified by her dialysis team that she has a consultation for fistula but this has not been confirmed yet.  Electrolytes within normal limits except calcium low.  proBNP elevated at 29,640.  Glucose 216 no signs of DKA creatinine 3.6 BUN 53 elevated from baseline.  EKG per attending note no ST elevation noted.  Normal sinus rhythm.  Patient no complaints of chest pain.  On exam has mild crackles in the bases of the lungs but no signs of respiratory distress she is not hypoxic or tachypneic.  LFTs within normal limits no elevation white blood cell count mild anemia no signs of active bleeding urine consistent with UTI leukocytes to 50 WBCs greater than 50.  +3 bacteria yeast patient has multiple wounds secondary to her fall on Thursday difficulty ambulating abrasion to the buttocks infected wound right lower extremity with surrounding cellulitis placed on vancomycin and meropenem.  Which will also cover for UTI Doppler study of the right lower leg showed No deep venous thrombosis of the visualized right lower extremity.  Wound care performed by nursing wound culture obtained peripheral pulses intact.  Consider wound care team consult x-ray of the tib-fib showed no acute fracture with generalized osteopenia and edema.  Limited range of motion of the right upper arm x-ray showed no acute fracture osteopenia ultrasound of the upper extremity showed cyst with edema.  Multiple wounds to the arm as well with some mild erythema.  Patient seen and evaluated with attending physician Dr. Lujan  Blood pressure noted to be 96/88 patient has significant wounds with infection concern for sepsis blood cultures are pending at this time.  Wound culture pending.  Urine is consistent with UTI culture pending.   Patient has high risk for fluid overload she is a dialysis patient missed dialysis on Saturday has not reported to receive dialysis fluid overload noted on lungs.  Will give a 10 cc bolus which is detrimental to the patient at this time.  Will monitor closely for septic shock  PROCEDURES  Unless otherwise noted below, none      CONSULTS:   IP CONSULT TO NEPHROLOGY  Consult placed for interventional radiology for dialysis port placement admit hospitalist service    ED Course as of 04/30/24 0151   Mon Apr 29, 2024   1554 Notified by dialysis treatment place Tam the patient was sent into the emergency department on Friday to have a vascular access placed for her dialysis to be obtained on Saturday.  Reports she came to the emergency department and was discharged.  Does have a consultation for fistula but this has not been decided yet.  Was sent back to the emergency department today to vascular access placed due to need for dialysis.  Patient did miss 1 day of her dialysis on Saturday. [TB]   1649 EKG independent or potation: Normal sinus rhythm 60 bpm, left axis deviation, wide QRS at 122 ms with left and right morphology, no acute ST or T wave abnormalities [ASHLEIGH]   1756 Discussed case with patient's nephrologist Dr. Mitchell .  Advised that patient will need to be transferred to [facility] to have IR place a catheter for her to receive dialysis.  And he will see the patient in consult. [TB]   1809 Transfer team called to clarify need for interventional radiology for the dialysis catheter placement.  Advised that this was the recommendation of her nephrologist.  For [facility].  Will return call when appropriate consult team available to discuss dialysis catheter placement [TB]   1834 Lower extremity venous duplex right [TB]   1947 Spoke with transfer team.  Confirmed that IR could put in the dialysis catheter.  Discussed possible boomerang due to boarding at [facility] advised to speak to IR physician Dr. Sharif  Radames.  If he is agreeable to perform the procedures would recommend talking to the nursing supervisor to arrange that boomerang procedure.  And then admit to the admitting physician at [facility]. [TB]      ED Course User Index  [ASHLEIGH] Roopa Lujan MD  [TB] GREGORIA Joshua         Diagnoses as of 04/30/24 0151   Cellulitis of right lower extremity   Chronic kidney disease, stage V (Multi)   Unspecified open wound, right lower leg, initial encounter   Open wound of right forearm, initial encounter   Open wound of right upper arm, initial encounter   Open wound of left lower leg, initial encounter   Infected abrasion of buttock, initial encounter   Acute cystitis with hematuria         This patient has remained hemodynamically stable during their ED course.      Critical Care:    Critical Care    Performed by: GREGORIA Joshua  Authorized by: Roopa Lujan MD    Critical care provider statement:     Critical care time (minutes):  31    Critical care time was exclusive of:  Separately billable procedures and treating other patients and teaching time    Critical care was necessary to treat or prevent imminent or life-threatening deterioration of the following conditions:  Sepsis    Critical care was time spent personally by me on the following activities:  Blood draw for specimens, development of treatment plan with patient or surrogate, discussions with consultants, discussions with primary provider, evaluation of patient's response to treatment, examination of patient, interpretation of cardiac output measurements, obtaining history from patient or surrogate, review of old charts, re-evaluation of patient's condition, pulse oximetry, ordering and review of radiographic studies, ordering and review of laboratory studies and ordering and performing treatments and interventions  Concern for sepsis blood pressure 96/80 blood cultures pending urine cultures pending urine culture pending cellulitis  with UTI urine culture pending.  Lactic acid 1.6.  Patient is high risk for fluid overload edema noted in the lungs.  proBNP is elevated  Patient does not have a dialysis catheter due to home dialysis with missed dialysis.  10 cc fluid bolus given detrimental to patient's care at this time      Counseling:  The emergency provider has spoken with the patient and discussed today’s results, in addition to providing specific details for the plan of care and counseling regarding the diagnosis and prognosis.  Questions are answered at this time and they are agreeable with the plan.         --------------------------------- IMPRESSION AND DISPOSITION ---------------------------------    IMPRESSION  1. Cellulitis of right lower extremity    2. Chronic kidney disease, stage V (Multi)    3. Unspecified open wound, right lower leg, initial encounter    4. Open wound of right forearm, initial encounter    5. Open wound of right upper arm, initial encounter    6. Open wound of left lower leg, initial encounter    7. Infected abrasion of buttock, initial encounter    8. Acute cystitis with hematuria        DISPOSITION  Disposition: Admit hospitalist service  Patient condition is stable        NOTE: This report was transcribed using voice recognition software. Every effort was made to ensure accuracy; however, inadvertent computerized transcription errors may be present      CARLENE Joshua-FELICIA  04/30/24 0152

## 2024-05-02 NOTE — TELEPHONE ENCOUNTER
Central Prior Authorization Team - Phone: [phone]     PA Initiation    Medication: ZOLPIDEM TARTRATE 10 MG PO TABS  Insurance Company: [employer] - Phone [phone] Fax [phone]  Pharmacy Filling the Rx: [id number] - [place], [place] - [address]  Filling Pharmacy Phone: [phone]  Filling Pharmacy Fax:    Start Date: 5/2/2024

## 2024-05-03 NOTE — TELEPHONE ENCOUNTER
Central Prior Authorization Team - Phone: [phone]     Prior Authorization Approval    Medication: ZOLPIDEM TARTRATE 10 MG PO TABS  Authorization Effective Date: 4/18/2024  Authorization Expiration Date: 12/31/2024 or until further notice  Approved Dose/Quantity: 45  Reference #:     Insurance Company: [employer] - Phone [phone] Fax [phone]  Expected CoPay: $    CoPay Card Available:      Financial Assistance Needed:   Which Pharmacy is filling the prescription: [id number] - [place] - [address] [place] [address]  Pharmacy Notified: YES  Patient Notified: YES pharmacy will notify when ready

## 2024-05-15 ENCOUNTER — TRANSFERRED RECORDS (OUTPATIENT)
Dept: HEALTH INFORMATION MANAGEMENT | Facility: CLINIC | Age: 79
End: 2024-05-15
Payer: COMMERCIAL

## 2024-06-10 ENCOUNTER — ANCILLARY PROCEDURE (OUTPATIENT)
Dept: BONE DENSITY | Facility: CLINIC | Age: 79
End: 2024-06-10
Attending: INTERNAL MEDICINE
Payer: MEDICARE

## 2024-06-10 PROCEDURE — 77080 DXA BONE DENSITY AXIAL: CPT | Mod: TC | Performed by: PHYSICIAN ASSISTANT

## 2024-06-10 PROCEDURE — 77089 TBS DXA CAL W/I&R FX RISK: CPT | Performed by: PHYSICIAN ASSISTANT

## 2024-06-11 ENCOUNTER — PRE VISIT (OUTPATIENT)
Dept: OPHTHALMOLOGY | Facility: CLINIC | Age: 79
End: 2024-06-11

## 2024-06-11 ENCOUNTER — OFFICE VISIT (OUTPATIENT)
Dept: OPHTHALMOLOGY | Facility: CLINIC | Age: 79
End: 2024-06-11
Payer: MEDICARE

## 2024-06-11 DIAGNOSIS — H04.551 ACQUIRED OBSTRUCTION OF RIGHT NASOLACRIMAL DUCT: Primary | ICD-10-CM

## 2024-06-11 DIAGNOSIS — H02.403 PTOSIS OF BOTH EYELIDS: ICD-10-CM

## 2024-06-11 DIAGNOSIS — H02.834 DERMATOCHALASIS OF BOTH UPPER EYELIDS: ICD-10-CM

## 2024-06-11 DIAGNOSIS — H53.8 BLURRED VISION: ICD-10-CM

## 2024-06-11 DIAGNOSIS — H02.831 DERMATOCHALASIS OF BOTH UPPER EYELIDS: ICD-10-CM

## 2024-06-11 PROCEDURE — 68810 PROBE NASOLACRIMAL DUCT: CPT | Mod: RT | Performed by: OPHTHALMOLOGY

## 2024-06-11 PROCEDURE — 99214 OFFICE O/P EST MOD 30 MIN: CPT | Mod: 25 | Performed by: OPHTHALMOLOGY

## 2024-06-11 PROCEDURE — 31231 NASAL ENDOSCOPY DX: CPT | Mod: 59 | Performed by: OPHTHALMOLOGY

## 2024-06-11 ASSESSMENT — VISUAL ACUITY
OD_PH_SC+: -1
OD_SC: 20/60
METHOD: SNELLEN - LINEAR
OD_PH_SC: 20/50
OS_SC+: -2
OD_SC+: -1
OS_SC: 20/25

## 2024-06-11 ASSESSMENT — CONF VISUAL FIELD
METHOD: COUNTING FINGERS
OD_SUPERIOR_TEMPORAL_RESTRICTION: 0
OS_NORMAL: 1
OS_INFERIOR_TEMPORAL_RESTRICTION: 0
OD_INFERIOR_NASAL_RESTRICTION: 0
OD_SUPERIOR_NASAL_RESTRICTION: 0
OS_SUPERIOR_NASAL_RESTRICTION: 0
OS_INFERIOR_NASAL_RESTRICTION: 0
OD_NORMAL: 1
OD_INFERIOR_TEMPORAL_RESTRICTION: 0
OS_SUPERIOR_TEMPORAL_RESTRICTION: 0

## 2024-06-11 ASSESSMENT — SLIT LAMP EXAM - LIDS: COMMENTS: BLEPHARITIS, MGD

## 2024-06-11 ASSESSMENT — EXTERNAL EXAM - RIGHT EYE: OD_EXAM: NORMAL

## 2024-06-11 ASSESSMENT — TONOMETRY
OS_IOP_MMHG: 9
IOP_METHOD: ICARE
OD_IOP_MMHG: 9

## 2024-06-11 ASSESSMENT — EXTERNAL EXAM - LEFT EYE: OS_EXAM: NORMAL

## 2024-06-11 NOTE — LETTER
2024         RE:  :  MRN: Alma Michele  1945  [id number]     Dear Dr. Lauro Cruz,    Thank you for asking me to see your patient, Alma Michele, for an oculoplastic   consultation.  My assessment and plan are below.  For further details, please see my attached clinic note.      Oculoplastic Clinic New Patient    Patient: Alma Michele MRN# [id number]   YOB: 1945 Age: 79 year old   Date of Visit: 2024    CC: Tearing       HPI:     Alma Michele is a 79 year old female who has noted tearing from the right eye. There is no history of trauma to the face, significant sinusitis, or sinus tumors. Non-smoker (only tried smoking briefly in college). No history of chemo or radiation.The eye does not feel dry and irritated. The tears run down the cheeks, and obscure vision interfering with activities of daily living.          Assessment and Plan:       ICD-10-CM    1. Acquired obstruction of right nasolacrimal duct  H04.551       2. 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 Irrigation of lower punctum with fluid expressing from upper puntum suggestive of NLDO.    Blurred vision right eye, history of Epiretinal membrane peel 2022. Had further workup with Dr. Cruz.     Plan:  External Dacryocystorhinostomy (DCR) right side      Again, thank you for allowing me to participate in the care of your patient.      Sincerely,    Felicia Bai MD  Department of Ophthalmology and Visual Neurosciences  [facility]    CC: Andrea Cruz MD  [address] [place] [address]  Via In Basket     Lauro Cruz MD  [address] [address]  Via In Basket

## 2024-06-11 NOTE — PROGRESS NOTES
Oculoplastic Clinic New Patient    Patient: Alma Michele MRN# [id number]   YOB: 1945 Age: 79 year old   Date of Visit: Jun 11, 2024    CC: Tearing       HPI:     Alma Michele is a 79 year old female who has noted tearing from the right eye. There is no history of trauma to the face, significant sinusitis, or sinus tumors. Non-smoker (only tried smoking briefly in college). No history of chemo or radiation.The eye does not feel dry and irritated. The tears run down the cheeks, and obscure vision interfering with activities of daily living.          Assessment and Plan:       ICD-10-CM    1. Acquired obstruction of right nasolacrimal duct  H04.551       2. 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 Irrigation of lower punctum with fluid expressing from upper puntum suggestive of NLDO.    Blurred vision right eye, history of Epiretinal membrane peel 12/6/2022. Had further workup with Dr. Cruz.     Plan:  External Dacryocystorhinostomy (DCR) right side              Attending Physician Attestation: Complete documentation of historical and exam elements from today's encounter can be found in the full encounter summary report (not reduplicated in this progress note). I personally obtained the chief complaint(s) and history of present illness. I confirmed and edited as necessary the review of systems, past medical/surgical history, family history, social history, and examination findings as documented by others; and I examined the patient myself. I personally reviewed the relevant tests, images, and reports as documented above. I formulated and edited as necessary the assessment and plan and discussed the findings and management plan with the patient. Felicia Bai MD      Today with Alma Michele I reviewed the indications, risks, benefits, and alternatives of the  "proposed surgical procedure including, but not limited to, failure obtain the desired result  and need for additional surgery, bleeding, infection, loss of vision, loss of the eye, and the remote possibility of permanent damage to any organ system or death. Dacryocystorhinostomy can fail due to closure of the ostium, or scarring anywhere along the path of outflow. This may necessitate more surgery, and sometimes we cannot \"cure\" tearing. Other rare risks include CSF leak, and sinus infections. I provided multiple opportunities for the questions, answered all questions to the best of my ability, and confirmed that my answers and my discussion were understood. Felicia Bai MD            "

## 2024-06-11 NOTE — NURSING NOTE
Chief Complaints and History of Present Illnesses   Patient presents with    Consult For     Alma Michele is being seen for a consult today by the request of Dr. Cruz for a possible blocked tear duct right eye.      Chief Complaint(s) and History of Present Illness(es)       Consult For              Laterality: right eye    Associated symptoms: discharge.  Negative for eye pain    Treatments tried: ointment    Pain scale: 0/10    Comments: Alma Michele is being seen for a consult today by the request of Dr. Cruz for a possible blocked tear duct right eye.               Comments    For several years has been having issues with excessive watering and mucus discharge with right eye.   No eye pain but uncomfortable at times waking in the morning due to the mucus and lids stuck together but no painful.     Alexis ointment Q HS each eye.     DAVID Ramires COT 10:06 AM June 11, 2024

## 2024-06-12 ENCOUNTER — TELEPHONE (OUTPATIENT)
Dept: OPHTHALMOLOGY | Facility: CLINIC | Age: 79
End: 2024-06-12
Payer: COMMERCIAL

## 2024-06-12 NOTE — TELEPHONE ENCOUNTER
Called patient to schedule surgery with Dr. Bai    Spoke with: Alma    Date of Surgery: 8-22-24      Patient aware of approximate arrival time: No at Pt will get a call a couple of days prior     Location of surgery: [facility]     Pre-Op H&P: [facility] at [facility]     Informed patient that they need to call to schedule pre-op H&P with PCP within 30 days of surgery date: Yes    Post-Op Appt Dates: 9-10-24 [facility]     Discussed with patient pre-op RN will call 2-3 days prior to surgery with arrival time and instructions:  Yes    Discussed with patient PAC RN will provide arrival time and instructions for surgery at the time of the appointment: [[place] locations only]: No      Standard Surgery Packet Sent: Yes 06/12/24  via Mail - Standard      Surgical Soap Discussed with Patient: Yes  - Received:  Local Pharmacy       Additional Information Sent in Packet:          Informed patient that they will need an adult  to bring patient home from surgery: Yes  : - Phong         Additional Comments:        All patients questions were answered and was instructed to review surgical packet and call back [phone] with any questions or concerns.       Terri Grier on 6/12/2024 at 10:01 AM

## 2024-06-17 DIAGNOSIS — G47.00 INSOMNIA, UNSPECIFIED TYPE: ICD-10-CM

## 2024-06-17 RX ORDER — ZOLPIDEM TARTRATE 10 MG/1
5 TABLET ORAL
Qty: 45 TABLET | Refills: 0 | Status: SHIPPED | OUTPATIENT
Start: 2024-06-17 | End: 2024-08-14

## 2024-06-18 NOTE — PROGRESS NOTES
06/18/24 12:07 PM  PATIENT LAB/IMAGING STATUS : MyChart sent to patient to complete standing/future orders   06/19/24 11:46 AM  PATIENT LAB/IMAGING STATUS : Orders completed / No further action needed   Appointment reminder phone call made to patient.

## 2024-06-19 ENCOUNTER — LAB (OUTPATIENT)
Dept: LAB | Facility: CLINIC | Age: 79
End: 2024-06-19
Payer: MEDICARE

## 2024-06-19 DIAGNOSIS — M81.0 AGE-RELATED OSTEOPOROSIS WITHOUT CURRENT PATHOLOGICAL FRACTURE: ICD-10-CM

## 2024-06-19 LAB — VIT D+METAB SERPL-MCNC: 55 NG/ML (ref 20–50)

## 2024-06-19 PROCEDURE — 82306 VITAMIN D 25 HYDROXY: CPT

## 2024-06-19 PROCEDURE — 36415 COLL VENOUS BLD VENIPUNCTURE: CPT

## 2024-06-27 ENCOUNTER — OFFICE VISIT (OUTPATIENT)
Dept: ENDOCRINOLOGY | Facility: CLINIC | Age: 79
End: 2024-06-27
Payer: MEDICARE

## 2024-06-27 ENCOUNTER — LAB (OUTPATIENT)
Dept: LAB | Facility: CLINIC | Age: 79
End: 2024-06-27
Payer: MEDICARE

## 2024-06-27 VITALS
SYSTOLIC BLOOD PRESSURE: 144 MMHG | HEART RATE: 67 BPM | WEIGHT: 104 LBS | BODY MASS INDEX: 17.75 KG/M2 | HEIGHT: 64 IN | DIASTOLIC BLOOD PRESSURE: 83 MMHG | OXYGEN SATURATION: 100 %

## 2024-06-27 DIAGNOSIS — M81.0 AGE-RELATED OSTEOPOROSIS WITHOUT CURRENT PATHOLOGICAL FRACTURE: ICD-10-CM

## 2024-06-27 DIAGNOSIS — Z78.0 POSTMENOPAUSAL STATUS: ICD-10-CM

## 2024-06-27 DIAGNOSIS — M81.0 AGE-RELATED OSTEOPOROSIS WITHOUT CURRENT PATHOLOGICAL FRACTURE: Primary | ICD-10-CM

## 2024-06-27 DIAGNOSIS — Z79.83 ENCOUNTER FOR MONITORING ZOLEDRONIC ACID THERAPY: ICD-10-CM

## 2024-06-27 DIAGNOSIS — Z51.81 ENCOUNTER FOR MONITORING ZOLEDRONIC ACID THERAPY: ICD-10-CM

## 2024-06-27 PROCEDURE — G2211 COMPLEX E/M VISIT ADD ON: HCPCS | Performed by: INTERNAL MEDICINE

## 2024-06-27 PROCEDURE — 99213 OFFICE O/P EST LOW 20 MIN: CPT | Mod: GC | Performed by: INTERNAL MEDICINE

## 2024-06-27 ASSESSMENT — PAIN SCALES - GENERAL: PAINLEVEL: NO PAIN (0)

## 2024-06-27 NOTE — PATIENT INSTRUCTIONS
"Reduce Vitamin D to 1,000 international units once daily  Calcium carbonate 600 mg (elemental) once daily        Infusion    [facility] [phone]   [place] [phone]   [place] [phone]   [place] [phone]   [place] [phone]   [place] [phone]   [facility] [phone]     For any questions, please reach out to the [facility] Number for assistance: [phone].       24-Hour Urine Collection Guidelines for Patients    It does not matter what time of day you start the clock.  Make the collection time when it will be the most convenient for you.  If you miss adding any urine during the 24-hour period or if the container spills, you will need to start over.    If you think you will need more than one container during the 24-hour period, call the Lab for an additional container or continue collecting the urine in a clean (non-bleached), plastic container you have at home.    Please verify that the container is correctly labeled with your name, and your medical record number or birthdate.      1. Empty your bladder completely.  Do not save any of that urine.  Note the time, as this will be the \"START\" time of the 24-hour collection period.  2. Save ALL urine passed in the next 24 hours, adding each specimen to the collection container.  Keep the container refrigerated during the collection.  3. At the end of the 24-hour period, empty your bladder (even if you don't feel as if you need to urinate) and add it to the container.  4. When complete, bring the container to the Laboratory as soon as possible.  Be sure to stop at the Registration Desk before dropping your specimen off in the Lab.  Note: If you are collecting urine for a Urosat (for stones), please try to bring the container to the Lab Monday-Friday.    "

## 2024-06-27 NOTE — PROGRESS NOTES
Endocrinology and Diabetes      Alma Michele is a 79 year old yo female who is being referred for: Osteoporosis     Medical History Pertinent to this consultation includes:    Interval History  Has done Pilates but has done this mostly in [place]. Has not done this regularly here in [place].     Walking is limited due to hip pain.  Does walk for day to day activities, but not doing long walks for exercise necessarily.     History of Problem  She was initially diagnosed in 2009 based on DXA available at [facility]     5/18/2009  Femur Neck: BMD =  0.712 g/cm (sq)   T-score = -2.4      Z-score = -0.7     Total Hip: BMD =  0.683 g/cm (sq)   T-score = -2.6      Z-score = -1.2     Right Hip [average of 2 scans]:   Femur Neck: BMD =  0.689 g/cm (sq)   T-score = -2.5      Z-score = -0.9   Total Hip: BMD =  0.695 g/cm (sq)   T-score = -2.5      Z-score = -1.1     Lumbar Spine [average of 2 scans]:   L1: BMD = 0.836 g/cm (sq), T-score =-2.5, Z-score =-0.6   L2: BMD = 0.906 g/cm (sq), T-score =-2.5, Z-score =-0.6   Total Lumbar Spine: BMD =  0.873 g/cm (sq)   T-score = -2.5      Z-score = -0.6      She does not recall having started medical therapy at this time, but was started on Alendronate around 2015 and sh continued to take until 2019. She stopped one year earlier than planned for her drug holiday. She restarted on 9/2021 after she completed 2 years of drug holiday. She has been taking Alendronate consistently and has not had any side effects. In 2023 she had a follow up DXA this year which showed 6 % loss of BMD which was a significant change for which she was referred for further evaluation.     Zoledronic Acid, Given 8/31/2023     She has not had any vertebral or hip fractures. There is no family history of osteoporotic fractures.      Calcium and Vitamin D. Not taking Calcium supplement currently. She consumes cheese, yogurt, leafy green vegetables. Vitamin D she takes 2,000 international unit(s) daily.     Prior  "evaluation for secondary causes in 2023 was negative for Celiac, Primary Hyperparathyroidism, Hyperthyroidism and Paraproteinemias.     Prior imaging studies    DXA 6/2024  INTERVAL CHANGE  -There has been a 2.4% increase in lumbar spine BMD.  -There has been a 1.8% increase in bilateral hip BMD.     No Known Allergies    Past Medical History:   Diagnosis Date    Essential hypertension     Osteoporosis        Past Surgical History:   Procedure Laterality Date    INJECT TRIGGER POINT Right 2/20/2023    Procedure: ultrasound-guided right piriformis muscle injection;  Surgeon: Gary Renee MD;  [place] [facility] [place]       Social History     Tobacco Use    Smoking status: Never     Passive exposure: Never    Smokeless tobacco: Never   Vaping Use    Vaping status: Never Used   Substance Use Topics    Alcohol use: Yes     Alcohol/week: 14.0 standard drinks of alcohol     Types: 14 Standard drinks or equivalent per week     Comment: 14 drinks/week    Drug use: No         Review of Systems   All other systems reviewed and are negative.       Objective    BP (!) 144/83   Pulse 67   Ht 1.626 m (5' 4\")   Wt 47.2 kg (104 lb)   LMP  (LMP Unknown)   SpO2 100%   BMI 17.85 kg/m       Wt Readings from Last 3 Encounters:   06/27/24 47.2 kg (104 lb)   12/01/23 46.3 kg (102 lb)   10/13/23 45.8 kg (101 lb)        Body mass index is 17.85 kg/m .    Physical Exam        Assessment.       ICD-10-CM    1. Age-related osteoporosis without current pathological fracture  M81.0 Basic metabolic panel     Vitamin D Deficiency (D3 Only)     Phosphorus     Calcium timed urine     Creatinine timed urine           Plan    Osteoporosis  She tolerated initial infusion of Zoledronic Acid with follow up DXA showing stable bone density. Plan to continue for another 2 years followed by a drug holiday.     Will plan for repeat DXA in 2 years.     Recommended to add back Calcium 600 mg daily, and reduced Vitamin D to 1,000 international units daily. "     Will check labs again in 8/2024 prior to infusion to include phosphorus and will check a 24 hour urine for calcium determination.              Denilson Gallardo MD  Endocrinology Fellow

## 2024-06-27 NOTE — ASSESSMENT & PLAN NOTE
She tolerated initial infusion of Zoledronic Acid with follow up DXA showing stable bone density. Plan to continue for another 2 years followed by a drug holiday.     Will plan for repeat DXA in 2 years.     Recommended to add back Calcium 600 mg daily, and reduced Vitamin D to 1,000 international units daily.     Will check labs again in 8/2024 prior to infusion to include phosphorus and will check a 24 hour urine for calcium determination.

## 2024-06-27 NOTE — LETTER
6/27/2024       RE: Alma Michele  [address] [place] [address]     Dear Colleague,    Thank you for referring your patient, Alma Michele, to the [facility] ENDOCRINOLOGY [facility]INIC [place] [facility]. Please see a copy of my visit note below.    06/18/24 12:07 PM  PATIENT LAB/IMAGING STATUS : MyChart sent to patient to complete standing/future orders   06/19/24 11:46 AM  PATIENT LAB/IMAGING STATUS : Orders completed / No further action needed   Appointment reminder phone call made to patient.         Endocrinology and Diabetes      Alma Michele is a 79 year old yo female who is being referred for: Osteoporosis     Medical History Pertinent to this consultation includes:    Interval History  Has done Pilates but has done this mostly in [place]. Has not done this regularly here in [place].     Walking is limited due to hip pain.  Does walk for day to day activities, but not doing long walks for exercise necessarily.     History of Problem  She was initially diagnosed in 2009 based on DXA available at [facility]     5/18/2009  Femur Neck: BMD =  0.712 g/cm (sq)   T-score = -2.4      Z-score = -0.7     Total Hip: BMD =  0.683 g/cm (sq)   T-score = -2.6      Z-score = -1.2     Right Hip [average of 2 scans]:   Femur Neck: BMD =  0.689 g/cm (sq)   T-score = -2.5      Z-score = -0.9   Total Hip: BMD =  0.695 g/cm (sq)   T-score = -2.5      Z-score = -1.1     Lumbar Spine [average of 2 scans]:   L1: BMD = 0.836 g/cm (sq), T-score =-2.5, Z-score =-0.6   L2: BMD = 0.906 g/cm (sq), T-score =-2.5, Z-score =-0.6   Total Lumbar Spine: BMD =  0.873 g/cm (sq)   T-score = -2.5      Z-score = -0.6      She does not recall having started medical therapy at this time, but was started on Alendronate around 2015 and sh continued to take until 2019. She stopped one year earlier than planned for her drug holiday. She restarted on 9/2021 after she completed 2 years of drug  "holiday. She has been taking Alendronate consistently and has not had any side effects. In 2023 she had a follow up DXA this year which showed 6 % loss of BMD which was a significant change for which she was referred for further evaluation.     Zoledronic Acid, Given 8/31/2023     She has not had any vertebral or hip fractures. There is no family history of osteoporotic fractures.      Calcium and Vitamin D. Not taking Calcium supplement currently. She consumes cheese, yogurt, leafy green vegetables. Vitamin D she takes 2,000 international unit(s) daily.     Prior evaluation for secondary causes in 2023 was negative for Celiac, Primary Hyperparathyroidism, Hyperthyroidism and Paraproteinemias.     Prior imaging studies    DXA 6/2024  INTERVAL CHANGE  -There has been a 2.4% increase in lumbar spine BMD.  -There has been a 1.8% increase in bilateral hip BMD.     No Known Allergies    Past Medical History:   Diagnosis Date    Essential hypertension     Osteoporosis        Past Surgical History:   Procedure Laterality Date    INJECT TRIGGER POINT Right 2/20/2023    Procedure: ultrasound-guided right piriformis muscle injection;  Surgeon: Gary Renee MD;  [place] [facility] [place]       Social History     Tobacco Use    Smoking status: Never     Passive exposure: Never    Smokeless tobacco: Never   Vaping Use    Vaping status: Never Used   Substance Use Topics    Alcohol use: Yes     Alcohol/week: 14.0 standard drinks of alcohol     Types: 14 Standard drinks or equivalent per week     Comment: 14 drinks/week    Drug use: No         Review of Systems   All other systems reviewed and are negative.       Objective    BP (!) 144/83   Pulse 67   Ht 1.626 m (5' 4\")   Wt 47.2 kg (104 lb)   LMP  (LMP Unknown)   SpO2 100%   BMI 17.85 kg/m       Wt Readings from Last 3 Encounters:   06/27/24 47.2 kg (104 lb)   12/01/23 46.3 kg (102 lb)   10/13/23 45.8 kg (101 lb)        Body mass index is 17.85 kg/m .    Physical " Exam        Assessment.       ICD-10-CM    1. Age-related osteoporosis without current pathological fracture  M81.0 Basic metabolic panel     Vitamin D Deficiency (D3 Only)     Phosphorus     Calcium timed urine     Creatinine timed urine           Plan    Osteoporosis  She tolerated initial infusion of Zoledronic Acid with follow up DXA showing stable bone density. Plan to continue for another 2 years followed by a drug holiday.     Will plan for repeat DXA in 2 years.     Recommended to add back Calcium 600 mg daily, and reduced Vitamin D to 1,000 international units daily.     Will check labs again in 8/2024 prior to infusion to include phosphorus and will check a 24 hour urine for calcium determination.              Denilson Gallardo MD  Endocrinology Fellow      Attestation signed by Harish Will MD at 6/29/2024  1:46 PM:  I, Harish Will, saw this patient with the resident/fellow and agree with the resident/fellow's findings and plan of care as documented in the resident/fellow's note.    I personally reviewed vital signs, medications, labs, imaging, and notes.    20 minutes spent on the date of the encounter doing chart review, history and exam, documentation and further activities as noted above.     The longitudinal plan of care for the diagnosis(es)/condition(s) as documented were addressed during this visit. Due to the added complexity in care, I will continue to support Alma in the subsequent management and with ongoing continuity of care.

## 2024-07-15 PROCEDURE — 82340 ASSAY OF CALCIUM IN URINE: CPT | Performed by: INTERNAL MEDICINE

## 2024-07-15 PROCEDURE — 99000 SPECIMEN HANDLING OFFICE-LAB: CPT | Performed by: PATHOLOGY

## 2024-07-15 PROCEDURE — 82570 ASSAY OF URINE CREATININE: CPT | Performed by: INTERNAL MEDICINE

## 2024-07-16 ENCOUNTER — PRE VISIT (OUTPATIENT)
Dept: OPHTHALMOLOGY | Facility: CLINIC | Age: 79
End: 2024-07-16

## 2024-07-16 ENCOUNTER — APPOINTMENT (OUTPATIENT)
Dept: LAB | Facility: CLINIC | Age: 79
End: 2024-07-16
Payer: MEDICARE

## 2024-07-16 LAB
CALCIUM 24H UR-MRATE: 0.13 G/SPEC (ref 0.1–0.3)
CALCIUM UR-MCNC: 9.6 MG/DL
COLLECT DURATION TIME UR: 24 H
COLLECT DURATION TIME UR: 24 H
CREAT 24H UR-MRATE: 0.81 G/SPEC (ref 0.72–1.51)
CREAT UR-MCNC: 59.8 MG/DL
SPECIMEN VOL UR: 1350 ML
SPECIMEN VOL UR: 1350 ML

## 2024-07-18 RX ORDER — EPINEPHRINE 1 MG/ML
0.3 INJECTION, SOLUTION, CONCENTRATE INTRAVENOUS EVERY 5 MIN PRN
Status: CANCELLED | OUTPATIENT
Start: 2024-07-25

## 2024-07-18 RX ORDER — ALBUTEROL SULFATE 0.83 MG/ML
2.5 SOLUTION RESPIRATORY (INHALATION)
Status: CANCELLED | OUTPATIENT
Start: 2024-07-25

## 2024-07-18 RX ORDER — DIPHENHYDRAMINE HYDROCHLORIDE 50 MG/ML
50 INJECTION INTRAMUSCULAR; INTRAVENOUS
Status: CANCELLED
Start: 2024-07-25

## 2024-07-18 RX ORDER — ZOLEDRONIC ACID 5 MG/100ML
5 INJECTION, SOLUTION INTRAVENOUS ONCE
Status: CANCELLED
Start: 2024-07-25

## 2024-07-18 RX ORDER — HEPARIN SODIUM,PORCINE 10 UNIT/ML
5-20 VIAL (ML) INTRAVENOUS DAILY PRN
Status: CANCELLED | OUTPATIENT
Start: 2024-07-25

## 2024-07-18 RX ORDER — METHYLPREDNISOLONE SODIUM SUCCINATE 125 MG/2ML
125 INJECTION, POWDER, LYOPHILIZED, FOR SOLUTION INTRAMUSCULAR; INTRAVENOUS
Status: CANCELLED
Start: 2024-07-25

## 2024-07-18 RX ORDER — HEPARIN SODIUM (PORCINE) LOCK FLUSH IV SOLN 100 UNIT/ML 100 UNIT/ML
5 SOLUTION INTRAVENOUS
Status: CANCELLED | OUTPATIENT
Start: 2024-07-25

## 2024-07-18 RX ORDER — ALBUTEROL SULFATE 90 UG/1
1-2 AEROSOL, METERED RESPIRATORY (INHALATION)
Status: CANCELLED
Start: 2024-07-25

## 2024-07-18 RX ORDER — ACETAMINOPHEN 325 MG/1
650 TABLET ORAL
Status: CANCELLED | OUTPATIENT
Start: 2024-07-25

## 2024-08-09 NOTE — RESULT ENCOUNTER NOTE
Mrs. Michele,     Your urinary calcium was not elevated. Continue on the calcium supplement as we discussed and the reduced Vitamin D as we had discussed in clinic.     Denilson Gallardo

## 2024-08-14 ENCOUNTER — OFFICE VISIT (OUTPATIENT)
Dept: INTERNAL MEDICINE | Facility: CLINIC | Age: 79
End: 2024-08-14
Payer: MEDICARE

## 2024-08-14 ENCOUNTER — LAB (OUTPATIENT)
Dept: LAB | Facility: CLINIC | Age: 79
End: 2024-08-14
Payer: MEDICARE

## 2024-08-14 VITALS
BODY MASS INDEX: 17.69 KG/M2 | DIASTOLIC BLOOD PRESSURE: 78 MMHG | WEIGHT: 103.6 LBS | HEART RATE: 62 BPM | SYSTOLIC BLOOD PRESSURE: 134 MMHG | TEMPERATURE: 98.3 F | HEIGHT: 64 IN | RESPIRATION RATE: 14 BRPM | OXYGEN SATURATION: 100 %

## 2024-08-14 DIAGNOSIS — M81.0 AGE-RELATED OSTEOPOROSIS WITHOUT CURRENT PATHOLOGICAL FRACTURE: ICD-10-CM

## 2024-08-14 DIAGNOSIS — G47.00 INSOMNIA, UNSPECIFIED TYPE: ICD-10-CM

## 2024-08-14 DIAGNOSIS — H04.213 EPIPHORA DUE TO EXCESS LACRIMATION OF BOTH SIDES: ICD-10-CM

## 2024-08-14 DIAGNOSIS — M48.062 SPINAL STENOSIS OF LUMBAR REGION WITH NEUROGENIC CLAUDICATION: ICD-10-CM

## 2024-08-14 DIAGNOSIS — Z01.818 PRE-OP EXAM: Primary | ICD-10-CM

## 2024-08-14 LAB
ANION GAP SERPL CALCULATED.3IONS-SCNC: 11 MMOL/L (ref 7–15)
ATRIAL RATE - MUSE: 58 BPM
BUN SERPL-MCNC: 12.1 MG/DL (ref 8–23)
CALCIUM SERPL-MCNC: 9 MG/DL (ref 8.8–10.4)
CHLORIDE SERPL-SCNC: 97 MMOL/L (ref 98–107)
CREAT SERPL-MCNC: 0.66 MG/DL (ref 0.51–0.95)
DIASTOLIC BLOOD PRESSURE - MUSE: NORMAL MMHG
EGFRCR SERPLBLD CKD-EPI 2021: 89 ML/MIN/1.73M2
GLUCOSE SERPL-MCNC: 91 MG/DL (ref 70–99)
HCO3 SERPL-SCNC: 25 MMOL/L (ref 22–29)
INTERPRETATION ECG - MUSE: NORMAL
P AXIS - MUSE: 71 DEGREES
PHOSPHATE SERPL-MCNC: 3.9 MG/DL (ref 2.5–4.5)
POTASSIUM SERPL-SCNC: 4.4 MMOL/L (ref 3.4–5.3)
PR INTERVAL - MUSE: 142 MS
QRS DURATION - MUSE: 70 MS
QT - MUSE: 428 MS
QTC - MUSE: 420 MS
R AXIS - MUSE: 1 DEGREES
SODIUM SERPL-SCNC: 133 MMOL/L (ref 135–145)
SYSTOLIC BLOOD PRESSURE - MUSE: NORMAL MMHG
T AXIS - MUSE: 42 DEGREES
VENTRICULAR RATE- MUSE: 58 BPM
VIT D+METAB SERPL-MCNC: 62 NG/ML (ref 20–50)

## 2024-08-14 PROCEDURE — 80048 BASIC METABOLIC PNL TOTAL CA: CPT

## 2024-08-14 PROCEDURE — 82306 VITAMIN D 25 HYDROXY: CPT

## 2024-08-14 PROCEDURE — 99214 OFFICE O/P EST MOD 30 MIN: CPT | Performed by: INTERNAL MEDICINE

## 2024-08-14 PROCEDURE — G2211 COMPLEX E/M VISIT ADD ON: HCPCS | Performed by: INTERNAL MEDICINE

## 2024-08-14 PROCEDURE — 93005 ELECTROCARDIOGRAM TRACING: CPT | Performed by: INTERNAL MEDICINE

## 2024-08-14 PROCEDURE — 84100 ASSAY OF PHOSPHORUS: CPT

## 2024-08-14 PROCEDURE — 93010 ELECTROCARDIOGRAM REPORT: CPT | Performed by: STUDENT IN AN ORGANIZED HEALTH CARE EDUCATION/TRAINING PROGRAM

## 2024-08-14 PROCEDURE — 36415 COLL VENOUS BLD VENIPUNCTURE: CPT

## 2024-08-14 RX ORDER — ZOLPIDEM TARTRATE 10 MG/1
5 TABLET ORAL
Qty: 45 TABLET | Refills: 0 | Status: SHIPPED | OUTPATIENT
Start: 2024-08-14

## 2024-08-14 RX ORDER — GABAPENTIN 300 MG/1
600 CAPSULE ORAL AT BEDTIME
Qty: 180 CAPSULE | Refills: 1 | Status: SHIPPED | OUTPATIENT
Start: 2024-08-14

## 2024-08-14 NOTE — PATIENT INSTRUCTIONS
Satisfactory preoperative medical examination in anticipation of facial/nasal surgery on her tear ducts  Because of a longstanding problem of bilateral epiphora, and had previous unsuccessful attempts to cannulate her tear ducts.    Surgeries to be done August 22, 2024 at [facility] surgery center.  Alma understands that this is to be done under general anesthesia.    She is feeling well these days.  Electrocardiogram done today August 14, 2024 shows sinus bradycardia 58 bpm, borderline call left atrial enlargement, but I regard this is a benign electrocardiogram.    She had lab work done this morning ordered by endocrinology Dr. Ochoa because of osteoporosis management.  That included a basic metabolic panel which is pending at the moment.  There is a CBC on file from October 11, 2023 where she runs just a slightly reduced hemoglobin level of 11.7 g.    Significant blood loss is NOT expected with this tear duct surgery.    Blood pressure looks good at 134/78.  Takes amlodipine 2.5 mg a day as monotherapy.    I reminded her to STOP IBUPROFEN for 1 week prior to the surgery.    With regard to change medications, I told her that the night before her surgery she can take her evening dose of gabapentin at bedtime per usual routine  Every and her evening dose of amlodipine for blood pressure.  Okay to take Ambien for sleep if needed    The morning of her surgery she will be n.p.o. (nothing by mouth, including no medications.    Recommendation  Approval given to proceed with proposed procedure, without further diagnostic evaluation.

## 2024-08-14 NOTE — PROGRESS NOTES
Preoperative Evaluation  [facility]  [address] [facility] [address]  Phone: [phone]  Fax: [phone]  Primary Provider: RAJ JUNG MD  Pre-op Performing Provider: RAJ JUNG MD  Aug 14, 2024         8/14/2024   Surgical Information   What procedure is being done? Dacryocystorhinostomy   Facility or Hospital where procedure/surgery will be performed: [facility] [place]   Who is doing the procedure / surgery? Dr. Felicia Bai   Date of surgery / procedure: August 22 2024   Time of surgery / procedure: ?   Where do you plan to recover after surgery? at home with family      Fax number for surgical facility: Note does not need to be faxed, will be available electronically in Epic.    Assessment & Plan     The proposed surgical procedure is considered LOW risk.    Satisfactory preoperative medical examination in anticipation of facial/nasal surgery on her tear ducts  Because of a longstanding problem of bilateral epiphora, and had previous unsuccessful attempts to cannulate her tear ducts.    Surgeries to be done August 22, 2024 at [facility] [place].  Alma understands that this is to be done under general anesthesia.    She is feeling well these days.  Electrocardiogram done today August 14, 2024 shows sinus bradycardia 58 bpm, borderline call left atrial enlargement.  There is a small Q wave in precordial lead V2, which could be septal infarct pattern.  I regard this is a benign electrocardiogram.    She had lab work done this morning ordered by endocrinology Dr. Gallardo because of osteoporosis management.  That included a basic metabolic panel which is pending at the moment.  There is a CBC on file from October 11, 2023 where she runs just a slightly reduced hemoglobin level of 11.7 g.  LATER: Slightly hyponatremic sodium 133, but similar to previous readings.  BMP otherwise okay.  Normal phosphorus level.   Slightly elevated vitamin D concentration.    Significant blood loss is NOT expected with this tear duct surgery.    Blood pressure looks good at 134/78.  Takes amlodipine 2.5 mg a day as monotherapy.    I reminded her to STOP IBUPROFEN for 1 week prior to the surgery.    With regard to change medications, I told her that the night before her surgery she can take her evening dose of gabapentin at bedtime per usual routine  Every and her evening dose of amlodipine for blood pressure.  Okay to take Ambien for sleep if needed    The morning of her surgery she will be n.p.o. (nothing by mouth, including no medications.    Recommendation  Approval given to proceed with proposed procedure, without further diagnostic evaluation.    Subjective   Alma is a 79 year old, presenting for the following:  Pre-Op Exam        8/14/2024    11:14 AM   Additional Questions   Roomed by      Bilateral epiphora (excessive tears), takes doxycycline low-dose 50 mg once a day, monitored at [place] [facility], Status post bilateral intraocular implants.      Essential hypertension, for which Alma resumed the small dose of amlodipine 2.5 mg a day November 2023     I had asked Alma to stop the amlodipine after our previous meeting of October 13, because she had experienced a syncopal spell that led her to the [facility] October 11, and was hypotensive. But when she tried stopping the amlodipine, her blood pressures went up to 150 to even 170 systolic according to her home machine.     She did bring her home blood pressure machine to clinic December 1, 2023 and it compares fairly close to a manual reading      25-day cardiac event monitor showing no signs of rhythm disturbance  Cardiac event monitoring from 10/19/2023 to 11/17/2023 (monitored duration 25d 20h).      9-  Sinus rhythm   Low voltage QRS   Borderline ECG   No previous ECGs available   Confirmed by JEANNA FROST MD LOC:MILA ([id number]) on 9/26/2023 4:09:59 PM       Underwent injection of platelet rich plasma into the right gluteal tendon, for diagnosis of gluteal tendinopathy  Done at [facility] on October 10, 2023  She is getting around with crutches, and was told that it may take several weeks to notice symptomatic benefit.     Underweight, body mass index around 18     Wt Readings from Last 5 Encounters:   08/14/24 47 kg (103 lb 9.6 oz)   06/27/24 47.2 kg (104 lb)   12/01/23 46.3 kg (102 lb)   10/13/23 45.8 kg (101 lb)   09/26/23 45.8 kg (101 lb)     Bilateral earwax, despite her using over-the-counter peroxide eardrops  ENT for manual clear out, and once that is done, I want her to use wax dissolving eardrops every week, maybe even twice a week to keep the ears clear.     Successful retina surgery December 6, 2022 for macular pucker  She will be departing for the [facility] after New Year's Day     Spinal canal stenosis  Right L5 transforaminal epidural steroid injection July 14, 2022  Lumbar spine MRI June 16, 2022  Moderate lumbar spondylosis with broad-based disc herniations evident throughout the lumbar spine.  Disc bulging and facet degenerative changes resulting in severe spinal canal stenosis and lateral recess narrowing at L4-5 and moderate to severe spinal canal stenosis at L3-4.     Takes gabapentin 600 mg at bedtime (2 capsules of 300 mg each)     Right side piriformis syndrome.  After she had consultation with Dr. Sage Pickett at the , she was given a diagnosis of piriformis syndrome, and got an injection done at the  in January 2023 which was helpful  And since then has been doing physical therapy twice a week     But still bothered by pain with ambulation, so she is going to meet with [facility] to consider Botox injection to the chronically contracted muscles     Underwent sacroiliac joint injection on the right side October 14, 2022     Osteoporosis for which she has been on alendronate for approximately 4 years,  stopping in 2019, restarted June 2021  Switch to Reclast infusion (zoledronic acid) administered August 31, 2023, which plan to give that for 2 to 3 years, then drug holiday.  Under supervision of Dr. Gallardo (endocrinology)     [facility] endocrinology had recommended her to continue alendronate through 2020 to complete 5 years.  However she decided to stop it in 2019.  DEXA scan was May 2019 done at [facility] with a femoral neck T score of -2.5, lumbar spine T score -2.2.     Fariba 3 2021  1. The spine bone density L1-L2 with T-score -2.4.  2. Femoral bone densities show left total hip T- score -2.9 and right total hip T-score -2.7.  3. Trabecular bone score indicates poor trabecular bone architecture.     I reminded her about the importance of staying on her calcium and vitamin D supplements. She is no history of fractures.  She stays physically active.  Her gait is stable.     Gravelly voice  She has no pain in her throat or neck, and I do not palpate any abnormalities.  I told her 1 possibilities of vocal cord polyps, although she does not strain her vocal cords.  She has never had a smoking habit.  I told her that if she wants to pursue evaluation, that would be an otolaryngology consultation, which would include fiberoptic laryngoscopy.  She will let me know if she wants to proceed with that.      Earwax, I suggested to use an over-the-counter wax dissolving eardrop kit, example brand Debrox or Murine     Mild alopecia and spider veins      Insomnia for which he uses every day Ambien, but I really want her to cease alcohol consumption since she uses Ambien.  She told me that alcohol consumption is 1 glass of wine per day.      Recurrent herpes simplex labialis (cold sores) takes Valtrex 500 mg tablet intermittently.       She recall satisfactory screening mammogram done either March or April 2022 near [facility]     Colon polyp, 5 mm in the transverse seen on colonoscopy November 23, 2020.   Recheck in 5 years would be entirely optional.  She told me that the prep was really unpleasant, to the point that it made her vomit.  I told her that I think she could probably stop doing screening colonoscopy, especially since she also had a negative Cologuard on November 6, 2020 8/14/2024   Pre-Op Questionnaire   Have you ever had a heart attack or stroke? No   Have you ever had surgery on your heart or blood vessels, such as a stent placement, a coronary artery bypass, or surgery on an artery in your head, neck, heart, or legs? No   Do you have chest pain with activity? No   Do you have a history of heart failure? No   Do you currently have a cold, bronchitis or symptoms of other infection? No   Do you have a cough, shortness of breath, or wheezing? No   Do you or anyone in your family have previous history of blood clots? No   Do you or does anyone in your family have a serious bleeding problem such as prolonged bleeding following surgeries or cuts? No   Have you ever had problems with anemia or been told to take iron pills? No   Have you had any abnormal blood loss such as black, tarry or bloody stools, or abnormal vaginal bleeding? No   Have you ever had a blood transfusion? No   Are you willing to have a blood transfusion if it is medically needed before, during, or after your surgery? Yes   Have you or any of your relatives ever had problems with anesthesia? No   Do you have sleep apnea, excessive snoring or daytime drowsiness? No   Do you have any artifical heart valves or other implanted medical devices like a pacemaker, defibrillator, or continuous glucose monitor? No   Do you have artificial joints? No   Are you allergic to latex? No        Patient Active Problem List    Diagnosis Date Noted    Dermatochalasis of both upper eyelids 06/11/2024     Priority: Medium    Ptosis of both eyelids 06/11/2024     Priority: Medium    Piriformis muscle pain 02/08/2023     Priority: Medium     Added  automatically from request for surgery [id number]      Muscle spasm 02/08/2023     Priority: Medium     Added automatically from request for surgery [id number]      Hyponatremia 05/26/2021     Priority: Medium    Personal history of colonic polyps 05/26/2021     Priority: Medium    Underweight 12/17/2020     Priority: Medium    Bilateral epiphora 08/28/2020     Priority: Medium    Insomnia 08/28/2020     Priority: Medium    Recurrent cold sores 05/12/2017     Priority: Medium    Osteoporosis 05/12/2017     Priority: Medium    Actinic keratosis 05/12/2017     Priority: Medium    H/O bilateral breast implants 05/12/2017     Priority: Medium      Past Medical History:   Diagnosis Date    Essential hypertension     Osteoporosis      Past Surgical History:   Procedure Laterality Date    INJECT TRIGGER POINT Right 2/20/2023    Procedure: ultrasound-guided right piriformis muscle injection;  Surgeon: Gary Renee MD;  Location: [facility] OR     Current Outpatient Medications   Medication Sig Dispense Refill    amLODIPine (NORVASC) 2.5 MG tablet TAKE 1 TABLET BY MOUTH 2 TIMES DAILY 180 tablet 2    cholecalciferol, vitamin D3, (VITAMIN D3) 2,000 unit cap [CHOLECALCIFEROL, VITAMIN D3, (VITAMIN D3) 2,000 UNIT CAP] Take 1 capsule by mouth daily.      gabapentin (NEURONTIN) 300 MG capsule TAKE 2 CAPSULES BY MOUTH AT BEDTIME. 180 capsule 1    ibuprofen (ADVIL,MOTRIN) 200 MG tablet [IBUPROFEN (ADVIL,MOTRIN) 200 MG TABLET] Take 400 mg by mouth every 6 (six) hours as needed for pain.      ELIZABETH 128 5 % ophthalmic ointment [ELIZABETH 128 5 % OPHTHALMIC OINTMENT] CORRIE 0.25 INCH TO BOTH EYES QPM      valACYclovir (VALTREX) 500 MG tablet Take 1 tablet (500 mg) by mouth 2 times daily as needed (In case of cold sore recurrence, take for 3 days, start as soon as symptoms appear) 20 tablet 1    vitamin B-Complex Take 1 tablet by mouth daily      zolpidem (AMBIEN) 10 MG tablet TAKE 0.5 TABLETS (5 MG) BY MOUTH NIGHTLY AS NEEDED 45 tablet 0     No Known  "Allergies     Social History     Tobacco Use    Smoking status: Never     Passive exposure: Never    Smokeless tobacco: Never   Substance Use Topics    Alcohol use: Yes     Alcohol/week: 14.0 standard drinks of alcohol     Types: 14 Standard drinks or equivalent per week     Comment: 14 drinks/week     History   Drug Use No     Review of Systems  Constitutional, HEENT, cardiovascular, pulmonary, gi and gu systems are negative, except as otherwise noted.    Objective    /78 (BP Location: Right arm, Patient Position: Sitting, Cuff Size: Adult Regular)   Pulse 62   Temp 98.3  F (36.8  C) (Oral)   Resp 14   Ht 1.626 m (5' 4\")   Wt 47 kg (103 lb 9.6 oz)   LMP  (LMP Unknown)   SpO2 100%   Breastfeeding No   BMI 17.78 kg/m     Estimated body mass index is 17.78 kg/m  as calculated from the following:    Height as of this encounter: 1.626 m (5' 4\").    Weight as of this encounter: 47 kg (103 lb 9.6 oz).  Physical Exam    General: Alert, in no distress  Skin: No significant lesion seen.  Eyes/nose/throat: Eyes without scleral icterus, eye movements normal, pupils equal and reactive, oropharynx clear  MSK: Neck with good ROM  Lymphatic: Neck without adenopathy or masses  Endocrine: Thyroid with no nodules to palpation  Pulm: Lungs clear to auscultation bilaterally  Cardiac: Heart with regular rate and rhythm, no murmur or gallop  GI: Abdomen soft, nontender. No palpable enlargement of liver or spleen  MSK: Extremities no tenderness or edema  Neuro: Moves all extremities, without focal weakness  Psych: Alert, normal mental status. Normal affect and speech    Recent Labs   Lab Test 09/26/23  1211   *   POTASSIUM 4.5   CR 0.60      Diagnostics  Recent Results (from the past 48 hour(s))   Basic metabolic panel    Collection Time: 08/14/24 10:28 AM   Result Value Ref Range    Sodium 133 (L) 135 - 145 mmol/L    Potassium 4.4 3.4 - 5.3 mmol/L    Chloride 97 (L) 98 - 107 mmol/L    Carbon Dioxide (CO2) 25 22 - 29 " mmol/L    Anion Gap 11 7 - 15 mmol/L    Urea Nitrogen 12.1 8.0 - 23.0 mg/dL    Creatinine 0.66 0.51 - 0.95 mg/dL    GFR Estimate 89 >60 mL/min/1.73m2    Calcium 9.0 8.8 - 10.4 mg/dL    Glucose 91 70 - 99 mg/dL   Vitamin D Deficiency (D3 Only)    Collection Time: 08/14/24 10:28 AM   Result Value Ref Range    Vitamin D, Total (25-Hydroxy) 62 (H) 20 - 50 ng/mL   Phosphorus    Collection Time: 08/14/24 10:28 AM   Result Value Ref Range    Phosphorus 3.9 2.5 - 4.5 mg/dL   EKG 12-lead, tracing only    Collection Time: 08/14/24 11:30 AM   Result Value Ref Range    Systolic Blood Pressure  mmHg    Diastolic Blood Pressure  mmHg    Ventricular Rate 58 BPM    Atrial Rate 58 BPM    ID Interval 142 ms    QRS Duration 70 ms     ms    QTc 420 ms    P Axis 71 degrees    R AXIS 1 degrees    T Axis 42 degrees    Interpretation ECG       Sinus bradycardia  Possible Left atrial enlargement  Low voltage QRS  Septal infarct , age undetermined  Abnormal ECG  When compared with ECG of 26-Sep-2023 10:56,  Septal infarct is now Present  Confirmed by CHARITO HOBBS MD LOC:JN ([id number]) on 8/14/2024 12:12:36 PM          Revised Cardiac Risk Index (RCRI)  The patient has the following serious cardiovascular risks for perioperative complications:   - No serious cardiac risks = 0 points     RCRI Interpretation: 0 points: Class I (very low risk - 0.4% complication rate)    I spent 30 minutes on this encounter, including reviewing interval history since last visit, examining the patient, explaining and counseling the issues enumerated in the Assessment and Plan (patient given a copy), ordering indicated tests    The longitudinal plan of care for the diagnosis(es)/condition(s) as documented were addressed during this visit. Due to the added complexity in care, I will continue to support Alma in the subsequent management and with ongoing continuity of care.    Signed Electronically by: RAJ JUNG MD  A copy of this evaluation report is  provided to the requesting physician.

## 2024-08-21 ENCOUNTER — ANESTHESIA EVENT (OUTPATIENT)
Dept: SURGERY | Facility: AMBULATORY SURGERY CENTER | Age: 79
End: 2024-08-21
Payer: MEDICARE

## 2024-08-22 ENCOUNTER — HOSPITAL ENCOUNTER (OUTPATIENT)
Facility: AMBULATORY SURGERY CENTER | Age: 79
Discharge: HOME | End: 2024-08-22
Attending: OPHTHALMOLOGY
Payer: MEDICARE

## 2024-08-22 ENCOUNTER — ANESTHESIA (OUTPATIENT)
Dept: SURGERY | Facility: AMBULATORY SURGERY CENTER | Age: 79
End: 2024-08-22
Payer: MEDICARE

## 2024-08-22 VITALS
DIASTOLIC BLOOD PRESSURE: 77 MMHG | HEART RATE: 76 BPM | WEIGHT: 103 LBS | RESPIRATION RATE: 16 BRPM | TEMPERATURE: 97 F | OXYGEN SATURATION: 97 % | BODY MASS INDEX: 17.58 KG/M2 | SYSTOLIC BLOOD PRESSURE: 145 MMHG | HEIGHT: 64 IN

## 2024-08-22 DIAGNOSIS — Z98.890 POSTOPERATIVE EYE STATE: ICD-10-CM

## 2024-08-22 DIAGNOSIS — H04.551 NLDO, ACQUIRED (NASOLACRIMAL DUCT OBSTRUCTION), RIGHT: Primary | ICD-10-CM

## 2024-08-22 PROCEDURE — 68720 CREATE TEAR SAC DRAIN: CPT | Performed by: NURSE ANESTHETIST, CERTIFIED REGISTERED

## 2024-08-22 PROCEDURE — 99100 ANES PT EXTEME AGE<1 YR&>70: CPT | Performed by: NURSE ANESTHETIST, CERTIFIED REGISTERED

## 2024-08-22 PROCEDURE — 68815 PROBE NASOLACRIMAL DUCT: CPT | Mod: RT | Performed by: OPHTHALMOLOGY

## 2024-08-22 PROCEDURE — 68815 PROBE NASOLACRIMAL DUCT: CPT | Mod: RT

## 2024-08-22 PROCEDURE — 88305 TISSUE EXAM BY PATHOLOGIST: CPT | Mod: TC | Performed by: OPHTHALMOLOGY

## 2024-08-22 PROCEDURE — 68720 CREATE TEAR SAC DRAIN: CPT | Mod: RT

## 2024-08-22 PROCEDURE — 68720 CREATE TEAR SAC DRAIN: CPT | Performed by: ANESTHESIOLOGY

## 2024-08-22 PROCEDURE — 88305 TISSUE EXAM BY PATHOLOGIST: CPT | Mod: 26 | Performed by: OPHTHALMOLOGY

## 2024-08-22 PROCEDURE — 68720 CREATE TEAR SAC DRAIN: CPT | Mod: RT | Performed by: OPHTHALMOLOGY

## 2024-08-22 PROCEDURE — 99100 ANES PT EXTEME AGE<1 YR&>70: CPT | Performed by: ANESTHESIOLOGY

## 2024-08-22 DEVICE — IMPLANTABLE DEVICE: Type: IMPLANTABLE DEVICE | Site: EYE | Status: FUNCTIONAL

## 2024-08-22 RX ORDER — CEFAZOLIN SODIUM 2 G/50ML
2 SOLUTION INTRAVENOUS SEE ADMIN INSTRUCTIONS
Status: DISCONTINUED | OUTPATIENT
Start: 2024-08-22 | End: 2024-08-23 | Stop reason: HOSPADM

## 2024-08-22 RX ORDER — FENTANYL CITRATE 50 UG/ML
INJECTION, SOLUTION INTRAMUSCULAR; INTRAVENOUS PRN
Status: DISCONTINUED | OUTPATIENT
Start: 2024-08-22 | End: 2024-08-22

## 2024-08-22 RX ORDER — LIDOCAINE HYDROCHLORIDE 20 MG/ML
INJECTION, SOLUTION INFILTRATION; PERINEURAL PRN
Status: DISCONTINUED | OUTPATIENT
Start: 2024-08-22 | End: 2024-08-22

## 2024-08-22 RX ORDER — FENTANYL CITRATE 50 UG/ML
25 INJECTION, SOLUTION INTRAMUSCULAR; INTRAVENOUS
Status: DISCONTINUED | OUTPATIENT
Start: 2024-08-22 | End: 2024-08-23 | Stop reason: HOSPADM

## 2024-08-22 RX ORDER — HYDROMORPHONE HYDROCHLORIDE 1 MG/ML
0.2 INJECTION, SOLUTION INTRAMUSCULAR; INTRAVENOUS; SUBCUTANEOUS EVERY 5 MIN PRN
Status: DISCONTINUED | OUTPATIENT
Start: 2024-08-22 | End: 2024-08-23 | Stop reason: HOSPADM

## 2024-08-22 RX ORDER — MEPERIDINE HYDROCHLORIDE 25 MG/ML
12.5 INJECTION INTRAMUSCULAR; INTRAVENOUS; SUBCUTANEOUS EVERY 5 MIN PRN
Status: DISCONTINUED | OUTPATIENT
Start: 2024-08-22 | End: 2024-08-23 | Stop reason: HOSPADM

## 2024-08-22 RX ORDER — HYDROMORPHONE HYDROCHLORIDE 1 MG/ML
0.4 INJECTION, SOLUTION INTRAMUSCULAR; INTRAVENOUS; SUBCUTANEOUS EVERY 5 MIN PRN
Status: DISCONTINUED | OUTPATIENT
Start: 2024-08-22 | End: 2024-08-23 | Stop reason: HOSPADM

## 2024-08-22 RX ORDER — LIDOCAINE 40 MG/G
CREAM TOPICAL
Status: DISCONTINUED | OUTPATIENT
Start: 2024-08-22 | End: 2024-08-23 | Stop reason: HOSPADM

## 2024-08-22 RX ORDER — ONDANSETRON 2 MG/ML
INJECTION INTRAMUSCULAR; INTRAVENOUS PRN
Status: DISCONTINUED | OUTPATIENT
Start: 2024-08-22 | End: 2024-08-22

## 2024-08-22 RX ORDER — OXYMETAZOLINE HYDROCHLORIDE 0.05 G/100ML
2 SPRAY NASAL 2 TIMES DAILY
Status: DISCONTINUED | OUTPATIENT
Start: 2024-08-22 | End: 2024-08-23 | Stop reason: HOSPADM

## 2024-08-22 RX ORDER — ERYTHROMYCIN 5 MG/G
OINTMENT OPHTHALMIC
Qty: 3.5 G | Refills: 1 | Status: SHIPPED | OUTPATIENT
Start: 2024-08-22 | End: 2024-09-10

## 2024-08-22 RX ORDER — ONDANSETRON 4 MG/1
4 TABLET, ORALLY DISINTEGRATING ORAL EVERY 30 MIN PRN
Status: DISCONTINUED | OUTPATIENT
Start: 2024-08-22 | End: 2024-08-23 | Stop reason: HOSPADM

## 2024-08-22 RX ORDER — OXYCODONE HYDROCHLORIDE 5 MG/1
5 TABLET ORAL EVERY 6 HOURS PRN
Qty: 12 TABLET | Refills: 0 | Status: SHIPPED | OUTPATIENT
Start: 2024-08-22 | End: 2024-08-25

## 2024-08-22 RX ORDER — OXYCODONE HYDROCHLORIDE 5 MG/1
10 TABLET ORAL
Status: DISCONTINUED | OUTPATIENT
Start: 2024-08-22 | End: 2024-08-23 | Stop reason: HOSPADM

## 2024-08-22 RX ORDER — PROPOFOL 10 MG/ML
INJECTION, EMULSION INTRAVENOUS PRN
Status: DISCONTINUED | OUTPATIENT
Start: 2024-08-22 | End: 2024-08-22

## 2024-08-22 RX ORDER — DEXAMETHASONE SODIUM PHOSPHATE 4 MG/ML
INJECTION, SOLUTION INTRA-ARTICULAR; INTRALESIONAL; INTRAMUSCULAR; INTRAVENOUS; SOFT TISSUE PRN
Status: DISCONTINUED | OUTPATIENT
Start: 2024-08-22 | End: 2024-08-22

## 2024-08-22 RX ORDER — CEPHALEXIN 500 MG/1
500 CAPSULE ORAL 2 TIMES DAILY
Qty: 20 CAPSULE | Refills: 0 | Status: SHIPPED | OUTPATIENT
Start: 2024-08-22 | End: 2024-09-01

## 2024-08-22 RX ORDER — SODIUM CHLORIDE, SODIUM LACTATE, POTASSIUM CHLORIDE, CALCIUM CHLORIDE 600; 310; 30; 20 MG/100ML; MG/100ML; MG/100ML; MG/100ML
INJECTION, SOLUTION INTRAVENOUS CONTINUOUS
Status: DISCONTINUED | OUTPATIENT
Start: 2024-08-22 | End: 2024-08-23 | Stop reason: HOSPADM

## 2024-08-22 RX ORDER — DEXAMETHASONE SODIUM PHOSPHATE 10 MG/ML
4 INJECTION, SOLUTION INTRAMUSCULAR; INTRAVENOUS
Status: DISCONTINUED | OUTPATIENT
Start: 2024-08-22 | End: 2024-08-23 | Stop reason: HOSPADM

## 2024-08-22 RX ORDER — OXYMETAZOLINE HYDROCHLORIDE 0.05 G/100ML
SPRAY NASAL PRN
Status: DISCONTINUED | OUTPATIENT
Start: 2024-08-22 | End: 2024-08-22 | Stop reason: HOSPADM

## 2024-08-22 RX ORDER — ACETAMINOPHEN 325 MG/1
975 TABLET ORAL ONCE
Status: COMPLETED | OUTPATIENT
Start: 2024-08-22 | End: 2024-08-22

## 2024-08-22 RX ORDER — NALOXONE HYDROCHLORIDE 0.4 MG/ML
0.1 INJECTION, SOLUTION INTRAMUSCULAR; INTRAVENOUS; SUBCUTANEOUS
Status: DISCONTINUED | OUTPATIENT
Start: 2024-08-22 | End: 2024-08-23 | Stop reason: HOSPADM

## 2024-08-22 RX ORDER — PROPOFOL 10 MG/ML
INJECTION, EMULSION INTRAVENOUS CONTINUOUS PRN
Status: DISCONTINUED | OUTPATIENT
Start: 2024-08-22 | End: 2024-08-22

## 2024-08-22 RX ORDER — ERYTHROMYCIN 5 MG/G
OINTMENT OPHTHALMIC PRN
Status: DISCONTINUED | OUTPATIENT
Start: 2024-08-22 | End: 2024-08-22 | Stop reason: HOSPADM

## 2024-08-22 RX ORDER — CEFAZOLIN SODIUM 2 G/50ML
2 SOLUTION INTRAVENOUS
Status: DISCONTINUED | OUTPATIENT
Start: 2024-08-22 | End: 2024-08-23 | Stop reason: HOSPADM

## 2024-08-22 RX ORDER — TRANEXAMIC ACID 10 MG/ML
1 INJECTION, SOLUTION INTRAVENOUS ONCE
Status: DISCONTINUED | OUTPATIENT
Start: 2024-08-22 | End: 2024-08-23 | Stop reason: HOSPADM

## 2024-08-22 RX ORDER — LABETALOL HYDROCHLORIDE 5 MG/ML
10 INJECTION, SOLUTION INTRAVENOUS
Status: DISCONTINUED | OUTPATIENT
Start: 2024-08-22 | End: 2024-08-23 | Stop reason: HOSPADM

## 2024-08-22 RX ORDER — HYDROXYZINE HYDROCHLORIDE 10 MG/1
10 TABLET, FILM COATED ORAL EVERY 6 HOURS PRN
Status: DISCONTINUED | OUTPATIENT
Start: 2024-08-22 | End: 2024-08-23 | Stop reason: HOSPADM

## 2024-08-22 RX ORDER — FENTANYL CITRATE 50 UG/ML
25 INJECTION, SOLUTION INTRAMUSCULAR; INTRAVENOUS EVERY 5 MIN PRN
Status: DISCONTINUED | OUTPATIENT
Start: 2024-08-22 | End: 2024-08-23 | Stop reason: HOSPADM

## 2024-08-22 RX ORDER — FENTANYL CITRATE 50 UG/ML
50 INJECTION, SOLUTION INTRAMUSCULAR; INTRAVENOUS EVERY 5 MIN PRN
Status: DISCONTINUED | OUTPATIENT
Start: 2024-08-22 | End: 2024-08-23 | Stop reason: HOSPADM

## 2024-08-22 RX ORDER — NEOMYCIN POLYMYXIN B SULFATES AND DEXAMETHASONE 3.5; 10000; 1 MG/ML; [USP'U]/ML; MG/ML
SUSPENSION/ DROPS OPHTHALMIC
Qty: 5 ML | Refills: 0 | Status: SHIPPED | OUTPATIENT
Start: 2024-08-22 | End: 2024-09-10

## 2024-08-22 RX ORDER — ONDANSETRON 2 MG/ML
4 INJECTION INTRAMUSCULAR; INTRAVENOUS EVERY 30 MIN PRN
Status: DISCONTINUED | OUTPATIENT
Start: 2024-08-22 | End: 2024-08-23 | Stop reason: HOSPADM

## 2024-08-22 RX ORDER — EPHEDRINE SULFATE 50 MG/ML
INJECTION, SOLUTION INTRAMUSCULAR; INTRAVENOUS; SUBCUTANEOUS PRN
Status: DISCONTINUED | OUTPATIENT
Start: 2024-08-22 | End: 2024-08-22

## 2024-08-22 RX ORDER — OXYCODONE HYDROCHLORIDE 5 MG/1
5 TABLET ORAL
Status: COMPLETED | OUTPATIENT
Start: 2024-08-22 | End: 2024-08-22

## 2024-08-22 RX ORDER — FLUTICASONE PROPIONATE 50 MCG
1 SPRAY, SUSPENSION (ML) NASAL DAILY
Qty: 9.9 ML | Refills: 0 | Status: SHIPPED | OUTPATIENT
Start: 2024-08-22 | End: 2024-09-10

## 2024-08-22 RX ADMIN — FENTANYL CITRATE 25 MCG: 50 INJECTION, SOLUTION INTRAMUSCULAR; INTRAVENOUS at 10:43

## 2024-08-22 RX ADMIN — PROPOFOL 150 MG: 10 INJECTION, EMULSION INTRAVENOUS at 09:21

## 2024-08-22 RX ADMIN — PROPOFOL 200 MCG/KG/MIN: 10 INJECTION, EMULSION INTRAVENOUS at 09:21

## 2024-08-22 RX ADMIN — ONDANSETRON 4 MG: 2 INJECTION INTRAMUSCULAR; INTRAVENOUS at 09:44

## 2024-08-22 RX ADMIN — EPHEDRINE SULFATE 10 MG: 50 INJECTION, SOLUTION INTRAMUSCULAR; INTRAVENOUS; SUBCUTANEOUS at 09:26

## 2024-08-22 RX ADMIN — SODIUM CHLORIDE, SODIUM LACTATE, POTASSIUM CHLORIDE, CALCIUM CHLORIDE: 600; 310; 30; 20 INJECTION, SOLUTION INTRAVENOUS at 08:36

## 2024-08-22 RX ADMIN — LIDOCAINE HYDROCHLORIDE 100 MG: 20 INJECTION, SOLUTION INFILTRATION; PERINEURAL at 09:21

## 2024-08-22 RX ADMIN — CEFAZOLIN SODIUM 2 G: 2 SOLUTION INTRAVENOUS at 09:14

## 2024-08-22 RX ADMIN — ACETAMINOPHEN 975 MG: 325 TABLET ORAL at 08:31

## 2024-08-22 RX ADMIN — EPHEDRINE SULFATE 10 MG: 50 INJECTION, SOLUTION INTRAMUSCULAR; INTRAVENOUS; SUBCUTANEOUS at 09:42

## 2024-08-22 RX ADMIN — FENTANYL CITRATE 25 MCG: 50 INJECTION, SOLUTION INTRAMUSCULAR; INTRAVENOUS at 09:39

## 2024-08-22 RX ADMIN — OXYCODONE HYDROCHLORIDE 5 MG: 5 TABLET ORAL at 10:43

## 2024-08-22 RX ADMIN — DEXAMETHASONE SODIUM PHOSPHATE 4 MG: 4 INJECTION, SOLUTION INTRA-ARTICULAR; INTRALESIONAL; INTRAMUSCULAR; INTRAVENOUS; SOFT TISSUE at 09:44

## 2024-08-22 RX ADMIN — FENTANYL CITRATE 25 MCG: 50 INJECTION, SOLUTION INTRAMUSCULAR; INTRAVENOUS at 09:19

## 2024-08-22 RX ADMIN — EPHEDRINE SULFATE 5 MG: 50 INJECTION, SOLUTION INTRAMUSCULAR; INTRAVENOUS; SUBCUTANEOUS at 09:30

## 2024-08-22 RX ADMIN — OXYMETAZOLINE HYDROCHLORIDE 2 SPRAY: 0.05 SPRAY NASAL at 09:13

## 2024-08-22 NOTE — OP NOTE
PREOPERATIVE DIAGNOSIS: Right complete nasolacrimal duct obstruction.   POSTOPERATIVE DIAGNOSIS: Right complete nasolacrimal duct obstruction.   PROCEDURE: Right dacryocystorhinostomy with silicone intubation. Lacrimal sac biopsy right side.   SURGEON: Felicia Bai M.D.  ASSISTANTS: Leonarda Valera MD and Akshat Lala MD  ANESTHESIA: General anesthesia with local infiltration of a 50/50 mixture of 2% lidocaine with epinephrine and 0.5% Marcaine.   COMPLICATIONS: None.   ESTIMATED BLOOD LOSS: Less than 5 mL.   SPECIMEN:   ID Type Source Tests Collected by Time Destination   1 : Right lacrimal sack Tissue Eye, Right SURGICAL PATHOLOGY EXAM Felicia Bai MD 8/22/2024  9:51 AM      HISTORY: Alma Michele  presented with complete nasolacrimal duct obstruction leading to tearing and chronic irritation. She has significant mucopurulent discharge from the right eye. After the risks, benefits and alternatives to the proposed procedure were explained, informed consent was obtained.   DESCRIPTION OF PROCEDURE: Alma Michele  was brought to the operating room and placed supine on the operating table. Under general anesthesia, a medial canthal incision was drawn 10 mm from the medial canthal angle and extending 8 mm in an inferotemporal direction.  The medial canthus and lateral nasal wall were infiltrated with local anesthetic mixture. Afrin soaked neuropaddies were placed into the nose in the region of the middle meatus.   The area  was prepped and draped in the typical fashion. Attention was directed to the right side. Medial canthal incision was made with a 15 blade and dissection was carried down through the orbicularis with monopolar cautery. [place] elevator was used to elevate the periorbita from the lacrimal fossa.  The neuropaddies were removed from the nose.  The thin posterior lacrimal bone was infractured with the [place] elevator. The bone was already quite eroded and minimal bone removal was necessary.  The osteotomy was created using Kerrison and Leksell rongeurs. Approximately a 12 mm osteotomy was created. The 0 Hooper probe was placed in the lacrimal sac through the upper canaliculus. The lacrimal sac was opened vertically with the keratome. A posterior flap of lacrimal sac was removed and sent to pathology. The nasal mucosa was incised with a keratome in a U-shaped anteriorly based flap. The stent tube silicone intubation set was used to intubate the upper and lower canalicular system and retrieved in the nose with a Huerta hook. A 5-0 Prolene was used to tie the stent together to prevent prolapse. Orbicularis layer was closed with running 5-0 Monocryl suture. Skin was closed with running 6-0 plain gut suture. The silicone tubes were tied in a square knot and were trimmed at the naris.  Erythromycin ophthalmic ointment was applied to the incision. The patient tolerated the procedure well and  left the operating room in stable condition.   Felicia Bai MD

## 2024-08-22 NOTE — ANESTHESIA CARE TRANSFER NOTE
Patient: Alma Michele    Procedure: Procedure(s):  Right dacryocystorhinostomy       Diagnosis: Dermatochalasis of both upper eyelids [H02.831, H02.834]  Ptosis of both eyelids [H02.403]  Diagnosis Additional Information: No value filed.    Anesthesia Type:   No value filed.     Note:    Oropharynx: spontaneously breathing  Level of Consciousness: awake  Oxygen Supplementation: room air    Independent Airway: airway patency satisfactory and stable  Dentition: dentition unchanged  Vital Signs Stable: post-procedure vital signs reviewed and stable  Report to RN Given: handoff report given  Patient transferred to: PACU  Comments: Resps easy and regular. Report to PACU RN  Handoff Report: Identifed the Patient, Identified the Reponsible Provider, Reviewed the pertinent medical history, Discussed the surgical course, Reviewed Intra-OP anesthesia mangement and issues during anesthesia, Set expectations for post-procedure period and Allowed opportunity for questions and acknowledgement of understanding      Vitals:  Vitals Value Taken Time   /71 08/22/24 1015   Temp 36.1  C (97  F) 08/22/24 1013   Pulse     Resp 16 08/22/24 1015   SpO2 97 % 08/22/24 1015       Electronically Signed By: SANTI TRAN CRNA  August 22, 2024  10:19 AM

## 2024-08-22 NOTE — ANESTHESIA PREPROCEDURE EVALUATION
Anesthesia Pre-Procedure Evaluation    Patient: Alma Michele   MRN: [id number] : 1945        Procedure : Procedure(s):  Right dacryocystorhinostomy          Past Medical History:   Diagnosis Date    Essential hypertension     Osteoporosis       Past Surgical History:   Procedure Laterality Date    INJECT TRIGGER POINT Right 2023    Procedure: ultrasound-guided right piriformis muscle injection;  Surgeon: Gary Renee MD;  Location: [facility] OR      No Known Allergies   Social History     Tobacco Use    Smoking status: Never     Passive exposure: Never    Smokeless tobacco: Never   Substance Use Topics    Alcohol use: Yes     Alcohol/week: 14.0 standard drinks of alcohol     Types: 14 Standard drinks or equivalent per week     Comment: 14 drinks/week      Wt Readings from Last 1 Encounters:   24 46.7 kg (103 lb)        Anesthesia Evaluation            ROS/MED HX  ENT/Pulmonary:       Neurologic:       Cardiovascular:     (+)  hypertension- -   -  - -                                      METS/Exercise Tolerance:     Hematologic:       Musculoskeletal:       GI/Hepatic:       Renal/Genitourinary:       Endo:       Psychiatric/Substance Use:       Infectious Disease:       Malignancy:       Other:            Physical Exam    Airway  airway exam normal      Mallampati: II   TM distance: > 3 FB   Neck ROM: full   Mouth opening: > 3 cm    Respiratory Devices and Support         Dental       (+) Completely normal teeth      Cardiovascular          Rhythm and rate: regular and normal     Pulmonary   pulmonary exam normal        breath sounds clear to auscultation           OUTSIDE LABS:  CBC:   Lab Results   Component Value Date    WBC 6.3 2022    WBC 4.6 2020    HGB 12.6 2022    HGB 12.7 2020    HCT 38.7 2022    HCT 38.3 2020     2022     2020     BMP:   Lab Results   Component Value Date     (L) 2024     (L) 2023     "POTASSIUM 4.4 08/14/2024    POTASSIUM 4.5 09/26/2023    CHLORIDE 97 (L) 08/14/2024    CHLORIDE 98 09/26/2023    CO2 25 08/14/2024    CO2 24 09/26/2023    BUN 12.1 08/14/2024    BUN 13.0 09/26/2023    CR 0.66 08/14/2024    CR 0.60 09/26/2023    GLC 91 08/14/2024    GLC 91 09/26/2023     COAGS: No results found for: \"PTT\", \"INR\", \"FIBR\"  POC: No results found for: \"BGM\", \"HCG\", \"HCGS\"  HEPATIC:   Lab Results   Component Value Date    ALBUMIN 4.6 08/03/2023    PROTTOTAL 7.1 08/03/2023    ALT 15 08/03/2023    AST 24 08/03/2023    ALKPHOS 57 08/03/2023    BILITOTAL 0.6 08/03/2023     OTHER:   Lab Results   Component Value Date    TIMOTHY 9.0 08/14/2024    PHOS 3.9 08/14/2024    TSH 2.24 08/03/2023    T4 1.22 08/03/2023       Anesthesia Plan    ASA Status:  1    NPO Status:  NPO Appropriate    Anesthesia Type: General.     - Airway: LMA   Induction: Intravenous.   Maintenance: Balanced.        Consents    Anesthesia Plan(s) and associated risks, benefits, and realistic alternatives discussed. Questions answered and patient/representative(s) expressed understanding.     - Discussed:     - Discussed with:  Patient      - Extended Intubation/Ventilatory Support Discussed: No.      - Patient is DNR/DNI Status: No     Use of blood products discussed: No .     Postoperative Care    Pain management: IV analgesics, Oral pain medications, Multi-modal analgesia.   PONV prophylaxis: Ondansetron (or other 5HT-3), Background Propofol Infusion     Comments:               Piero Lopez MD    I have reviewed the pertinent notes and labs in the chart from the past 30 days and (re)examined the patient.  Any updates or changes from those notes are reflected in this note.     # Hyponatremia: Lowest Na = 133 mmol/L in last 30 days, will monitor as appropriate          # Cachexia: Estimated body mass index is 17.68 kg/m  as calculated from the following:    Height as of this encounter: 1.626 m (5' 4\").    Weight as of this encounter: 46.7 " kg (103 lb).

## 2024-08-22 NOTE — ANESTHESIA PROCEDURE NOTES
Airway       Patient location during procedure: OR  Staff -        Performed By: CRNAIndications and Patient Condition       Indications for airway management: marleen-procedural       Induction type:intravenous       Mask difficulty assessment: 1 - vent by mask    Final Airway Details       Final airway type: supraglottic airway    Supraglottic Airway Details        Type: LMA       Brand: LMA Unique       LMA size: 4    Post intubation assessment        Placement verified by: capnometry and chest rise        Number of attempts at approach: 1       Number of other approaches attempted: 0       Ease of procedure: easy       Dentition: Intact and Unchanged

## 2024-08-22 NOTE — DISCHARGE INSTRUCTIONS
Tylenol 975 mg given at 8:30AM.   Ok to take more after 2:30PM.      Post-operative Instructions  Ophthalmic Plastic and Reconstructive Surgery    Felicia Bai M.D.    All instructions apply to the operated eye(s) or eyelid(s).  Wound care and personal care    ? If a patch or bandage has been placed, please leave this in place until seen by your physician. Ensure that the bandage does not get wet when you take a shower.  ? Apply ice compresses 15 minutes of every hour off while awake for 2 days. As long as there is no further bleeding, after two days, switch to warm water compresses for five minutes, four times a day until seen by your physician. Instead of warm water compresses, you can use a cup of dry uncooked rice in a clean cotton sock. Place sock in microwave for 30 seconds to one minute. Next place the warm sock in a plastic bag, and place it over a clean warm wet washcloth over operated eye.    ? You may shower or wash your hair the day after surgery. Do not bathe or go swimming for 1 week to prevent contamination of your wounds.  ? Do not apply make-up to the eyes or eyelids for 2 weeks after surgery.  ? Expect some swelling, bruising, black eye (even into the lower eyelids and cheeks). Also expect serum caking, crusting and discharge from the eye and/or incisions. A small amount of surface bleeding, and depending on the type of surgery, bleeding from the inside of the eyelid, is normal for the first 48 hours.  ? Avoid straining, bending at the waist, or lifting more than 15 pounds for 10 days. Activities that raise your blood pressure can worsen swelling, cause bleeding, and breaking of sutures. Like wise, sleeping with your head slightly elevated for the first several days can help swelling resolve more quickly.   ? Do continue to ambulate (walk) as you normally would - being sedentary after surgery can cause blood clots.   ? Your eye(s) and eyelid(s) may be painful and tender. This is normal after  surgery.  ? If you had surgery involving the nose or tear drainage system, the following wound  care instructions also apply to you :  ? Do not pull at the small tube in the nose or corner of the eye.    Contact information and follow-up  ? Return to the Eye Clinic for a follow-up appointment with your physician as  scheduled. If no appointment has been scheduled:   - If you are seen at the [facility], call [phone] for an appointment with Dr. Bai within 1 to 2 weeks from your date of surgery. If you are scheduled for a phone or video visit for your first postoperative appointment, please e-mail pictures to [email or web address] 1-2 days before your appointment.     ? For severe pain, bleeding, or loss of vision, during business hours call the [facility] at [phone]     After hours or on weekends and holidays, call [phone] and ask to speak with the ophthalmologist on call.    An on call person can be reached after hours for concerns. The on call doctor should not call in medication refill requests after hours or on weekends, so please plan accordingly. An effort has been made to provide adequate pain medications following every surgery, and refills will not be provided in most instances. Narcotic pain medications cannot be called in.     ? You may not resume driving if you are using narcotic pain medications  (such as Vicodin, Norco, Percocet, Tylenol #3, Oxycodone).    Medications  ? Restart all your regular home medications and eye drops. If you take Plavix or Aspirin on a regular basis, wait for 72 hours after your surgery before restarting these in order to decrease the risk of bleeding complications.  ? Avoid aspirin and aspirin-like medications (Motrin, Aleve, Ibuprofen, Maryann[place] etc) for 72 hours to reduce the risk of bleeding. You may take Tylenol  (acetaminophen) for pain.  ? In addition to your home medications, take the following  post-operative medications as prescribed by your physician.    ? Apply antibiotic ointment to all sutures three times a day.   ? If prescribed, take antibiotics as directed.   ? Instill eye drops 3 times a day for 10 days.   ? Take pain medication at prescribed frequency as needed for pain.   - Use flonase spray and saline nasal spray as prescribed.    ? WARNING: Some prescription pain medications contain Tylenol (acetaminophen). You must not take more than 4,000 mg of acetaminophen per 24-hour period. This is equivalent to 12 tablets of Percocet, Norco or Tylenol #3. If you take other over-the-counter medications containing acetaminophen, you must take the amount of acetaminophen into account and reduce the number of prescribed pain pills accordingly.  ? The prescribed medications may make you drowsy. You must not drive a car, operate heavy machinery or drink alcohol while taking them.  ? The prescribed pain medications may cause constipation and nausea. Take them with some food to prevent a stomach upset. If you continue to experience nausea, call your physician.    [facility]  Home Care Following Anesthesia  For 24 hours after surgery:  Get plenty of rest.  A responsible adult must stay with you for at least 24 hours after you leave the surgery center.  Do not drive or use heavy equipment.  If you have weakness or tingling, don't drive or use heavy equipment until this feeling goes away.   Do not drink alcohol.   Avoid strenuous or risky activities.  Ask for help when climbing stairs.  You may feel lightheaded.  IF so, sit for a few minutes before standing.  Have someone help you get up.   If you have nausea (feel sick to your stomach): Drink only clear liquids such as apple juice, ginger ale, broth or 7-Up.  Rest may also help.  Be sure to drink enough fluids.  Move to a regular diet as you feel able.   You may have a slight fever.  Call the doctor if your fever is over  100 F (37.7 C) (taken under the tongue) or lasts longer than 24 hours.  You may have a dry mouth, a sore throat, muscle aches or trouble sleeping. These should go away after 24 hours.  Do not make important or legal decisions.   It is recommended to avoid smoking.               Tips for taking pain medications  To get the best pain relief possible, remember these points:  Take pain medications as directed, before pain becomes severe.  Pain medication can upset your stomach: taking it with food may help.  Constipation is a common side effect of pain medication. Drink plenty of  fluids.  Eat foods high in fiber. Take a stool softener if recommended by your doctor or pharmacist.  Do not drink alcohol, drive or operate machinery while taking pain medications.  Ask about other ways to control pain, such as with heat, ice or relaxation.    Tylenol/Acetaminophen Consumption    If you feel your pain relief is insufficient, you may take Tylenol/Acetaminophen in addition to your narcotic pain medication.   Be careful not to exceed 4,000 mg of Tylenol/Acetaminophen in a 24 hour period from all sources.  If you are taking extra strength Tylenol/acetaminophen (500 mg), the maximum dose is 8 tablets in 24 hours.  If you are taking regular strength acetaminophen (325 mg), the maximum dose is 12 tablets in 24 hours.    Call a doctor for any of the following:  Signs of infection (fever, growing tenderness at the surgery site, a large amount of drainage or bleeding, severe pain, foul-smelling drainage, redness, swelling).  It has been over 8 to 10 hours since surgery and you are still not able to urinate (pass water).  Headache for over 24 hours.  Numbness, tingling or weakness the day after surgery (if you had spinal anesthesia).  Signs of Covid-19 infection (temperature over 100 degrees, shortness of breath, cough, loss of taste/smell, generalized body aches, persistent headache, chills, sore throat, nausea/vomiting/diarrhea)  Your  doctor is:       Dr. Felicia Bai, Ophthalmology: [phone]               Or dial [phone] and ask for the resident on call for:  Ophthalmology  For emergency care, call the:  [facility] Emergency Department: [phone] (TTY for hearing impaired: [phone])

## 2024-08-22 NOTE — ANESTHESIA POSTPROCEDURE EVALUATION
Patient: Alma Michele    Procedure: Procedure(s):  Right dacryocystorhinostomy       Anesthesia Type:  No value filed.    Note:  Disposition: Outpatient   Postop Pain Control: Uneventful            Sign Out: Well controlled pain   PONV: No   Neuro/Psych: Uneventful            Sign Out: Acceptable/Baseline neuro status   Airway/Respiratory: Uneventful            Sign Out: Acceptable/Baseline resp. status   CV/Hemodynamics: Uneventful            Sign Out: Acceptable CV status   Other NRE: NONE   DID A NON-ROUTINE EVENT OCCUR? No           Last vitals:  Vitals Value Taken Time   /75 08/22/24 1023   Temp 36.1  C (97  F) 08/22/24 1023   Pulse 76 08/22/24 1023   Resp 16 08/22/24 1023   SpO2 97 % 08/22/24 1023       Electronically Signed By: Piero Lopez MD  August 22, 2024  2:12 PM

## 2024-08-22 NOTE — BRIEF OP NOTE
[facility] Brief Operative Note    Pre-operative diagnosis: Dermatochalasis of both upper eyelids [H02.831, H02.834]  Ptosis of both eyelids [H02.403]   Post-operative diagnosis Same as above   Procedure: Procedure(s):  Right dacryocystorhinostomy   Surgeon(s): Surgeons and Role:     * Felicia Bai MD - Primary     * Akshat Lala MD - Resident - Assisting     * Leonarda Valera MD - Fellow - Assisting   Estimated blood loss: * No values recorded between 8/22/2024  9:38 AM and 8/22/2024 10:06 AM *    Specimens: ID Type Source Tests Collected by Time Destination   1 : Right lacrimal sack Tissue Eye, Right SURGICAL PATHOLOGY EXAM Felicia Bai MD 8/22/2024  9:51 AM       Findings: As expected

## 2024-08-23 LAB
LOCATION OF TASK: NORMAL
PATH REPORT.COMMENTS IMP SPEC: NORMAL
PATH REPORT.COMMENTS IMP SPEC: NORMAL
PATH REPORT.FINAL DX SPEC: NORMAL
PATH REPORT.GROSS SPEC: NORMAL
PATH REPORT.MICROSCOPIC SPEC OTHER STN: NORMAL
PATH REPORT.RELEVANT HX SPEC: NORMAL
PHOTO IMAGE: NORMAL

## 2024-09-05 ENCOUNTER — INFUSION THERAPY VISIT (OUTPATIENT)
Dept: INFUSION THERAPY | Facility: CLINIC | Age: 79
End: 2024-09-05
Attending: ORTHOPAEDIC SURGERY
Payer: MEDICARE

## 2024-09-05 VITALS
BODY MASS INDEX: 18.07 KG/M2 | HEIGHT: 63 IN | TEMPERATURE: 97.8 F | WEIGHT: 102 LBS | SYSTOLIC BLOOD PRESSURE: 119 MMHG | RESPIRATION RATE: 16 BRPM | DIASTOLIC BLOOD PRESSURE: 73 MMHG | OXYGEN SATURATION: 99 %

## 2024-09-05 DIAGNOSIS — M81.0 AGE-RELATED OSTEOPOROSIS WITHOUT CURRENT PATHOLOGICAL FRACTURE: Primary | ICD-10-CM

## 2024-09-05 PROCEDURE — 250N000013 HC RX MED GY IP 250 OP 250 PS 637: Performed by: INTERNAL MEDICINE

## 2024-09-05 PROCEDURE — 250N000011 HC RX IP 250 OP 636: Performed by: INTERNAL MEDICINE

## 2024-09-05 PROCEDURE — 258N000003 HC RX IP 258 OP 636: Performed by: INTERNAL MEDICINE

## 2024-09-05 PROCEDURE — 96365 THER/PROPH/DIAG IV INF INIT: CPT

## 2024-09-05 RX ORDER — ACETAMINOPHEN 325 MG/1
650 TABLET ORAL
Status: CANCELLED | OUTPATIENT
Start: 2025-09-05

## 2024-09-05 RX ORDER — ALBUTEROL SULFATE 0.83 MG/ML
2.5 SOLUTION RESPIRATORY (INHALATION)
Status: CANCELLED | OUTPATIENT
Start: 2025-09-05

## 2024-09-05 RX ORDER — HEPARIN SODIUM (PORCINE) LOCK FLUSH IV SOLN 100 UNIT/ML 100 UNIT/ML
5 SOLUTION INTRAVENOUS
Status: CANCELLED | OUTPATIENT
Start: 2025-09-05

## 2024-09-05 RX ORDER — ACETAMINOPHEN 325 MG/1
650 TABLET ORAL
Status: DISCONTINUED | OUTPATIENT
Start: 2024-09-05 | End: 2024-09-05

## 2024-09-05 RX ORDER — ALBUTEROL SULFATE 90 UG/1
1-2 AEROSOL, METERED RESPIRATORY (INHALATION)
Status: CANCELLED
Start: 2025-09-05

## 2024-09-05 RX ORDER — ZOLEDRONIC ACID 5 MG/100ML
5 INJECTION, SOLUTION INTRAVENOUS ONCE
Status: CANCELLED
Start: 2025-09-05

## 2024-09-05 RX ORDER — METHYLPREDNISOLONE SODIUM SUCCINATE 125 MG/2ML
125 INJECTION, POWDER, LYOPHILIZED, FOR SOLUTION INTRAMUSCULAR; INTRAVENOUS
Status: CANCELLED
Start: 2025-09-05

## 2024-09-05 RX ORDER — ZOLEDRONIC ACID 5 MG/100ML
5 INJECTION, SOLUTION INTRAVENOUS ONCE
Status: COMPLETED | OUTPATIENT
Start: 2024-09-05 | End: 2024-09-05

## 2024-09-05 RX ORDER — HEPARIN SODIUM,PORCINE 10 UNIT/ML
5-20 VIAL (ML) INTRAVENOUS DAILY PRN
Status: CANCELLED | OUTPATIENT
Start: 2025-09-05

## 2024-09-05 RX ORDER — EPINEPHRINE 1 MG/ML
0.3 INJECTION, SOLUTION INTRAMUSCULAR; SUBCUTANEOUS EVERY 5 MIN PRN
Status: CANCELLED | OUTPATIENT
Start: 2025-09-05

## 2024-09-05 RX ORDER — DIPHENHYDRAMINE HYDROCHLORIDE 50 MG/ML
50 INJECTION INTRAMUSCULAR; INTRAVENOUS
Status: CANCELLED
Start: 2025-09-05

## 2024-09-05 RX ADMIN — ACETAMINOPHEN 650 MG: 325 TABLET ORAL at 14:37

## 2024-09-05 RX ADMIN — SODIUM CHLORIDE 50 ML: 9 INJECTION, SOLUTION INTRAVENOUS at 14:39

## 2024-09-05 RX ADMIN — ZOLEDRONIC ACID 5 MG: 0.05 INJECTION, SOLUTION INTRAVENOUS at 14:39

## 2024-09-05 NOTE — PROGRESS NOTES
Infusion Nursing Note:  Alma Michele presents today for Patient presents with:  Infusion: Reclast      Patient seen by provider today: No   present during visit today: No    Note: During today's [facility] appointment, orders from Dr. Oreilly were completed.  Patient identification verified by name and date of birth.  Assessment completed.  Vitals recorded in Doc Flowsheets.  Patient was provided with education regarding medication/procedure and possible side effects.  Patient verbalized understanding.  Premedications:Given per orders    Administrations This Visit       acetaminophen (TYLENOL) tablet 650 mg       Admin Date  09/05/2024 Action  $Given Dose  650 mg Route  Oral Documented By  Ana Lilia Alvarez, FARIDEH              sodium chloride 0.9% BOLUS 250 mL       Admin Date  09/05/2024 Action  $New Bag Dose  50 mL Route  Intravenous Documented By  Ana Lilia Alvarez, FARIDEH              zoledronic acid (RECLAST) infusion 5 mg       Admin Date  09/05/2024 Action  $New Bag Dose  5 mg Rate  200 mL/hr Route  Intravenous Documented By  Ana Lilia Alvarez RN                      Intravenous Access:  Peripheral IV placed.    Treatment Conditions:  Results reviewed, labs MET treatment parameters, ok to proceed with treatment.  Creatinine   Date Value Ref Range Status   08/14/2024 0.66 0.51 - 0.95 mg/dL Final    ((140-78.401)*[id number])*(0.7+(1*0.15))/(0.66*72)*(1*(3-1)/2) = 50.978     Post Infusion Assessment:  Patient tolerated infusion without incident.  Site patent and intact, free from redness, edema or discomfort.  No evidence of extravasations.  Access discontinued per protocol.       Discharge Plan:   Discharge instructions reviewed with: Patient.  AVS to patient via [employer].  Patient will return   Future Appointments   Date Time Provider Department Center   9/10/2024  9:45 AM Felicia Bai MD [facility]   6/12/2025 10:00 AM Denilson Blunt MD [facility]      for next  "appointment.   Patient discharged in stable condition accompanied by: self.  Departure Mode: Ambulatory    Ht 1.607 m (5' 3.25\")   Wt 46.3 kg (102 lb)   LMP  (LMP Unknown)   BMI 17.93 kg/m    Ana Lilia Alvarez RN on 9/5/2024 at 2:21 PM  .  "

## 2024-09-05 NOTE — PATIENT INSTRUCTIONS
Dear Alma Michele    Thank you for choosing [facility] ([facility] for your infusion.  The following information is a summary of our appointment as well as important reminders.      If you are a transplant patient and require transplant education, please click on this link: [email or web address].    If you have any questions on your upcoming Specialty Infusion appointments, please call scheduling at [phone].  It was a pleasure taking care of you today.    Sincerely,    [facility]  [facility]  [address] [place] [address]  [id number]  Phone:  [phone]

## 2024-09-10 ENCOUNTER — OFFICE VISIT (OUTPATIENT)
Dept: OPHTHALMOLOGY | Facility: CLINIC | Age: 79
End: 2024-09-10
Payer: MEDICARE

## 2024-09-10 DIAGNOSIS — H04.551 ACQUIRED OBSTRUCTION OF RIGHT NASOLACRIMAL DUCT: Primary | ICD-10-CM

## 2024-09-10 PROCEDURE — 99024 POSTOP FOLLOW-UP VISIT: CPT | Mod: GC | Performed by: OPHTHALMOLOGY

## 2024-09-10 ASSESSMENT — VISUAL ACUITY
OD_SC: 20/70
OD_SC+: -2
OS_SC: 20/30
METHOD: SNELLEN - LINEAR

## 2024-09-10 ASSESSMENT — TONOMETRY
IOP_METHOD: ICARE
OD_IOP_MMHG: 18
IOP_METHOD: ICARE
OS_IOP_MMHG: 11
OD_IOP_MMHG: 18

## 2024-09-10 NOTE — PROGRESS NOTES
Chief Complaints and History of Present Illnesses   Patient presents with    Post Op (Ophthalmology) Right Eye     Status post right dacryocystorhinostomy with lacrimal sac biopsy on 8/22/24      Chief Complaint(s) and History of Present Illness(es)     Post Op (Ophthalmology) Right Eye    In right eye.  This started 1 month ago. Additional comments: Status post   right dacryocystorhinostomy with lacrimal sac biopsy on 8/22/24            Comments    Tearing has been controlled.  Had a little nosebleed last night but that   was the only one.  Has some some tenderness but no true pain.      Susan Gil on 9/10/2024 at 9:30 AM                      Assessment & Plan     Alma Michele is a 79 year old female with the following diagnoses:   1. Acquired obstruction of right nasolacrimal duct         POW 2.5 s/p right DCR with silicone intubation on 8/22/24. 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     Healing well. Post op instructions reviewed.  Follow-up for stent removal. Would like eval for ptosis and lower Blepharoplasty, and would like to proceed after returning in the spring.         Akshat Lala MD  Resident Physician, PGY-2  Department of Ophthalmology  09/10/2024 9:52 AM     Attending Physician Attestation:  Complete documentation of historical and exam elements from today's encounter can be found in the full encounter summary report (not reduplicated in this progress note).  I personally obtained the chief complaint(s) and history of present illness.  I confirmed and edited as necessary the review of systems, past medical/surgical history, family history, social history, and examination findings as documented by others; and I examined the patient myself.  I personally reviewed the relevant tests, images, and reports as documented above.  I formulated and edited as necessary the assessment and plan and discussed the findings and management plan with the  patient and family. I personally reviewed the ophthalmic test(s) associated with this encounter, agree with the interpretation(s) as documented by the resident/fellow, and have edited the corresponding report(s) as necessary.   -Smooth Tena MD  9:41 AM 9/10/2024

## 2024-09-10 NOTE — NURSING NOTE
Chief Complaints and History of Present Illnesses   Patient presents with    Post Op (Ophthalmology) Right Eye     Status post right dacryocystorhinostomy with lacrimal sac biopsy on 8/22/24      Chief Complaint(s) and History of Present Illness(es)       Post Op (Ophthalmology) Right Eye              Laterality: right eye    Onset: 1 month ago    Comments: Status post right dacryocystorhinostomy with lacrimal sac biopsy on 8/22/24               Comments    Tearing has been controlled.  Had a little nosebleed last night but that was the only one.  Has some some tenderness but no true pain.      Susan Gil on 9/10/2024 at 9:30 AM

## 2024-09-10 NOTE — PATIENT INSTRUCTIONS
"- Apply warm compresses for 1 minute two times a day.  - Apply Aquaphor or Vaseline to the incision at bedtime until it is smooth.    - The Flonase spray, and neomycin/polymixin/dexamethasone drop should be stopped.  - Continue saline nasal spray twice a day.  - If you have symptoms of eye irritation, it is good to use over the counter artificial tears. Good brands include Refresh, Blink, and Systane. Do NOT get drops that are for \"red eyes.\"   - It is normal for the incision to appear raised, red, itch and have small lumps. You can gently massage any small bumps along the incision line. These can take up to six months to resolve.    "

## 2024-09-12 NOTE — RESULT ENCOUNTER NOTE
Mrs Michele,     Your urine calcium is not elevated. Your Vitamin is elevated, as we discussed at the time of your visit, you should decrease your Vitamin D supplementation.     Denilson Gallardo

## 2024-09-23 ENCOUNTER — NURSE TRIAGE (OUTPATIENT)
Dept: NURSING | Facility: CLINIC | Age: 79
End: 2024-09-23
Payer: COMMERCIAL

## 2024-09-23 NOTE — TELEPHONE ENCOUNTER
"Last Wednesday notice a stitch on surgical area. This is protruding out, this has happen in the last 5 days.   Wondering if this will dissolve or if holding the tube    No pain  No drainage, no redness or swelling        8-22-24  Right dacryocystorhinostomy    Felicia Bai MD    High priority note to Eye clinic for call back w/ plan.    Pt # [phone]      Reason for Disposition   Caller has URGENT question and triager unable to answer question    Additional Information   Negative: Chest pain   Negative: Difficulty breathing   Negative: Acting confused (e.g., disoriented, slurred speech) or excessively sleepy   Negative: Surgical incision symptoms and questions   Negative: Pain or burning with passing urine (urination) and male   Negative: Pain or burning with passing urine (urination) and female   Negative: Constipation   Negative: New or worsening leg (calf, thigh) pain   Negative: New or worsening leg swelling   Negative: Dizziness is severe, or persists > 24 hours after surgery   Negative: Symptoms arising from use of a urinary catheter (Méndez or Coude)   Negative: Cast problems or questions   Negative: Medication question   Negative: Bright red, wide-spread, sunburn-like rash   Negative: Fever > 100.4 F (38.0 C)   Negative: SEVERE headache and after spinal (epidural) anesthesia   Negative: Vomiting and persists > 4 hours   Negative: Vomiting and abdomen looks much more swollen than usual   Negative: Drinking very little and dehydration suspected (e.g., no urine > 12 hours, very dry mouth, very lightheaded)   Negative: Patient sounds very sick or weak to the triager   Negative: Sounds like a serious complication to the triager    Answer Assessment - Initial Assessment Questions  1. SYMPTOM: \"What's the main symptom you're concerned about?\" (e.g., pain, fever, vomiting)        Last Wednesday notice a stitch on surgical area. This is protruding out, this has happen in the last 5 days.   Wondering if this " "will dissolve or if holding the tube    No pain  No drainage, no redness or swelling      2. ONSET: \"When did   start?\"      Last 5 days      3. SURGERY: \"What surgery did you have?\"      Right dacryocystorhinostomy    Felicia Bai MD    4. DATE of SURGERY: \"When was the surgery?\"       8-22-24      5. ANESTHESIA: \" What type of anesthesia did you have?\" (e.g., general, spinal, epidural, local)        6. PAIN: \"Is there any pain?\" If Yes, ask: \"How bad is it?\"  (Scale 1-10; or mild, moderate, severe)      no  7. FEVER: \"Do you have a fever?\" If Yes, ask: \"What is your temperature, how was it measured, and when did it start?\"      no  8. VOMITING: \"Is there any vomiting?\" If Yes, ask: \"How many times?\"      no  9. BLEEDING: \"Is there any bleeding?\" If Yes, ask: \"How much?\" and \"Where?\"      no  10. OTHER SYMPTOMS: \"Do you have any other symptoms?\" (e.g., drainage from wound, painful urination, constipation)        no    Protocols used: Post-Op Symptoms and [id number]    "

## 2024-09-23 NOTE — TELEPHONE ENCOUNTER
I called and spoke to the patient. She assured me it is not the stent, she can tell where the stent is. The suture is just a little long and she asked if her daughter who is a nurse can trim it a little. I said it is ok to shorten it a little if needed but not to cut any knots. No other questions or concerns.    Clarita Velasquez RN RN 1:56 PM 09/23/24

## 2024-09-26 ENCOUNTER — TELEPHONE (OUTPATIENT)
Dept: OPHTHALMOLOGY | Facility: CLINIC | Age: 79
End: 2024-09-26
Payer: COMMERCIAL

## 2024-09-26 NOTE — TELEPHONE ENCOUNTER
Received message that the patient needs to reschedule her appointment on 10-29 with Dr. Bai for a 7 week post op. I called her and left her a message to call me back directly.     Terri Grier  Surgery Scheduling Coordinator  Ph: [phone]

## 2024-09-30 ENCOUNTER — TELEPHONE (OUTPATIENT)
Dept: OPHTHALMOLOGY | Facility: CLINIC | Age: 79
End: 2024-09-30
Payer: COMMERCIAL

## 2024-09-30 NOTE — TELEPHONE ENCOUNTER
Patient called to reschedule POST-OP for a week earlier due to a schedule conflict. Rescheduled patient accordingly and patient will see appointment in [facility]Per Patient

## 2024-10-01 ENCOUNTER — TRANSFERRED RECORDS (OUTPATIENT)
Dept: HEALTH INFORMATION MANAGEMENT | Facility: CLINIC | Age: 79
End: 2024-10-01
Payer: COMMERCIAL

## 2024-10-22 ENCOUNTER — OFFICE VISIT (OUTPATIENT)
Dept: OPHTHALMOLOGY | Facility: CLINIC | Age: 79
End: 2024-10-22
Payer: MEDICARE

## 2024-10-22 DIAGNOSIS — H04.551 ACQUIRED OBSTRUCTION OF RIGHT NASOLACRIMAL DUCT: ICD-10-CM

## 2024-10-22 DIAGNOSIS — H02.403 PTOSIS OF BOTH EYELIDS: Primary | ICD-10-CM

## 2024-10-22 PROCEDURE — 92285 EXTERNAL OCULAR PHOTOGRAPHY: CPT | Performed by: OPHTHALMOLOGY

## 2024-10-22 PROCEDURE — 99024 POSTOP FOLLOW-UP VISIT: CPT | Performed by: OPHTHALMOLOGY

## 2024-10-22 PROCEDURE — 92082 INTERMEDIATE VISUAL FIELD XM: CPT | Performed by: OPHTHALMOLOGY

## 2024-10-22 ASSESSMENT — VISUAL ACUITY
OD_SC+: -2
METHOD: SNELLEN - LINEAR
OD_SC: 20/60
OD_PH_SC: 20/50
OS_SC: 20/25

## 2024-10-22 ASSESSMENT — TONOMETRY
OS_IOP_MMHG: 9
OD_IOP_MMHG: 110
IOP_METHOD: ICARE

## 2024-10-22 ASSESSMENT — CONF VISUAL FIELD
OS_SUPERIOR_TEMPORAL_RESTRICTION: 3
OD_SUPERIOR_TEMPORAL_RESTRICTION: 3
OD_SUPERIOR_NASAL_RESTRICTION: 3

## 2024-10-22 NOTE — NURSING NOTE
Chief Complaints and History of Present Illnesses   Patient presents with    Consult For     Consult for Lid Evaluation today each eye and follow up due to S/p right DCR with silicone intubation on 8/22/24 with possible stent removal      Chief Complaint(s) and History of Present Illness(es)       Consult For              Laterality: both eyes    Treatments tried: ointment    Pain scale: 0/10    Comments: Consult for Lid Evaluation today each eye and follow up due to S/p right DCR with silicone intubation on 8/22/24 with possible stent removal               Comments    Hopeful to get stent out today. Still a little tender to touch in the nasal corner when a bump can be felt.   Questions if the stent is coming out a bit noticed a clear protrusion some time ago ago hard to note time but sure this can still be observed. Not having any issues with excessive tearing or discharge.     Bothered by upper lids drooping with RUL coming down lower than JOJO the past year.   Has noticed raising lids manually its easier to read.   Feel like she is not seeing things as well as she should in the periphery on the right side.    Alexis ointment used Q HS each eye.     DAVID Ramires COT 11:08 AM October 22, 2024

## 2024-10-22 NOTE — PROGRESS NOTES
Oculoplastic Clinic     Patient: Alma Michele MRN# [id number]   YOB: 1945 Age: 79 year old   Date of Visit: Oct 22, 2024    CC: Droopy eyelids obstructing vision.              HPI:     Chief Complaint(s) and History of Present Illness(es)     Consult For            Laterality: both eyes    Treatments tried: ointment    Pain scale: 0/10    Comments: Consult for Lid Evaluation today each eye and follow up due to   S/p right DCR with silicone intubation on 8/22/24 with possible stent   removal           Comments    Hopeful to get stent out today. Still a little tender to touch in the   nasal corner when a bump can be felt.   Questions if the stent is coming out a bit noticed a clear protrusion some   time ago ago hard to note time but sure this can still be observed. Not   having any issues with excessive tearing or discharge.     Bothered by upper lids drooping with RUL coming down lower than JOJO the   past year.   Has noticed raising lids manually its easier to read.   Feel like she is not seeing things as well as she should in the periphery   on the right side.    Alexis ointment used Q HS each eye.     Charisse Paniagua, COT COT 11:08 AM October 22, 2024     The droopy right upper eyelid is interfering with activities of daily living including driving, and reading. The patient denies double vision, variability of the eyelid position, or dry eye symptoms. The tearing has resolved since Dacryocystorhinostomy (DCR).     EXAM:     MRD1: 1 mm right eye and 3 mm left eye   Aponeurotic ptosis.  Minimal enophthalmos (maybe 1 mm) on worms eye view right relative to left.     VISUAL FIELD:  Right eye untaped:10 degrees Right eye taped:50 degrees  Left eye untaped:15 degrees Left eye taped:50 degrees    Assessment & Plan     Alma Michele is a 79 year old female with the following diagnoses:   1. Ptosis of both eyelids    2. Acquired obstruction of right nasolacrimal duct       Stent was removed on the right and  irrigation was widely patent. Can massage medial canthal area.     Consider right upper eyelid ptosis repair posterior approach 8 mm [id number]    The heavy upper eyelids are causing symptoms as documented above. The condition is not secondary to underlying Sjogren's, myasthenia gravis, or polymyositis.        PHOTOS DEMONSTRATE:    Blepharoptosis    Attending Physician Attestation: Complete documentation of historical and exam elements from today's encounter can be found in the full encounter summary report (not reduplicated in this progress note). I personally obtained the chief complaint(s) and history of present illness. I confirmed and edited as necessary the review of systems, past medical/surgical history, family history, social history, and examination findings as documented by others; and I examined the patient myself. I personally reviewed the relevant tests, images, and reports as documented above. I formulated and edited as necessary the assessment and plan and discussed the findings and management plan with the patient. Felicia Bai MD      Today with Alma Michele I reviewed the indications, risks, benefits, and alternatives of the proposed surgical procedure including, but not limited to, failure obtain the desired result  and need for additional surgery, bleeding, infection, loss of vision, or injury to the eye, dry eyes, and the remote possibility of permanent damage to any organ system or death with the use of anesthesia.  I provided multiple opportunities for the questions, answered all questions to the best of my ability, and confirmed that my answers and my discussion were understood.

## 2025-03-16 ENCOUNTER — HEALTH MAINTENANCE LETTER (OUTPATIENT)
Age: 80
End: 2025-03-16

## 2025-04-29 ENCOUNTER — TELEPHONE (OUTPATIENT)
Dept: INTERNAL MEDICINE | Facility: CLINIC | Age: 80
End: 2025-04-29
Payer: COMMERCIAL

## 2025-04-29 NOTE — LETTER
April 29, 2025    lAma Michele  [address] [place] [address]    Hello,     Your care team at [facility] values your health and well-being. After reviewing your chart, we have identified recommendation(s) to help you better manage your health.    It's time for your Medicare AWV. During your visit, we'll discuss your health, well-being, and any questions you may have related to preventive care. We'll also review any recommended tests, exams, or screenings you might need. To schedule please call [phone] ([phone]) or use your [employer]App account.     If you recently had or are having any of these services soon, please contact the clinic via phone or [employer]App so that your care team can update your records.    We look forward to seeing you at your upcoming visit.    If you have any questions or concerns, please contact our clinic. Thank you for continuing to trust us with your care.    Sincerely,    Your [facility] Care Team

## 2025-04-29 NOTE — TELEPHONE ENCOUNTER
Patient Quality Outreach    Patient is due for the following:   Physical Annual Wellness Visit    Action(s) Taken:   Patient to call back and schedule an AWV    Type of outreach:    Sent [facility] message.    Questions for provider review:    None         Joy C Steinert, [facility]  Chart routed to None.

## 2025-06-01 SDOH — HEALTH STABILITY: PHYSICAL HEALTH: ON AVERAGE, HOW MANY DAYS PER WEEK DO YOU ENGAGE IN MODERATE TO STRENUOUS EXERCISE (LIKE A BRISK WALK)?: 0 DAYS

## 2025-06-01 SDOH — HEALTH STABILITY: PHYSICAL HEALTH: ON AVERAGE, HOW MANY MINUTES DO YOU ENGAGE IN EXERCISE AT THIS LEVEL?: 0 MIN

## 2025-06-01 ASSESSMENT — SOCIAL DETERMINANTS OF HEALTH (SDOH): HOW OFTEN DO YOU GET TOGETHER WITH FRIENDS OR RELATIVES?: THREE TIMES A WEEK

## 2025-06-04 ENCOUNTER — OFFICE VISIT (OUTPATIENT)
Dept: INTERNAL MEDICINE | Facility: CLINIC | Age: 80
End: 2025-06-04
Payer: MEDICARE

## 2025-06-04 VITALS
OXYGEN SATURATION: 96 % | DIASTOLIC BLOOD PRESSURE: 72 MMHG | TEMPERATURE: 98.1 F | HEIGHT: 63 IN | HEART RATE: 69 BPM | RESPIRATION RATE: 18 BRPM | WEIGHT: 103 LBS | SYSTOLIC BLOOD PRESSURE: 116 MMHG | BODY MASS INDEX: 18.25 KG/M2

## 2025-06-04 DIAGNOSIS — M81.0 AGE-RELATED OSTEOPOROSIS WITHOUT CURRENT PATHOLOGICAL FRACTURE: ICD-10-CM

## 2025-06-04 DIAGNOSIS — R42 POSTURAL DIZZINESS WITH PRESYNCOPE: ICD-10-CM

## 2025-06-04 DIAGNOSIS — R55 POSTURAL DIZZINESS WITH PRESYNCOPE: ICD-10-CM

## 2025-06-04 DIAGNOSIS — M48.062 SPINAL STENOSIS OF LUMBAR REGION WITH NEUROGENIC CLAUDICATION: ICD-10-CM

## 2025-06-04 DIAGNOSIS — R63.6 UNDERWEIGHT: ICD-10-CM

## 2025-06-04 DIAGNOSIS — Z00.00 ROUTINE GENERAL MEDICAL EXAMINATION AT A HEALTH CARE FACILITY: Primary | ICD-10-CM

## 2025-06-04 DIAGNOSIS — E55.9 VITAMIN D DEFICIENCY: ICD-10-CM

## 2025-06-04 DIAGNOSIS — I10 ESSENTIAL HYPERTENSION: ICD-10-CM

## 2025-06-04 LAB
ERYTHROCYTE [DISTWIDTH] IN BLOOD BY AUTOMATED COUNT: 12.2 % (ref 10–15)
EST. AVERAGE GLUCOSE BLD GHB EST-MCNC: 103 MG/DL
HBA1C MFR BLD: 5.2 % (ref 0–5.6)
HCT VFR BLD AUTO: 36.1 % (ref 35–47)
HGB BLD-MCNC: 12.1 G/DL (ref 11.7–15.7)
MCH RBC QN AUTO: 32.4 PG (ref 26.5–33)
MCHC RBC AUTO-ENTMCNC: 33.5 G/DL (ref 31.5–36.5)
MCV RBC AUTO: 97 FL (ref 78–100)
PLATELET # BLD AUTO: 187 10E3/UL (ref 150–450)
RBC # BLD AUTO: 3.74 10E6/UL (ref 3.8–5.2)
WBC # BLD AUTO: 5.2 10E3/UL (ref 4–11)

## 2025-06-04 PROCEDURE — 85027 COMPLETE CBC AUTOMATED: CPT | Performed by: INTERNAL MEDICINE

## 2025-06-04 PROCEDURE — 82306 VITAMIN D 25 HYDROXY: CPT | Performed by: INTERNAL MEDICINE

## 2025-06-04 PROCEDURE — 99214 OFFICE O/P EST MOD 30 MIN: CPT | Mod: 25 | Performed by: INTERNAL MEDICINE

## 2025-06-04 PROCEDURE — 84443 ASSAY THYROID STIM HORMONE: CPT | Mod: GZ | Performed by: INTERNAL MEDICINE

## 2025-06-04 PROCEDURE — 3074F SYST BP LT 130 MM HG: CPT | Performed by: INTERNAL MEDICINE

## 2025-06-04 PROCEDURE — G0439 PPPS, SUBSEQ VISIT: HCPCS | Performed by: INTERNAL MEDICINE

## 2025-06-04 PROCEDURE — 80061 LIPID PANEL: CPT | Performed by: INTERNAL MEDICINE

## 2025-06-04 PROCEDURE — 80053 COMPREHEN METABOLIC PANEL: CPT | Performed by: INTERNAL MEDICINE

## 2025-06-04 PROCEDURE — 36415 COLL VENOUS BLD VENIPUNCTURE: CPT | Performed by: INTERNAL MEDICINE

## 2025-06-04 PROCEDURE — 83036 HEMOGLOBIN GLYCOSYLATED A1C: CPT | Mod: GZ | Performed by: INTERNAL MEDICINE

## 2025-06-04 PROCEDURE — 3044F HG A1C LEVEL LT 7.0%: CPT | Performed by: INTERNAL MEDICINE

## 2025-06-04 PROCEDURE — 3078F DIAST BP <80 MM HG: CPT | Performed by: INTERNAL MEDICINE

## 2025-06-04 RX ORDER — RESPIRATORY SYNCYTIAL VIRUS VACCINE 120MCG/0.5
KIT INTRAMUSCULAR
COMMUNITY
Start: 2025-01-15

## 2025-06-04 NOTE — PATIENT INSTRUCTIONS
Annual preventive visit, overall Alma is doing well.  [place] and then spends summers here in [place].    She told me that she finds a Pilates class to be a enjoyable and good form of core muscle exercise, and I encouraged her to continue that.    Blood pressure looks good today.  We talked about the lightheadedness episodes and I asked her to focus on hydration.  For the time being we will continue the amlodipine.    Will have her stop by the laboratory this afternoon check comprehensive metabolic panel, blood cell counts, lipid profile, hemoglobin A1c, TSH for thyroid, vitamin D level.    Alma plans to pursue screening mammogram in [place].    Alma has been experiencing episodic drops in blood pressure to maybe 90 systolic or less, and I really wonder whether hydration status could be playing a role.  Alma told that she tries to drink multiple glasses of water a day, and I told her that that she might even allow herself some salt intake to help stabilize her volume status.  She should drink enough fluid that she should need to urinate every 2 hours or so during the day.    Essential hypertension, for which Alma resumed the small dose of amlodipine 2.5 mg a day November 2023     Alma is tried experiments of holding the amlodipine, but then her blood pressure might jump up to 150 or 170 systolic according to her home machine.    She did bring her home blood pressure machine to clinic December 1, 2023 and it compares fairly close to a manual reading      25-day cardiac event monitor showing no signs of rhythm disturbance  Cardiac event monitoring from 10/19/2023 to 11/17/2023 (monitored duration 25d 20h).      9-  Sinus rhythm   Low voltage QRS   Borderline ECG   No previous ECGs available   Confirmed by JEANNA FROST MD LOC:[facility] ([id number]) on 9/26/2023 4:09:59 PM      Bilateral epiphora (excessive tears), takes doxycycline low-dose 50 mg once a day, monitored at .  [facility], Status post bilateral intraocular implants.      Nodule left lower eyelid  Does not seem to bother Alma too much, but I would suggest using moisturizing lubricating eyedrops      Underweight, body mass index around 18  Wt Readings from Last 5 Encounters:   06/04/25 46.7 kg (103 lb)   09/05/24 46.3 kg (102 lb)   08/22/24 46.7 kg (103 lb)   08/14/24 47 kg (103 lb 9.6 oz)   06/27/24 47.2 kg (104 lb)     Small amount of wax both tympanic canals, for which I recommended that Sly use over-the-counter wax dissolving eardrops such as Debrox     Successful retina surgery December 6, 2022 for macular pucker  She will be departing for the [facility] after New Year's Day    Spinal canal stenosis  Right L5 transforaminal epidural steroid injection July 14, 2022  Lumbar spine MRI June 16, 2022  Moderate lumbar spondylosis with broad-based disc herniations evident throughout the lumbar spine.  Disc bulging and facet degenerative changes resulting in severe spinal canal stenosis and lateral recess narrowing at L4-5 and moderate to severe spinal canal stenosis at L3-4.     Takes gabapentin 600 mg at bedtime (2 capsules of 300 mg each)  I told Alma that she could try adding a morning dose of 300 mg in case she feels that she needs pain relief in the morning, but she may need to balance the pain relief effects against the brain fog side effect    Osteoarthritis base of the right thumb  A useful tool could be topical diclofenac (Voltaren gel, which is useful in hands, fingers, knees, and ankles, because the joint is relatively close to the skin    Right side piriformis syndrome.  After she had consultation with Dr. Sage Pickett at the , she was given a diagnosis of piriformis syndrome, and got an injection done at the  in January 2023 which was helpful  Did physical therapy twice a week--I am unsure of the impact after doing it for several months    Underwent sacroiliac joint injection on  the right side October 14, 2022  Uses gabapentin to help relieve that pain  Advil also helps, but I asked her to limit that to 800 mg in 24 hours  Ibuprofen I believe she should limit to 800 mg in 24 hours.  That would be for the 200 mg tablets.  I believe that 2 tablets twice a day would be okay, but less would be better     Underwent injection of platelet rich plasma into the right gluteal tendon, for diagnosis of gluteal tendinopathy  Done at [facility] on October 10, 2023    Osteoporosis for which she has been on alendronate for approximately 4 years, stopping in 2019, restarted June 2021  Switch to Reclast infusion (zoledronic acid) administered August 31, 2023, then September 2024 which plan to give that for 2 to 3 years, then drug holiday.  Under supervision of Dr. Gallardo (endocrinology)  Bone density improved when measured Fariba 10, 2024     [facility] endocrinology had recommended her to continue alendronate through 2020 to complete 5 years.  However she decided to stop it in 2019.  DEXA scan was May 2019 done at [facility] with a femoral neck T score of -2.5, lumbar spine T score -2.2.     EXAM: DX TBS AXIAL  LOCATION: [facility]  DATE: 6/10/2024  INDICATION: BMD screening. Follow-up. Bone mineralization medication use (Reclast).  DEMOGRAPHICS: Age- 79 years. Gender- Female. Menopausal status- Postmenopausal.  COMPARISON: 6/7/2023  TECHNIQUE: Dual-energy x-ray absorptiometry (DXA) performed with routine technique.   Trabecular bone score (TBS) analysis performed.     DXA RESULTS  -Lumbar Spine: L1-L2: BMD: 0.848 g/cm2. T-score: -2.6. Z-score: -0.8. L3-L4 omitted from lumbar spine due to greater than 1.0 T-score difference from adjacent vertebrae. This is likely due to degenerative changes, which may artifactually increase BMD.  -RIGHT Hip Total: BMD: 0.646 g/cm2. T-score: -2.9. Z-score: -1.0.  -RIGHT Hip Femoral neck: BMD: 0.647 g/cm2. T-score: -2.8. Z-score: -0.8.  -LEFT Hip  Total: BMD: 0.633 g/cm2. T-score: -3.0. Z-score: -1.1.  -LEFT Hip Femoral neck: BMD: 0.693 g/cm2. T-score: -2.5. Z-score: -0.4.    TBS RESULTS  -Lumbar Spine L1-L2: TBS: 1.043. TBS T-score: -4.6.TBS Z-score: -1.6.    INTERVAL CHANGE  -There has been a 2.4% increase in lumbar spine BMD.  -There has been a 1.8% increase in bilateral hip BMD.                                                                   IMPRESSION: OSTEOPOROSIS with an interval increase in BMD. T-score meets the WHO criteria for osteoporosis at one or more measured sites. The risk of osteoporotic fracture increases approximately two-fold for each standard deviation decrease in T-score.     I reminded her about the importance of staying on her calcium and vitamin D supplements. She is no history of fractures.  She stays physically active.  Her gait is stable.     Gravelly voice  She has no pain in her throat or neck, and I do not palpate any abnormalities.  I told her 1 possibilities of vocal cord polyps, although she does not strain her vocal cords.  She has never had a smoking habit.  I told her that if she wants to pursue evaluation, that would be an otolaryngology consultation, which would include fiberoptic laryngoscopy.  She will let me know if she wants to proceed with that.      Earwax, I suggested to use an over-the-counter wax dissolving eardrop kit, example brand Debrox or Murine     Mild alopecia and spider veins      Insomnia for which he uses every day Ambien, but I really want her to cease alcohol consumption since she uses Ambien.  She told me that alcohol consumption is 1 glass of wine per day.      Recurrent herpes simplex labialis (cold sores) takes Valtrex 500 mg tablet intermittently.       She recall satisfactory screening mammogram done either March or April 2022 near [facility]     Colon polyp, 5 mm in the transverse seen on colonoscopy November 23, 2020.  Recheck in 5 years would be entirely optional.  She told  me that the prep was really unpleasant, to the point that it made her vomit.  I told her that I think she could probably stop doing screening colonoscopy, especially since she also had a negative Cologuard on November 6, 2020      Could consider getting a tetanus booster since it looks like the last one was 2011  Tetanus vaccine best to do at local pharmacy, since that is paid under Medicare part D the drug benefit     Immunization History   Administered Date(s) Administered    COVID-19 12+ (MODERNA) 11/04/2023, 09/23/2024    COVID-19 Bivalent 18+ (Moderna) 10/29/2022    COVID-19 Monovalent 18+ (Moderna) 02/12/2021, 03/12/2021, 10/25/2021, 07/19/2022    Flu 65+ (Fluad) 10/01/2016    Flu, Unspecified 09/24/2010, 09/21/2011, 10/01/2012, 10/29/2013, 09/21/2014, 09/13/2015, 09/06/2016, 10/01/2016, 09/29/2017    HepA, Unspecified 05/12/2011    Hepatitis A (VAQTA)(ADULT 19+) 05/12/2011    Influenza (H1N1) 01/06/2010    Influenza (High Dose) Trivalent,PF (Fluzone) 10/30/2013, 09/22/2014, 09/28/2020, 10/26/2022, 10/14/2024    Influenza (IIV3) PF 10/01/2012, 10/29/2013, 09/21/2014    Influenza (prior to 2024) 09/13/2015, 09/06/2016    Influenza Vaccine (Flucelvax Quadrivalent) 10/17/2019    Influenza Vaccine 65+ (FLUAD) 10/26/2022    Influenza Vaccine 65+ (Fluzone HD) 09/28/2020, 10/16/2021, 10/26/2023    Influenza Vaccine >6 months,quad, PF 11/02/2018    Influenza, Split Virus, Trivalent, Pf (Fluzone\Fluarix) 09/13/2015, 09/06/2016    Pneumo Conj 13-V (2010&after) 11/01/2016    Pneumococcal 23 valent 08/28/2020    TD,PF 7+ (Tenivac) 05/12/2011    Td (Adult), Adsorbed 04/12/2000    Td,adult,historic,unspecified 04/12/2000, 05/12/2011    Zoster recombinant adjuvanted (Shingrix) 08/21/2018, 05/13/2019    Zoster vaccine, live 05/18/2009

## 2025-06-04 NOTE — PROGRESS NOTES
Preventive Care Visit  [facility]  RAJ JUNG MD, Internal Medicine  Jun 4, 2025      Luis Marquez is a 80 year old, presenting for the following:  Physical        6/4/2025     4:16 PM   Additional Questions   Roomed by HALIE Christianson       HPI  Counseling  Appropriate preventive services were addressed with this patient via screening, questionnaire, or discussion as appropriate for fall prevention, nutrition, physical activity, Tobacco-use cessation, social engagement, weight loss and cognition.  Checklist reviewing preventive services available has been given to the patient.  Reviewed patient's diet, addressing concerns and/or questions.   If at risk for lack of exercise, the patient was provided with information to increase physical activity for the benefit of well-being.   If at risk for social isolation, the patient was provided with information about the benefit of social connection.   The patient was reminded to see the dentist every 6 months.   If at risk for psychosocial distress, the patient was provided with information to reduce risk.     Advance Care Planning    Discussed advance care planning with patient; however, patient declined at this time.        6/1/2025   General Health   How would you rate your overall physical health? Good   Feel stress (tense, anxious, or unable to sleep) Not at all         6/1/2025   Nutrition   Diet: Regular (no restrictions)         6/1/2025   Exercise   Days per week of moderate/strenous exercise 0 days   Average minutes spent exercising at this level 0 min   (!) EXERCISE CONCERN      6/1/2025   Social Factors   Frequency of gathering with friends or relatives Three times a week   Worry food won't last until get money to buy more No   Food not last or not have enough money for food? No   Do you have housing? (Housing is defined as stable permanent housing and does not include staying outside in a car, in a tent, in an abandoned building,  in an overnight shelter, or couch-surfing.) Yes   Are you worried about losing your housing? No   Lack of transportation? No   Unable to get utilities (heat,electricity)? No         6/4/2025   Fall Risk   Gait Speed Test Interpretation Less than or equal to 5.00 seconds - PASS         6/1/2025   Activities of Daily Living- Home Safety   Needs help with the following daily activites Housework   Safety concerns in the home None of the above         6/1/2025   Dental   Dentist two times every year? Yes         6/1/2025   Hearing Screening   Hearing concerns? (!) IT'S HARD TO FOLLOW A CONVERSATION IN A NOISY RESTAURANT OR CROWDED ROOM.    (!) TROUBLE UNDERSTANDING SOFT OR WHISPERED SPEECH.       Multiple values from one day are sorted in reverse-chronological order         6/1/2025   Driving Risk Screening   Patient/family members have concerns about driving No         6/1/2025   General Alertness/Fatigue Screening   Have you been more tired than usual lately? No         6/1/2025   Urinary Incontinence Screening   Bothered by leaking urine in past 6 months No         Today's PHQ-2 Score:       6/3/2025     5:06 PM   PHQ-2 ( 1999 Pf[employer])   Q1: Little interest or pleasure in doing things 0   Q2: Feeling down, depressed or hopeless 0   PHQ-2 Score 0    Q1: Little interest or pleasure in doing things Not at all   Q2: Feeling down, depressed or hopeless Not at all   PHQ-2 Score 0       Patient-reported           6/1/2025   Substance Use   Alcohol more than 3/day or more than 7/wk Yes   How often do you have a drink containing alcohol 4 or more times a week   How many alcohol drinks on typical day 1 or 2   How often do you have 5+ drinks at one occasion Never   Audit 2/3 Score 0   How often not able to stop drinking once started Never   How often failed to do what normally expected Never   How often needed first drink in am after a heavy drinking session Never   How often feeling of guilt or remorse after drinking Never   How  often unable to remember what happened the night before Never   Have you or someone else been injured because of your drinking No   Has anyone been concerned or suggested you cut down on drinking No   TOTAL SCORE - AUDIT 4   Do you have a current opioid prescription? No   How severe/bad is pain from 1 to 10? 3/10   Do you use any other substances recreationally? No     Social History     Tobacco Use    Smoking status: Never     Passive exposure: Never    Smokeless tobacco: Never   Vaping Use    Vaping status: Never Used   Substance Use Topics    Alcohol use: Yes     Alcohol/week: 14.0 standard drinks of alcohol     Types: 14 Standard drinks or equivalent per week     Comment: 14 drinks/week    Drug use: No       ASCVD Risk   The 10-year ASCVD risk score (Vazquez KAHN, et al., 2019) is: 29.2%    Values used to calculate the score:      Age: 80 years      Sex: Female      Is Non- : No      Diabetic: No      Tobacco smoker: No      Systolic Blood Pressure: 119 mmHg      Is BP treated: Yes      HDL Cholesterol: 82 mg/dL      Total Cholesterol: 197 mg/dL      Current providers sharing in care for this patient include:  Patient Care Team:  Andrea Cruz MD as PCP - General (Internal Medicine)  Andrea Cruz MD as Assigned PCP  Denilson Blunt MD as Fellow (Endocrinology, Diabetes, and Metabolism)  Felicia Bai MD as MD (Ophthalmology)  Felicia Bai MD as Assigned Surgical Provider    The following health maintenance items are reviewed in Epic and correct as of today:  Health Maintenance   Topic Date Due    DTAP/TDAP/TD VACCINE (1 - Tdap) 05/13/2011    RSV VACCINE (1 - 1-dose 75+ series) Never done    ANNUAL REVIEW OF HM ORDERS  09/21/2022    MEDICARE ANNUAL WELLNESS VISIT  12/14/2022    COVID-19 VACCINE (8 - 2024-25 season) 03/23/2025    BMP  08/14/2025    FALL RISK ASSESSMENT  06/04/2026    LIPID  09/21/2026    ADVANCE CARE PLANNING  12/14/2026    DIABETES  "SCREENING  08/14/2027    DEXA  06/10/2039    HEPATITIS C SCREENING  Completed    PHQ-2 (once per calendar year)  Completed    INFLUENZA VACCINE  Completed    PNEUMOCOCCAL VACCINE 50+ YEARS  Completed    ZOSTER VACCINE  Completed    HPV VACCINE  Aged Out    MENINGITIS VACCINE  Aged Out    MAMMO SCREENING  Discontinued    COLORECTAL CANCER SCREENING  Discontinued       Review of Systems  Constitutional, HEENT, cardiovascular, pulmonary, GI, , musculoskeletal, neuro, skin, endocrine and psych systems are negative, except as otherwise noted.     Objective    Exam  Pulse 69   Temp 98.1  F (36.7  C) (Oral)   Resp 18   Ht 1.607 m (5' 3.27\")   Wt 46.7 kg (103 lb)   LMP  (LMP Unknown)   SpO2 96%   BMI 18.09 kg/m     Estimated body mass index is 18.09 kg/m  as calculated from the following:    Height as of this encounter: 1.607 m (5' 3.27\").    Weight as of this encounter: 46.7 kg (103 lb).    Physical Exam    General: Alert, in no distress  Skin: No significant lesion seen.  Eyes/nose/throat: Eyes without scleral icterus, eye movements normal, pupils equal and reactive, oropharynx clear  + Small amount of wax both tympanic canals, for which I recommended that Sly use over-the-counter wax dissolving eardrops such as Debrox  MSK: Neck with good ROM  Lymphatic: Neck without adenopathy or masses  Endocrine: Thyroid with no nodules to palpation  Pulm: Lungs clear to auscultation bilaterally  Cardiac: Heart with regular rate and rhythm, no murmur or gallop  GI: Abdomen soft, nontender. No palpable enlargement of liver or spleen  MSK: Extremities no tenderness or edema  Neuro: Moves all extremities, without focal weakness  Psych: Alert, normal mental status. Normal affect and speech        6/4/2025   Mini Cog   Clock Draw Score 2 Normal   3 Item Recall 3 objects recalled   Mini Cog Total Score 5       ASSESSMENT PLAN       Annual preventive visit, overall Alma is doing well.  [place] [facility], and then spends summers here in [place].    She told me that she finds a Pilates class to be a enjoyable and good form of core muscle exercise, and I encouraged her to continue that.    Blood pressure looks good today.  We talked about the lightheadedness episodes and I asked her to focus on hydration.  For the time being we will continue the amlodipine.    Will have her stop by the laboratory this afternoon check comprehensive metabolic panel, blood cell counts, lipid profile, hemoglobin A1c, TSH for thyroid, vitamin D level.    Alma plans to pursue screening mammogram in [place].    Alma has been experiencing episodic drops in blood pressure to maybe 90 systolic or less, and I really wonder whether hydration status could be playing a role.  Alma told that she tries to drink multiple glasses of water a day, and I told her that that she might even allow herself some salt intake to help stabilize her volume status.  She should drink enough fluid that she should need to urinate every 2 hours or so during the day.    Essential hypertension, for which Alma resumed the small dose of amlodipine 2.5 mg a day November 2023     Alma is tried experiments of holding the amlodipine, but then her blood pressure might jump up to 150 or 170 systolic according to her home machine.    She did bring her home blood pressure machine to clinic December 1, 2023 and it compares fairly close to a manual reading      25-day cardiac event monitor showing no signs of rhythm disturbance  Cardiac event monitoring from 10/19/2023 to 11/17/2023 (monitored duration 25d 20h).      9-  Sinus rhythm   Low voltage QRS   Borderline ECG   No previous ECGs available   Confirmed by MARGOT MORRIS, JEANNA LOC:MILA ([id number]) on 9/26/2023 4:09:59 PM      Bilateral epiphora (excessive tears), takes doxycycline low-dose 50 mg once a day, monitored at [facility], Status post bilateral intraocular implants.      Nodule left lower  eyelid  Does not seem to bother Alma too much, but I would suggest using moisturizing lubricating eyedrops      Underweight, body mass index around 18  Wt Readings from Last 5 Encounters:   06/04/25 46.7 kg (103 lb)   09/05/24 46.3 kg (102 lb)   08/22/24 46.7 kg (103 lb)   08/14/24 47 kg (103 lb 9.6 oz)   06/27/24 47.2 kg (104 lb)     Small amount of wax both tympanic canals, for which I recommended that Sly use over-the-counter wax dissolving eardrops such as Debrox     Successful retina surgery December 6, 2022 for macular pucker  She will be departing for the [facility] after New Year's Day    Spinal canal stenosis  Right L5 transforaminal epidural steroid injection July 14, 2022  Lumbar spine MRI June 16, 2022  Moderate lumbar spondylosis with broad-based disc herniations evident throughout the lumbar spine.  Disc bulging and facet degenerative changes resulting in severe spinal canal stenosis and lateral recess narrowing at L4-5 and moderate to severe spinal canal stenosis at L3-4.     Takes gabapentin 600 mg at bedtime (2 capsules of 300 mg each)  I told Alma that she could try adding a morning dose of 300 mg in case she feels that she needs pain relief in the morning, but she may need to balance the pain relief effects against the brain fog side effect    Osteoarthritis base of the right thumb  A useful tool could be topical diclofenac (Voltaren gel, which is useful in hands, fingers, knees, and ankles, because the joint is relatively close to the skin    Right side piriformis syndrome.  After she had consultation with Dr. Sage Pickett at the , she was given a diagnosis of piriformis syndrome, and got an injection done at the  in January 2023 which was helpful  Did physical therapy twice a week--I am unsure of the impact after doing it for several months    Underwent sacroiliac joint injection on the right side October 14, 2022  Uses gabapentin to help relieve that pain  Advil  also helps, but I asked her to limit that to 800 mg in 24 hours  Ibuprofen I believe she should limit to 800 mg in 24 hours.  That would be for the 200 mg tablets.  I believe that 2 tablets twice a day would be okay, but less would be better     Underwent injection of platelet rich plasma into the right gluteal tendon, for diagnosis of gluteal tendinopathy  Done at [facility] on October 10, 2023    Osteoporosis for which she has been on alendronate for approximately 4 years, stopping in 2019, restarted June 2021  Switch to Reclast infusion (zoledronic acid) administered August 31, 2023, then September 2024 which plan to give that for 2 to 3 years, then drug holiday.  Under supervision of Dr. Gallardo (endocrinology)  Bone density improved when measured Fariba 10, 2024     [facility] endocrinology had recommended her to continue alendronate through 2020 to complete 5 years.  However she decided to stop it in 2019.  DEXA scan was May 2019 done at [facility] with a femoral neck T score of -2.5, lumbar spine T score -2.2.     EXAM: DX TBS AXIAL  LOCATION: [facility]  DATE: 6/10/2024  INDICATION: BMD screening. Follow-up. Bone mineralization medication use (Reclast).  DEMOGRAPHICS: Age- 79 years. Gender- Female. Menopausal status- Postmenopausal.  COMPARISON: 6/7/2023  TECHNIQUE: Dual-energy x-ray absorptiometry (DXA) performed with routine technique.   Trabecular bone score (TBS) analysis performed.     DXA RESULTS  -Lumbar Spine: L1-L2: BMD: 0.848 g/cm2. T-score: -2.6. Z-score: -0.8. L3-L4 omitted from lumbar spine due to greater than 1.0 T-score difference from adjacent vertebrae. This is likely due to degenerative changes, which may artifactually increase BMD.  -RIGHT Hip Total: BMD: 0.646 g/cm2. T-score: -2.9. Z-score: -1.0.  -RIGHT Hip Femoral neck: BMD: 0.647 g/cm2. T-score: -2.8. Z-score: -0.8.  -LEFT Hip Total: BMD: 0.633 g/cm2. T-score: -3.0. Z-score: -1.1.  -LEFT Hip Femoral neck:  BMD: 0.693 g/cm2. T-score: -2.5. Z-score: -0.4.    TBS RESULTS  -Lumbar Spine L1-L2: TBS: 1.043. TBS T-score: -4.6.TBS Z-score: -1.6.    INTERVAL CHANGE  -There has been a 2.4% increase in lumbar spine BMD.  -There has been a 1.8% increase in bilateral hip BMD.                                                                   IMPRESSION: OSTEOPOROSIS with an interval increase in BMD. T-score meets the WHO criteria for osteoporosis at one or more measured sites. The risk of osteoporotic fracture increases approximately two-fold for each standard deviation decrease in T-score.     I reminded her about the importance of staying on her calcium and vitamin D supplements. She is no history of fractures.  She stays physically active.  Her gait is stable.     Gravelly voice  She has no pain in her throat or neck, and I do not palpate any abnormalities.  I told her 1 possibilities of vocal cord polyps, although she does not strain her vocal cords.  She has never had a smoking habit.  I told her that if she wants to pursue evaluation, that would be an otolaryngology consultation, which would include fiberoptic laryngoscopy.  She will let me know if she wants to proceed with that.      Earwax, I suggested to use an over-the-counter wax dissolving eardrop kit, example brand Debrox or Murine     Mild alopecia and spider veins      Insomnia for which he uses every day Ambien, but I really want her to cease alcohol consumption since she uses Ambien.  She told me that alcohol consumption is 1 glass of wine per day.      Recurrent herpes simplex labialis (cold sores) takes Valtrex 500 mg tablet intermittently.       She recall satisfactory screening mammogram done either March or April 2022 near [facility]     Colon polyp, 5 mm in the transverse seen on colonoscopy November 23, 2020.  Recheck in 5 years would be entirely optional.  She told me that the prep was really unpleasant, to the point that it made her vomit.  I  told her that I think she could probably stop doing screening colonoscopy, especially since she also had a negative Cologuard on November 6, 2020      Could consider getting a tetanus booster since it looks like the last one was 2011  Tetanus vaccine best to do at local pharmacy, since that is paid under Medicare part D the drug benefit     Immunization History   Administered Date(s) Administered    COVID-19 12+ (MODERNA) 11/04/2023, 09/23/2024    COVID-19 Bivalent 18+ (Moderna) 10/29/2022    COVID-19 Monovalent 18+ (Moderna) 02/12/2021, 03/12/2021, 10/25/2021, 07/19/2022    Flu 65+ (Fluad) 10/01/2016    Flu, Unspecified 09/24/2010, 09/21/2011, 10/01/2012, 10/29/2013, 09/21/2014, 09/13/2015, 09/06/2016, 10/01/2016, 09/29/2017    HepA, Unspecified 05/12/2011    Hepatitis A (VAQTA)(ADULT 19+) 05/12/2011    Influenza (H1N1) 01/06/2010    Influenza (High Dose) Trivalent,PF (Fluzone) 10/30/2013, 09/22/2014, 09/28/2020, 10/26/2022, 10/14/2024    Influenza (IIV3) PF 10/01/2012, 10/29/2013, 09/21/2014    Influenza (prior to 2024) 09/13/2015, 09/06/2016    Influenza Vaccine (Flucelvax Quadrivalent) 10/17/2019    Influenza Vaccine 65+ (FLUAD) 10/26/2022    Influenza Vaccine 65+ (Fluzone HD) 09/28/2020, 10/16/2021, 10/26/2023    Influenza Vaccine >6 months,quad, PF 11/02/2018    Influenza, Split Virus, Trivalent, Pf (Fluzone\Fluarix) 09/13/2015, 09/06/2016    Pneumo Conj 13-V (2010&after) 11/01/2016    Pneumococcal 23 valent 08/28/2020    TD,PF 7+ (Tenivac) 05/12/2011    Td (Adult), Adsorbed 04/12/2000    Td,adult,historic,unspecified 04/12/2000, 05/12/2011    Zoster recombinant adjuvanted (Shingrix) 08/21/2018, 05/13/2019    Zoster vaccine, live 05/18/2009       Signed Electronically by: RAJ JUNG MD

## 2025-06-05 ENCOUNTER — RESULTS FOLLOW-UP (OUTPATIENT)
Dept: INTERNAL MEDICINE | Facility: CLINIC | Age: 80
End: 2025-06-05

## 2025-06-05 LAB
ALBUMIN SERPL BCG-MCNC: 4.4 G/DL (ref 3.5–5.2)
ALP SERPL-CCNC: 63 U/L (ref 40–150)
ALT SERPL W P-5'-P-CCNC: 16 U/L (ref 0–50)
ANION GAP SERPL CALCULATED.3IONS-SCNC: 11 MMOL/L (ref 7–15)
AST SERPL W P-5'-P-CCNC: 26 U/L (ref 0–45)
BILIRUB SERPL-MCNC: 0.5 MG/DL
BUN SERPL-MCNC: 15.1 MG/DL (ref 8–23)
CALCIUM SERPL-MCNC: 9 MG/DL (ref 8.8–10.4)
CHLORIDE SERPL-SCNC: 98 MMOL/L (ref 98–107)
CHOLEST SERPL-MCNC: 193 MG/DL
CREAT SERPL-MCNC: 0.84 MG/DL (ref 0.51–0.95)
EGFRCR SERPLBLD CKD-EPI 2021: 70 ML/MIN/1.73M2
FASTING STATUS PATIENT QL REPORTED: NO
FASTING STATUS PATIENT QL REPORTED: NO
GLUCOSE SERPL-MCNC: 84 MG/DL (ref 70–99)
HCO3 SERPL-SCNC: 24 MMOL/L (ref 22–29)
HDLC SERPL-MCNC: 89 MG/DL
LDLC SERPL CALC-MCNC: 94 MG/DL
NONHDLC SERPL-MCNC: 104 MG/DL
POTASSIUM SERPL-SCNC: 4.4 MMOL/L (ref 3.4–5.3)
PROT SERPL-MCNC: 6.8 G/DL (ref 6.4–8.3)
SODIUM SERPL-SCNC: 133 MMOL/L (ref 135–145)
TRIGL SERPL-MCNC: 48 MG/DL
TSH SERPL DL<=0.005 MIU/L-ACNC: 2.12 UIU/ML (ref 0.3–4.2)
VIT D+METAB SERPL-MCNC: 67 NG/ML (ref 20–50)

## 2025-06-12 ENCOUNTER — OFFICE VISIT (OUTPATIENT)
Dept: ENDOCRINOLOGY | Facility: CLINIC | Age: 80
End: 2025-06-12
Payer: MEDICARE

## 2025-06-12 VITALS
WEIGHT: 100 LBS | BODY MASS INDEX: 17.07 KG/M2 | HEIGHT: 64 IN | SYSTOLIC BLOOD PRESSURE: 158 MMHG | OXYGEN SATURATION: 98 % | DIASTOLIC BLOOD PRESSURE: 85 MMHG | HEART RATE: 59 BPM

## 2025-06-12 DIAGNOSIS — M81.0 OSTEOPOROSIS, UNSPECIFIED OSTEOPOROSIS TYPE, UNSPECIFIED PATHOLOGICAL FRACTURE PRESENCE: Primary | ICD-10-CM

## 2025-06-12 DIAGNOSIS — M81.0 AGE-RELATED OSTEOPOROSIS WITHOUT CURRENT PATHOLOGICAL FRACTURE: ICD-10-CM

## 2025-06-12 RX ORDER — ACETAMINOPHEN 325 MG/1
650 TABLET ORAL
OUTPATIENT
Start: 2025-09-08

## 2025-06-12 RX ORDER — EPINEPHRINE 1 MG/ML
0.3 INJECTION, SOLUTION, CONCENTRATE INTRAVENOUS EVERY 5 MIN PRN
OUTPATIENT
Start: 2025-09-08

## 2025-06-12 RX ORDER — DIPHENHYDRAMINE HYDROCHLORIDE 50 MG/ML
25 INJECTION, SOLUTION INTRAMUSCULAR; INTRAVENOUS
Start: 2025-09-08

## 2025-06-12 RX ORDER — METHYLPREDNISOLONE SODIUM SUCCINATE 40 MG/ML
40 INJECTION INTRAMUSCULAR; INTRAVENOUS
Start: 2025-09-08

## 2025-06-12 RX ORDER — HEPARIN SODIUM (PORCINE) LOCK FLUSH IV SOLN 100 UNIT/ML 100 UNIT/ML
5 SOLUTION INTRAVENOUS
OUTPATIENT
Start: 2025-09-08

## 2025-06-12 RX ORDER — ALBUTEROL SULFATE 90 UG/1
1-2 INHALANT RESPIRATORY (INHALATION)
Start: 2025-09-08

## 2025-06-12 RX ORDER — DIPHENHYDRAMINE HYDROCHLORIDE 50 MG/ML
50 INJECTION, SOLUTION INTRAMUSCULAR; INTRAVENOUS
Start: 2025-09-08

## 2025-06-12 RX ORDER — ZOLEDRONIC ACID 0.05 MG/ML
5 INJECTION, SOLUTION INTRAVENOUS ONCE
Start: 2025-09-08

## 2025-06-12 RX ORDER — HEPARIN SODIUM,PORCINE 10 UNIT/ML
5-20 VIAL (ML) INTRAVENOUS DAILY PRN
OUTPATIENT
Start: 2025-09-08

## 2025-06-12 RX ORDER — ALBUTEROL SULFATE 0.83 MG/ML
2.5 SOLUTION RESPIRATORY (INHALATION)
OUTPATIENT
Start: 2025-09-08

## 2025-06-12 ASSESSMENT — PAIN SCALES - GENERAL: PAINLEVEL_OUTOF10: NO PAIN (0)

## 2025-06-12 NOTE — ASSESSMENT & PLAN NOTE
She has had a good response to 1 dose of zoledronic acid based on DXA done in 2024. She has received another dose of zoledronic acid following this DXA.     We discussed on completing one last dose of zoledronic acid which will be due 9/2025.     She will need to have a repeat DXA in 2026 which I have ordered. If BMD is stable on that DXA, DXA can be repeated at 2 year intervals and if there is significant decrease in BMD in subsequent DXAs treatment with zoledronic acid could be restarted either in he primary care setting or she can be referred back again for treatment.     Vitamin D continues to be elevated. Recommended to stop Vitamin D supplements.

## 2025-06-12 NOTE — NURSING NOTE
"Chief Complaint   Patient presents with    Osteoporosis     Vital signs:      BP: (!) 154/94 Pulse: 59     SpO2: 98 %     Height: 162 cm (5' 3.78\") Weight: 45.4 kg (100 lb)  Estimated body mass index is 17.28 kg/m  as calculated from the following:    Height as of this encounter: 1.62 m (5' 3.78\").    Weight as of this encounter: 45.4 kg (100 lb).      Vital signs:      BP: (!) 158/85 Pulse: 59     SpO2: 98 %     Height: 162 cm (5' 3.78\") Weight: 45.4 kg (100 lb)  Estimated body mass index is 17.28 kg/m  as calculated from the following:    Height as of this encounter: 1.62 m (5' 3.78\").    Weight as of this encounter: 45.4 kg (100 lb).        "

## 2025-06-12 NOTE — LETTER
6/12/2025       RE: Alma Michele  [address] [place] [address]     Dear Colleague,    Thank you for referring your patient, Alma Michele, to the [facility] [place] at [facility]. Please see a copy of my visit note below.    05/20/25 3:01 PM  PATIENT LAB/IMAGING STATUS : Orders completed / No further action needed       Endocrinology and Diabetes      Alma Michele is a 79 year old yo female who is being referred for: Osteoporosis     Medical History Pertinent to this consultation includes: Osteoporosis    Interval History  She reports no interval falls, injuries or fractures.     About 2-3 times per year she will have episodes where her blood pressure drops and are associated with lightheadedness.     She was received now two doses of zoledronic acid which she has tolerated well.     History of Problem  She was initially diagnosed in 2009 based on DXA available [facility]     5/18/2009  Femur Neck: BMD =  0.712 g/cm (sq)   T-score = -2.4      Z-score = -0.7     Total Hip: BMD =  0.683 g/cm (sq)   T-score = -2.6      Z-score = -1.2     Right Hip [average of 2 scans]:   Femur Neck: BMD =  0.689 g/cm (sq)   T-score = -2.5      Z-score = -0.9   Total Hip: BMD =  0.695 g/cm (sq)   T-score = -2.5      Z-score = -1.1     Lumbar Spine [average of 2 scans]:   L1: BMD = 0.836 g/cm (sq), T-score =-2.5, Z-score =-0.6   L2: BMD = 0.906 g/cm (sq), T-score =-2.5, Z-score =-0.6   Total Lumbar Spine: BMD =  0.873 g/cm (sq)   T-score = -2.5      Z-score = -0.6      She does not recall having started medical therapy at this time, but was started on Alendronate around 2015 and sh continued to take until 2019. She stopped one year earlier than planned for her drug holiday. She restarted on 9/2021 after she completed 2 years of drug holiday. She has been taking Alendronate consistently and has not had any side effects. In 2023 she had a follow up  "DXA this year which showed 6 % loss of BMD which was a significant change for which she was referred for further evaluation.     Zoledronic Acid, Given 8/31/2023, 9/5/2024     She has not had any vertebral or hip fractures. There is no family history of osteoporotic fractures.      Calcium and Vitamin D. Not taking Calcium supplement currently. She consumes cheese, yogurt, leafy green vegetables. Vitamin D she takes 2,000 international unit(s) daily.     Prior evaluation for secondary causes in 2023 was negative for Celiac, Primary Hyperparathyroidism, Hyperthyroidism and Paraproteinemias.     Prior imaging studies    Dexa hip/pelvis/spine (06/10/2024 8:56 AM)   T Score of -3.0 at left hip total  INTERVAL CHANGE  -There has been a 2.4% increase in lumbar spine BMD.  -There has been a 1.8% increase in bilateral hip BMD.     No Known Allergies    Past Medical History:   Diagnosis Date     Essential hypertension      Osteoporosis        Past Surgical History:   Procedure Laterality Date     DACRYOCYSTORHINOSTOMY Right 8/22/2024    Procedure: Right dacryocystorhinostomy;  Surgeon: Felicia Bai MD;  [place] [facility] [place]     INJECT TRIGGER POINT Right 2/20/2023    Procedure: ultrasound-guided right piriformis muscle injection;  Surgeon: Gary Renee MD;  [place] [facility] [place]       Social History     Tobacco Use     Smoking status: Never     Passive exposure: Never     Smokeless tobacco: Never   Vaping Use     Vaping status: Never Used   Substance Use Topics     Alcohol use: Yes     Alcohol/week: 14.0 standard drinks of alcohol     Types: 14 Standard drinks or equivalent per week     Comment: 14 drinks/week     Drug use: No         Review of Systems   All other systems reviewed and are negative.       Objective    BP (!) 158/85   Pulse 59   Ht 1.62 m (5' 3.78\")   Wt 45.4 kg (100 lb)   LMP  (LMP Unknown)   SpO2 98%   BMI 17.28 kg/m       Wt Readings from Last 3 Encounters:   06/12/25 45.4 kg (100 lb)   06/04/25 " 46.7 kg (103 lb)   09/05/24 46.3 kg (102 lb)        Body mass index is 17.28 kg/m .    Physical Exam  Normal thyroid exam  No midline tenderness in lumbar spine  Normal proximal and distal muscle strength in lower extremities      Assessment.       ICD-10-CM    1. Osteoporosis, unspecified osteoporosis type, unspecified pathological fracture presence  M81.0 DX Bone Density      2. Age-related osteoporosis without current pathological fracture  M81.0              Plan    Osteoporosis  She has had a good response to 1 dose of zoledronic acid based on DXA done in 2024. She has received another dose of zoledronic acid following this DXA.     We discussed on completing one last dose of zoledronic acid which will be due 9/2025.     She will need to have a repeat DXA in 2026 which I have ordered. If BMD is stable on that DXA, DXA can be repeated at 2 year intervals and if there is significant decrease in BMD in subsequent DXAs treatment with zoledronic acid could be restarted either in he primary care setting or she can be referred back again for treatment.                Denilson Gallardo MD  Endocrinology Fellow      Attestation signed by Randa Rai MD at 6/12/2025 11:34 AM:  IRanda, I personally saw the patient, I evaluated and discussed the patient with the fellow and agree with the assessment and plan of care as documented in the fellow's note. I  reviewed vital signs, medications, labs and imaging and chart notes.    Key Findings: 79 y old woman with osteoporosis per DXA with risk factor of low body weight, menopause and aging. Complete 3 Reclast infusion, then follow up with PCP with DXA scan every 2 years including in 2026. If bone density should decline, pt can receive another Reclast infusion.    In re to infrequent spells of dizziness and risk to fall, does not appear to have Endocrine etiology. Note that pt is on Gabapentin, would assure that pt is not taking during the day as can be associated with  dizzy spells. Assure to avoid low salt intake, however needs to be careful with adding excessive salt considering comorbidity of HTN.    Randa Rai MD  Staff Physician  Division of Endocrinology  [facility]  Pager #[phone]  On Vocera          Again, thank you for allowing me to participate in the care of your patient.      Sincerely,    Denilson Simpson MD

## 2025-06-12 NOTE — PROGRESS NOTES
Endocrinology and Diabetes      Alma Michele is a 79 year old yo female who is being referred for: Osteoporosis     Medical History Pertinent to this consultation includes: Osteoporosis    Interval History  She reports no interval falls, injuries or fractures.     About 2-3 times per year she will have episodes where her blood pressure drops and are associated with lightheadedness.     She was received now two doses of zoledronic acid which she has tolerated well.     History of Problem  She was initially diagnosed in 2009 based on [facility] available [facility]     5/18/2009  Femur Neck: BMD =  0.712 g/cm (sq)   T-score = -2.4      Z-score = -0.7     Total Hip: BMD =  0.683 g/cm (sq)   T-score = -2.6      Z-score = -1.2     Right Hip [average of 2 scans]:   Femur Neck: BMD =  0.689 g/cm (sq)   T-score = -2.5      Z-score = -0.9   Total Hip: BMD =  0.695 g/cm (sq)   T-score = -2.5      Z-score = -1.1     Lumbar Spine [average of 2 scans]:   L1: BMD = 0.836 g/cm (sq), T-score =-2.5, Z-score =-0.6   L2: BMD = 0.906 g/cm (sq), T-score =-2.5, Z-score =-0.6   Total Lumbar Spine: BMD =  0.873 g/cm (sq)   T-score = -2.5      Z-score = -0.6      She does not recall having started medical therapy at this time, but was started on Alendronate around 2015 and sh continued to take until 2019. She stopped one year earlier than planned for her drug holiday. She restarted on 9/2021 after she completed 2 years of drug holiday. She has been taking Alendronate consistently and has not had any side effects. In 2023 she had a follow up DXA this year which showed 6 % loss of BMD which was a significant change for which she was referred for further evaluation.     Zoledronic Acid, Given 8/31/2023, 9/5/2024     She has not had any vertebral or hip fractures. There is no family history of osteoporotic fractures.      Calcium and Vitamin D. Not taking Calcium supplement currently. She consumes cheese, yogurt, leafy green vegetables.  "Vitamin D she takes 2,000 international unit(s) daily.     Prior evaluation for secondary causes in 2023 was negative for Celiac, Primary Hyperparathyroidism, Hyperthyroidism and Paraproteinemias.     Prior imaging studies    Dexa hip/pelvis/spine (06/10/2024 8:56 AM)   T Score of -3.0 at left hip total  INTERVAL CHANGE  -There has been a 2.4% increase in lumbar spine BMD.  -There has been a 1.8% increase in bilateral hip BMD.     No Known Allergies    Past Medical History:   Diagnosis Date    Essential hypertension     Osteoporosis        Past Surgical History:   Procedure Laterality Date    DACRYOCYSTORHINOSTOMY Right 8/22/2024    Procedure: Right dacryocystorhinostomy;  Surgeon: Felicia Bai MD;  [place] [facility] [place]    INJECT TRIGGER POINT Right 2/20/2023    Procedure: ultrasound-guided right piriformis muscle injection;  Surgeon: Gary Renee MD;  [place] [facility] [place]       Social History     Tobacco Use    Smoking status: Never     Passive exposure: Never    Smokeless tobacco: Never   Vaping Use    Vaping status: Never Used   Substance Use Topics    Alcohol use: Yes     Alcohol/week: 14.0 standard drinks of alcohol     Types: 14 Standard drinks or equivalent per week     Comment: 14 drinks/week    Drug use: No         Review of Systems   All other systems reviewed and are negative.       Objective    BP (!) 158/85   Pulse 59   Ht 1.62 m (5' 3.78\")   Wt 45.4 kg (100 lb)   LMP  (LMP Unknown)   SpO2 98%   BMI 17.28 kg/m       Wt Readings from Last 3 Encounters:   06/12/25 45.4 kg (100 lb)   06/04/25 46.7 kg (103 lb)   09/05/24 46.3 kg (102 lb)        Body mass index is 17.28 kg/m .    Physical Exam  Normal thyroid exam  No midline tenderness in lumbar spine  Normal proximal and distal muscle strength in lower extremities      Assessment.       ICD-10-CM    1. Osteoporosis, unspecified osteoporosis type, unspecified pathological fracture presence  M81.0 DX Bone Density      2. Age-related osteoporosis " without current pathological fracture  M81.0              Plan    Osteoporosis  She has had a good response to 1 dose of zoledronic acid based on DXA done in 2024. She has received another dose of zoledronic acid following this DXA.     We discussed on completing one last dose of zoledronic acid which will be due 9/2025.     She will need to have a repeat DXA in 2026 which I have ordered. If BMD is stable on that DXA, DXA can be repeated at 2 year intervals and if there is significant decrease in BMD in subsequent DXAs treatment with zoledronic acid could be restarted either in he primary care setting or she can be referred back again for treatment.                Denilson Gallardo MD  Endocrinology Fellow

## 2025-07-28 DIAGNOSIS — I10 ESSENTIAL HYPERTENSION: ICD-10-CM

## 2025-07-28 RX ORDER — AMLODIPINE BESYLATE 2.5 MG/1
2.5 TABLET ORAL 2 TIMES DAILY
Qty: 180 TABLET | Refills: 2 | Status: SHIPPED | OUTPATIENT
Start: 2025-07-28

## (undated) DEVICE — TRAY PAIN INJECTION 97A 640

## (undated) DEVICE — GLOVE BIOGEL PI MICRO SZ 7.5 48575

## (undated) DEVICE — GLOVE ESTEEM POWDER FREE 7.0  2D72PL70

## (undated) DEVICE — BLADE KNIFE SURG 15 371115

## (undated) DEVICE — NDL 25GA 2"  8881200441

## (undated) DEVICE — COVER ULTRASOUND PROBE W/GEL FLEXI-FEEL 6"X58" LF  25-FF658

## (undated) DEVICE — NEEDLE SPINAL DISP 22GA X 5" QUINCKE 3333355

## (undated) DEVICE — TIP NAVIGATOR SPETZLER MICRO CLAW UNIV 25KHZ 5450-800-315

## (undated) DEVICE — EYE PREP BETADINE 5% SOLUTION 30ML 0065-0411-30

## (undated) DEVICE — LINEN TOWEL PACK X5 5464

## (undated) DEVICE — SOL WATER IRRIG 500ML BOTTLE 2F7113

## (undated) DEVICE — PACK MINOR EYE CUSTOM ASC

## (undated) DEVICE — SUCTION MANIFOLD NEPTUNE 2 SYS 1 PORT 702-025-000

## (undated) DEVICE — PREP CHLORAPREP W/ORANGE TINT 10.5ML 260715

## (undated) DEVICE — ESU GROUND PAD ADULT W/CORD E7507

## (undated) DEVICE — ESU PENCIL SMOKE EVAC W/ROCKER SWITCH 0703-047-000

## (undated) DEVICE — SPONGE COTTONOID 2X1/2" 80-1406

## (undated) DEVICE — ESU NDL COLORADO MICRO 3CM STR N103A

## (undated) DEVICE — PEN MARKING SKIN TYCO DEVON DUAL TIP 31145868

## (undated) DEVICE — TUBING SUCTION MEDI-VAC 1/4"X20' N620A

## (undated) DEVICE — EYE KNIFE CRESCENT 55DEG 373807

## (undated) RX ORDER — FENTANYL CITRATE 50 UG/ML
INJECTION, SOLUTION INTRAMUSCULAR; INTRAVENOUS
Status: DISPENSED
Start: 2024-08-22

## (undated) RX ORDER — LIDOCAINE HYDROCHLORIDE 10 MG/ML
INJECTION, SOLUTION EPIDURAL; INFILTRATION; INTRACAUDAL; PERINEURAL
Status: DISPENSED
Start: 2023-06-05

## (undated) RX ORDER — EPHEDRINE SULFATE 50 MG/ML
INJECTION, SOLUTION INTRAMUSCULAR; INTRAVENOUS; SUBCUTANEOUS
Status: DISPENSED
Start: 2024-08-22

## (undated) RX ORDER — CEFAZOLIN SODIUM 2 G/50ML
SOLUTION INTRAVENOUS
Status: DISPENSED
Start: 2024-08-22

## (undated) RX ORDER — OXYMETAZOLINE HYDROCHLORIDE 0.05 G/100ML
SPRAY NASAL
Status: DISPENSED
Start: 2024-08-22

## (undated) RX ORDER — ACETAMINOPHEN 325 MG/1
TABLET ORAL
Status: DISPENSED
Start: 2024-08-22

## (undated) RX ORDER — TRANEXAMIC ACID 100 MG/ML
INJECTION, SOLUTION INTRAVENOUS
Status: DISPENSED
Start: 2024-08-22

## (undated) RX ORDER — OXYCODONE HYDROCHLORIDE 5 MG/1
TABLET ORAL
Status: DISPENSED
Start: 2024-08-22

## (undated) RX ORDER — LIDOCAINE HYDROCHLORIDE 10 MG/ML
INJECTION, SOLUTION EPIDURAL; INFILTRATION; INTRACAUDAL; PERINEURAL
Status: DISPENSED
Start: 2023-08-30